# Patient Record
Sex: FEMALE | Race: BLACK OR AFRICAN AMERICAN | NOT HISPANIC OR LATINO | URBAN - METROPOLITAN AREA
[De-identification: names, ages, dates, MRNs, and addresses within clinical notes are randomized per-mention and may not be internally consistent; named-entity substitution may affect disease eponyms.]

---

## 2017-02-27 ENCOUNTER — EMERGENCY (EMERGENCY)
Facility: HOSPITAL | Age: 72
LOS: 0 days | Discharge: HOME | End: 2017-02-27
Attending: EMERGENCY MEDICINE
Payer: COMMERCIAL

## 2017-02-27 VITALS
HEIGHT: 71 IN | DIASTOLIC BLOOD PRESSURE: 80 MMHG | TEMPERATURE: 98 F | OXYGEN SATURATION: 98 % | HEART RATE: 103 BPM | SYSTOLIC BLOOD PRESSURE: 141 MMHG | WEIGHT: 207.01 LBS

## 2017-02-27 VITALS
RESPIRATION RATE: 16 BRPM | SYSTOLIC BLOOD PRESSURE: 148 MMHG | DIASTOLIC BLOOD PRESSURE: 84 MMHG | OXYGEN SATURATION: 98 % | TEMPERATURE: 98 F | HEART RATE: 104 BPM

## 2017-02-27 DIAGNOSIS — M19.90 UNSPECIFIED OSTEOARTHRITIS, UNSPECIFIED SITE: ICD-10-CM

## 2017-02-27 DIAGNOSIS — I10 ESSENTIAL (PRIMARY) HYPERTENSION: ICD-10-CM

## 2017-02-27 DIAGNOSIS — L03.116 CELLULITIS OF LEFT LOWER LIMB: ICD-10-CM

## 2017-02-27 DIAGNOSIS — Z79.4 LONG TERM (CURRENT) USE OF INSULIN: ICD-10-CM

## 2017-02-27 DIAGNOSIS — E78.5 HYPERLIPIDEMIA, UNSPECIFIED: ICD-10-CM

## 2017-02-27 DIAGNOSIS — M54.9 DORSALGIA, UNSPECIFIED: ICD-10-CM

## 2017-02-27 DIAGNOSIS — E11.9 TYPE 2 DIABETES MELLITUS WITHOUT COMPLICATIONS: ICD-10-CM

## 2017-02-27 LAB
ALBUMIN SERPL ELPH-MCNC: 3.7 G/DL — SIGNIFICANT CHANGE UP (ref 3.3–5)
ALP SERPL-CCNC: 213 U/L — HIGH (ref 40–120)
ALT FLD-CCNC: 22 U/L — SIGNIFICANT CHANGE UP (ref 12–78)
ANION GAP SERPL CALC-SCNC: 7 MMOL/L — SIGNIFICANT CHANGE UP (ref 5–17)
AST SERPL-CCNC: 14 U/L — LOW (ref 15–37)
BASOPHILS # BLD AUTO: 0.1 K/UL — SIGNIFICANT CHANGE UP (ref 0–0.2)
BASOPHILS NFR BLD AUTO: 1.4 % — SIGNIFICANT CHANGE UP (ref 0–2)
BILIRUB SERPL-MCNC: 0.6 MG/DL — SIGNIFICANT CHANGE UP (ref 0.2–1.2)
BUN SERPL-MCNC: 7 MG/DL — SIGNIFICANT CHANGE UP (ref 7–23)
CALCIUM SERPL-MCNC: 9.5 MG/DL — SIGNIFICANT CHANGE UP (ref 8.5–10.1)
CHLORIDE SERPL-SCNC: 107 MMOL/L — SIGNIFICANT CHANGE UP (ref 96–108)
CO2 SERPL-SCNC: 27 MMOL/L — SIGNIFICANT CHANGE UP (ref 22–31)
CREAT SERPL-MCNC: 0.68 MG/DL — SIGNIFICANT CHANGE UP (ref 0.5–1.3)
CRP SERPL-MCNC: 1.65 MG/DL — HIGH (ref 0–0.4)
EOSINOPHIL # BLD AUTO: 0 K/UL — SIGNIFICANT CHANGE UP (ref 0–0.5)
EOSINOPHIL NFR BLD AUTO: 0.9 % — SIGNIFICANT CHANGE UP (ref 0–6)
ERYTHROCYTE [SEDIMENTATION RATE] IN BLOOD: 2 MM/HR — SIGNIFICANT CHANGE UP (ref 0–20)
GLUCOSE SERPL-MCNC: 100 MG/DL — HIGH (ref 70–99)
HCT VFR BLD CALC: 44.4 % — SIGNIFICANT CHANGE UP (ref 34.5–45)
HGB BLD-MCNC: 14.3 G/DL — SIGNIFICANT CHANGE UP (ref 11.5–15.5)
LYMPHOCYTES # BLD AUTO: 2.1 K/UL — SIGNIFICANT CHANGE UP (ref 1–3.3)
LYMPHOCYTES # BLD AUTO: 39.1 % — SIGNIFICANT CHANGE UP (ref 13–44)
MCHC RBC-ENTMCNC: 25.1 PG — LOW (ref 27–34)
MCHC RBC-ENTMCNC: 32.2 GM/DL — SIGNIFICANT CHANGE UP (ref 32–36)
MCV RBC AUTO: 77.9 FL — LOW (ref 80–100)
MONOCYTES # BLD AUTO: 0.5 K/UL — SIGNIFICANT CHANGE UP (ref 0–0.9)
MONOCYTES NFR BLD AUTO: 9.8 % — SIGNIFICANT CHANGE UP (ref 2–14)
NEUTROPHILS # BLD AUTO: 2.7 K/UL — SIGNIFICANT CHANGE UP (ref 1.8–7.4)
NEUTROPHILS NFR BLD AUTO: 48.9 % — SIGNIFICANT CHANGE UP (ref 43–77)
PLATELET # BLD AUTO: 195 K/UL — SIGNIFICANT CHANGE UP (ref 150–400)
POTASSIUM SERPL-MCNC: 3.4 MMOL/L — LOW (ref 3.5–5.3)
POTASSIUM SERPL-SCNC: 3.4 MMOL/L — LOW (ref 3.5–5.3)
PROT SERPL-MCNC: 7.8 GM/DL — SIGNIFICANT CHANGE UP (ref 6–8.3)
RBC # BLD: 5.69 M/UL — HIGH (ref 3.8–5.2)
RBC # FLD: 13.6 % — SIGNIFICANT CHANGE UP (ref 11–15)
SODIUM SERPL-SCNC: 141 MMOL/L — SIGNIFICANT CHANGE UP (ref 135–145)
WBC # BLD: 5.5 K/UL — SIGNIFICANT CHANGE UP (ref 3.8–10.5)
WBC # FLD AUTO: 5.5 K/UL — SIGNIFICANT CHANGE UP (ref 3.8–10.5)

## 2017-02-27 PROCEDURE — 73552 X-RAY EXAM OF FEMUR 2/>: CPT | Mod: 26,LT

## 2017-02-27 PROCEDURE — 99284 EMERGENCY DEPT VISIT MOD MDM: CPT

## 2017-02-27 RX ORDER — IBUPROFEN 200 MG
600 TABLET ORAL ONCE
Qty: 0 | Refills: 0 | Status: COMPLETED | OUTPATIENT
Start: 2017-02-27 | End: 2017-02-27

## 2017-02-27 RX ORDER — VANCOMYCIN HCL 1 G
1000 VIAL (EA) INTRAVENOUS ONCE
Qty: 0 | Refills: 0 | Status: COMPLETED | OUTPATIENT
Start: 2017-02-27 | End: 2017-02-27

## 2017-02-27 RX ORDER — AMPICILLIN SODIUM AND SULBACTAM SODIUM 250; 125 MG/ML; MG/ML
3 INJECTION, POWDER, FOR SUSPENSION INTRAMUSCULAR; INTRAVENOUS ONCE
Qty: 0 | Refills: 0 | Status: COMPLETED | OUTPATIENT
Start: 2017-02-27 | End: 2017-02-27

## 2017-02-27 RX ADMIN — Medication 250 MILLIGRAM(S): at 13:11

## 2017-02-27 RX ADMIN — AMPICILLIN SODIUM AND SULBACTAM SODIUM 200 GRAM(S): 250; 125 INJECTION, POWDER, FOR SUSPENSION INTRAMUSCULAR; INTRAVENOUS at 13:11

## 2017-02-27 RX ADMIN — Medication 600 MILLIGRAM(S): at 15:54

## 2017-02-27 RX ADMIN — Medication 600 MILLIGRAM(S): at 15:55

## 2017-02-27 NOTE — ED ADULT NURSE NOTE - CHIEF COMPLAINT QUOTE
pt complaining of abscess to left inner thigh since Friday. pt has history of htn and diabetes. pt from [facility] adult day care.

## 2017-02-27 NOTE — ED ADULT NURSE NOTE - ED STAT RN HANDOFF DETAILS
patient feels better  hr remains above 103 md [place] informed no further instructions   patient is from day care of [facility] to go back through there service

## 2017-02-27 NOTE — ED PROVIDER NOTE - PHYSICAL EXAMINATION
+ left medial thigh abscess + left medial thigh mid thigh to post knee and proximal medial leg ~ 12cm x 6 cm erythema/cellulitis

## 2017-02-27 NOTE — ED PROVIDER NOTE - MEDICAL DECISION MAKING DETAILS
Pt well appearing, labs wnl. DC with bactrim. aware if inc erythema, fever to return to er. Discussed results and outcome of testing with the patient.  Patient given copy of available results. Patient advised to please follow up with their PMD within the next 24 hours and return to the Emergency Department for worsening symptoms or any other concerns.

## 2017-02-27 NOTE — ED ADULT TRIAGE NOTE - CHIEF COMPLAINT QUOTE
pt complaining of abscess to left inner thigh since Friday. pt has history of htn and diabetes. pt from [facility] adult day care.

## 2017-02-27 NOTE — ED PROVIDER NOTE - OBJECTIVE STATEMENT
70yo female with pmh HTN, DM presents with 3 days of left medial thigh abscess. No fever.  Pt works at [employer].     No fever/chills. No photophobia/eye pain/changes in vision, No ear pain/sore throat/dysphagia, No chest pain/palpitations. No SOB/cough/stridor. No abdominal pain, N/V/D, no black/bloody bm. No dysuria/frequency/discharge, No headache. No Dizziness.  + rash.  No numbness/tingling/weakness. 72yo female with pmh HTN, DM presents with 3 days of left medial thigh erythema. No fever.  Pt works at [employer].     No fever/chills. No photophobia/eye pain/changes in vision, No ear pain/sore throat/dysphagia, No chest pain/palpitations. No SOB/cough/stridor. No abdominal pain, N/V/D, no black/bloody bm. No dysuria/frequency/discharge, No headache. No Dizziness.  + rash.  No numbness/tingling/weakness. 72yo female with pmh HTN, DM presents with 3 days of left medial thigh erythema. No fever.  Pt from [facility] adult day care.    No fever/chills. No photophobia/eye pain/changes in vision, No ear pain/sore throat/dysphagia, No chest pain/palpitations. No SOB/cough/stridor. No abdominal pain, N/V/D, no black/bloody bm. No dysuria/frequency/discharge, No headache. No Dizziness.  + rash.  No numbness/tingling/weakness.

## 2017-03-01 ENCOUNTER — INPATIENT (INPATIENT)
Facility: HOSPITAL | Age: 72
LOS: 4 days | Discharge: HOME | End: 2017-03-06
Attending: INTERNAL MEDICINE | Admitting: INTERNAL MEDICINE
Payer: COMMERCIAL

## 2017-03-01 VITALS
OXYGEN SATURATION: 100 % | WEIGHT: 207.01 LBS | TEMPERATURE: 98 F | SYSTOLIC BLOOD PRESSURE: 175 MMHG | HEIGHT: 71 IN | DIASTOLIC BLOOD PRESSURE: 73 MMHG | RESPIRATION RATE: 18 BRPM | HEART RATE: 84 BPM

## 2017-03-01 DIAGNOSIS — M19.90 UNSPECIFIED OSTEOARTHRITIS, UNSPECIFIED SITE: ICD-10-CM

## 2017-03-01 DIAGNOSIS — R52 PAIN, UNSPECIFIED: ICD-10-CM

## 2017-03-01 DIAGNOSIS — E78.5 HYPERLIPIDEMIA, UNSPECIFIED: ICD-10-CM

## 2017-03-01 DIAGNOSIS — E11.9 TYPE 2 DIABETES MELLITUS WITHOUT COMPLICATIONS: ICD-10-CM

## 2017-03-01 DIAGNOSIS — I10 ESSENTIAL (PRIMARY) HYPERTENSION: ICD-10-CM

## 2017-03-01 DIAGNOSIS — L03.116 CELLULITIS OF LEFT LOWER LIMB: ICD-10-CM

## 2017-03-01 LAB
ALBUMIN SERPL ELPH-MCNC: 3.8 G/DL — SIGNIFICANT CHANGE UP (ref 3.3–5)
ALP SERPL-CCNC: 207 U/L — HIGH (ref 40–120)
ALT FLD-CCNC: 24 U/L — SIGNIFICANT CHANGE UP (ref 12–78)
ANION GAP SERPL CALC-SCNC: 9 MMOL/L — SIGNIFICANT CHANGE UP (ref 5–17)
APPEARANCE UR: CLEAR — SIGNIFICANT CHANGE UP
APTT BLD: 33 SEC — SIGNIFICANT CHANGE UP (ref 27.5–37.4)
AST SERPL-CCNC: 16 U/L — SIGNIFICANT CHANGE UP (ref 15–37)
BACTERIA # UR AUTO: ABNORMAL
BASOPHILS # BLD AUTO: 0 K/UL — SIGNIFICANT CHANGE UP (ref 0–0.2)
BASOPHILS NFR BLD AUTO: 1.1 % — SIGNIFICANT CHANGE UP (ref 0–2)
BILIRUB SERPL-MCNC: 0.3 MG/DL — SIGNIFICANT CHANGE UP (ref 0.2–1.2)
BILIRUB UR-MCNC: NEGATIVE — SIGNIFICANT CHANGE UP
BUN SERPL-MCNC: 9 MG/DL — SIGNIFICANT CHANGE UP (ref 7–23)
CALCIUM SERPL-MCNC: 9.6 MG/DL — SIGNIFICANT CHANGE UP (ref 8.5–10.1)
CHLORIDE SERPL-SCNC: 106 MMOL/L — SIGNIFICANT CHANGE UP (ref 96–108)
CO2 SERPL-SCNC: 26 MMOL/L — SIGNIFICANT CHANGE UP (ref 22–31)
COLOR SPEC: YELLOW — SIGNIFICANT CHANGE UP
CREAT SERPL-MCNC: 0.8 MG/DL — SIGNIFICANT CHANGE UP (ref 0.5–1.3)
DIFF PNL FLD: ABNORMAL
EOSINOPHIL # BLD AUTO: 0.1 K/UL — SIGNIFICANT CHANGE UP (ref 0–0.5)
EOSINOPHIL NFR BLD AUTO: 1.3 % — SIGNIFICANT CHANGE UP (ref 0–6)
EPI CELLS # UR: SIGNIFICANT CHANGE UP
GLUCOSE SERPL-MCNC: 95 MG/DL — SIGNIFICANT CHANGE UP (ref 70–99)
GLUCOSE UR QL: NEGATIVE MG/DL — SIGNIFICANT CHANGE UP
HCT VFR BLD CALC: 44.3 % — SIGNIFICANT CHANGE UP (ref 34.5–45)
HGB BLD-MCNC: 14.3 G/DL — SIGNIFICANT CHANGE UP (ref 11.5–15.5)
INR BLD: 1.04 RATIO — SIGNIFICANT CHANGE UP (ref 0.88–1.16)
KETONES UR-MCNC: NEGATIVE — SIGNIFICANT CHANGE UP
LEUKOCYTE ESTERASE UR-ACNC: NEGATIVE — SIGNIFICANT CHANGE UP
LYMPHOCYTES # BLD AUTO: 2.2 K/UL — SIGNIFICANT CHANGE UP (ref 1–3.3)
LYMPHOCYTES # BLD AUTO: 50.9 % — HIGH (ref 13–44)
MCHC RBC-ENTMCNC: 25 PG — LOW (ref 27–34)
MCHC RBC-ENTMCNC: 32.4 GM/DL — SIGNIFICANT CHANGE UP (ref 32–36)
MCV RBC AUTO: 77.2 FL — LOW (ref 80–100)
MONOCYTES # BLD AUTO: 0.4 K/UL — SIGNIFICANT CHANGE UP (ref 0–0.9)
MONOCYTES NFR BLD AUTO: 9.9 % — SIGNIFICANT CHANGE UP (ref 2–14)
NEUTROPHILS # BLD AUTO: 1.6 K/UL — LOW (ref 1.8–7.4)
NEUTROPHILS NFR BLD AUTO: 36.8 % — LOW (ref 43–77)
NITRITE UR-MCNC: NEGATIVE — SIGNIFICANT CHANGE UP
PH UR: 6 — SIGNIFICANT CHANGE UP (ref 4.8–8)
PLATELET # BLD AUTO: 216 K/UL — SIGNIFICANT CHANGE UP (ref 150–400)
POTASSIUM SERPL-MCNC: 3.6 MMOL/L — SIGNIFICANT CHANGE UP (ref 3.5–5.3)
POTASSIUM SERPL-SCNC: 3.6 MMOL/L — SIGNIFICANT CHANGE UP (ref 3.5–5.3)
PROT SERPL-MCNC: 7.9 GM/DL — SIGNIFICANT CHANGE UP (ref 6–8.3)
PROT UR-MCNC: NEGATIVE MG/DL — SIGNIFICANT CHANGE UP
PROTHROM AB SERPL-ACNC: 11.6 SEC — SIGNIFICANT CHANGE UP (ref 10–13.1)
RBC # BLD: 5.74 M/UL — HIGH (ref 3.8–5.2)
RBC # FLD: 13.6 % — SIGNIFICANT CHANGE UP (ref 11–15)
RBC CASTS # UR COMP ASSIST: SIGNIFICANT CHANGE UP /HPF (ref 0–4)
SODIUM SERPL-SCNC: 141 MMOL/L — SIGNIFICANT CHANGE UP (ref 135–145)
SP GR SPEC: 1.03 — HIGH (ref 1.01–1.02)
UROBILINOGEN FLD QL: NEGATIVE MG/DL — SIGNIFICANT CHANGE UP
WBC # BLD: 4.2 K/UL — SIGNIFICANT CHANGE UP (ref 3.8–10.5)
WBC # FLD AUTO: 4.2 K/UL — SIGNIFICANT CHANGE UP (ref 3.8–10.5)
WBC UR QL: NEGATIVE — SIGNIFICANT CHANGE UP

## 2017-03-01 PROCEDURE — 99223 1ST HOSP IP/OBS HIGH 75: CPT

## 2017-03-01 PROCEDURE — 73552 X-RAY EXAM OF FEMUR 2/>: CPT | Mod: 26,LT

## 2017-03-01 PROCEDURE — 99285 EMERGENCY DEPT VISIT HI MDM: CPT

## 2017-03-01 RX ORDER — PIPERACILLIN AND TAZOBACTAM 4; .5 G/20ML; G/20ML
3.38 INJECTION, POWDER, LYOPHILIZED, FOR SOLUTION INTRAVENOUS EVERY 8 HOURS
Qty: 0 | Refills: 0 | Status: DISCONTINUED | OUTPATIENT
Start: 2017-03-01 | End: 2017-03-06

## 2017-03-01 RX ORDER — AMLODIPINE BESYLATE 2.5 MG/1
5 TABLET ORAL DAILY
Qty: 0 | Refills: 0 | Status: DISCONTINUED | OUTPATIENT
Start: 2017-03-01 | End: 2017-03-06

## 2017-03-01 RX ORDER — ATORVASTATIN CALCIUM 80 MG/1
20 TABLET, FILM COATED ORAL AT BEDTIME
Qty: 0 | Refills: 0 | Status: DISCONTINUED | OUTPATIENT
Start: 2017-03-01 | End: 2017-03-06

## 2017-03-01 RX ORDER — DEXTROSE 50 % IN WATER 50 %
25 SYRINGE (ML) INTRAVENOUS ONCE
Qty: 0 | Refills: 0 | Status: DISCONTINUED | OUTPATIENT
Start: 2017-03-01 | End: 2017-03-06

## 2017-03-01 RX ORDER — INSULIN LISPRO 100/ML
VIAL (ML) SUBCUTANEOUS
Qty: 0 | Refills: 0 | Status: DISCONTINUED | OUTPATIENT
Start: 2017-03-01 | End: 2017-03-06

## 2017-03-01 RX ORDER — DEXTROSE 50 % IN WATER 50 %
12.5 SYRINGE (ML) INTRAVENOUS ONCE
Qty: 0 | Refills: 0 | Status: DISCONTINUED | OUTPATIENT
Start: 2017-03-01 | End: 2017-03-06

## 2017-03-01 RX ORDER — HEPARIN SODIUM 5000 [USP'U]/ML
5000 INJECTION INTRAVENOUS; SUBCUTANEOUS EVERY 12 HOURS
Qty: 0 | Refills: 0 | Status: DISCONTINUED | OUTPATIENT
Start: 2017-03-01 | End: 2017-03-06

## 2017-03-01 RX ORDER — PIPERACILLIN AND TAZOBACTAM 4; .5 G/20ML; G/20ML
3.38 INJECTION, POWDER, LYOPHILIZED, FOR SOLUTION INTRAVENOUS ONCE
Qty: 0 | Refills: 0 | Status: COMPLETED | OUTPATIENT
Start: 2017-03-01 | End: 2017-03-01

## 2017-03-01 RX ORDER — INSULIN LISPRO 100/ML
VIAL (ML) SUBCUTANEOUS AT BEDTIME
Qty: 0 | Refills: 0 | Status: DISCONTINUED | OUTPATIENT
Start: 2017-03-01 | End: 2017-03-06

## 2017-03-01 RX ORDER — GLUCAGON INJECTION, SOLUTION 0.5 MG/.1ML
1 INJECTION, SOLUTION SUBCUTANEOUS ONCE
Qty: 0 | Refills: 0 | Status: DISCONTINUED | OUTPATIENT
Start: 2017-03-01 | End: 2017-03-06

## 2017-03-01 RX ORDER — DEXTROSE 50 % IN WATER 50 %
1 SYRINGE (ML) INTRAVENOUS ONCE
Qty: 0 | Refills: 0 | Status: DISCONTINUED | OUTPATIENT
Start: 2017-03-01 | End: 2017-03-06

## 2017-03-01 RX ORDER — VANCOMYCIN HCL 1 G
1000 VIAL (EA) INTRAVENOUS ONCE
Qty: 0 | Refills: 0 | Status: COMPLETED | OUTPATIENT
Start: 2017-03-01 | End: 2017-03-01

## 2017-03-01 RX ORDER — DOCUSATE SODIUM 100 MG
100 CAPSULE ORAL
Qty: 0 | Refills: 0 | Status: DISCONTINUED | OUTPATIENT
Start: 2017-03-01 | End: 2017-03-06

## 2017-03-01 RX ADMIN — ATORVASTATIN CALCIUM 20 MILLIGRAM(S): 80 TABLET, FILM COATED ORAL at 21:34

## 2017-03-01 RX ADMIN — Medication 250 MILLIGRAM(S): at 18:31

## 2017-03-01 RX ADMIN — PIPERACILLIN AND TAZOBACTAM 200 GRAM(S): 4; .5 INJECTION, POWDER, LYOPHILIZED, FOR SOLUTION INTRAVENOUS at 18:05

## 2017-03-01 RX ADMIN — PIPERACILLIN AND TAZOBACTAM 25 GRAM(S): 4; .5 INJECTION, POWDER, LYOPHILIZED, FOR SOLUTION INTRAVENOUS at 21:34

## 2017-03-01 NOTE — H&P ADULT. - ASSESSMENT
71 year old female, admitted for failed oral antibiotics treatment for left inner thigh cellulitis. Patient is being treated with IV antibiotic and referred for ID Consult.

## 2017-03-01 NOTE — H&P ADULT. - HISTORY OF PRESENT ILLNESS
71 year old female PMH of HTN, DM Type 2 presents to ED with worsening erythema and pain of left inner thigh, extending downward slightly pass knee. Patient is currently on PO Bactrim which started on Monday (2/27/17) when she visited our ED. Patient reported that condition first started last Thursday (2/23/17). Erythema is accompanied by pain 4/10 and occasional itching. No lower extremity swelling present. Denies fever, chills, calf pain. 71 year old female PMH of HTN, DM Type 2 presents to ED with worsening erythema and pain of left inner thigh, extending downward slightly pass knee. Patient is currently on PO Bactrim which started on Monday (2/27/17) when she visited our ED. Patient reported that condition first started last Thursday (2/23/17). Erythema is accompanied by pain 4/10 and occasional itching. No lower extremity swelling present. Moving all extremities. Pedal Pulse +2.  Denies fever, chills, or calf pain. 71 year old female PMH of HTN, DM Type 2 presents to ED with worsening erythema and pain of left inner thigh, extending downward slightly pass knee. Site warm to touch.  Patient is currently on PO Bactrim which started on Monday (2/27/17) when she visited our ED. Patient reported that condition first started last Thursday (2/23/17). Erythema is accompanied by pain 4/10 and occasional itching. No lower extremity swelling present. Moving all extremities. Pedal Pulse +2.  Denies fever, chills, or calf pain. 71 year old female PMH of HTN, DM Type 2,C.Diff (5 yrs ago) presents to ED with worsening erythema and pain of left inner thigh, extending downward slightly pass the knee.  Patient is currently on PO Bactrim which started on Monday (2/27/17) when she visited our ED. Patient reported that  erythema first started last Thursday (2/23/17) and has worsened since. Erythema is accompanied by pain 4/10 and occasional itching. No lower extremity swelling present. Moving all extremities. Pedal Pulse +2.  Denies fever, chills, or calf pain.,CP,SOB,GI/ symps

## 2017-03-01 NOTE — PATIENT PROFILE ADULT. - VISION (WITH CORRECTIVE LENSES IF THE PATIENT USUALLY WEARS THEM):
wear glasses/Normal vision: sees adequately in most situations; can see medication labels, newsprint

## 2017-03-01 NOTE — ED PROVIDER NOTE - OBJECTIVE STATEMENT
71F w htn, dm comes in w worsening L medial thigh erythema despite outpt abx. pt is from [facility] adult day care. no fever/chills. no LE swelling. was seen here 3-4 days ago and given outpt abx (bactrim)  ROS: No fever/chills, No photophobia/eye pain/changes in vision, No ear pain/sore throat/dysphagia, No chest pain/palpitations, no SOB/cough/wheeze/stridor, No abdominal pain/N/V/D, no dysuria/frequency/discharge, No neck/back pain, no rash, no changes in neurological status/function.

## 2017-03-01 NOTE — H&P ADULT. - NEUROLOGICAL DETAILS
alert and oriented x 3/sensation intact/responds to verbal commands responds to pain/sensation intact/alert and oriented x 3/no spontaneous movement/responds to verbal commands/normal strength

## 2017-03-01 NOTE — H&P ADULT. - PROBLEM SELECTOR PLAN 1
Admit to Medical Surgical Unit  ID Consult due to failed oral ABT treatment  Zosyn Q8H, CBC/BMP in AM Admit to Medical Surgical Unit  -ID Consult due to failed oral ABX treatment  -started Zosyn IV Q8H   -f/u cultures  -pain control  -received 1dose clindamycin, d/cd 2/2 to h/o C.diff

## 2017-03-01 NOTE — H&P ADULT. - RS GEN PE MLT RESP DETAILS PC
clear to auscultation bilaterally/no chest wall tenderness/airway patent/normal respirations non-labored/no chest wall tenderness/normal/airway patent/clear to auscultation bilaterally/breath sounds equal/good air movement

## 2017-03-01 NOTE — ED PROVIDER NOTE - PHYSICAL EXAMINATION
GEN: Alert, NAD  HEAD: atraumatic, EOMI, PERRL, normal lids/conjunctiva  ENT: normal hearing, patent oropharynx without erythema/exudate, uvula midline  NECK: +supple, no tenderness/meningismus, +Trachea midline  PULM: Bilateral BS, normal resp effort, no wheeze/stridor/retractions  CV: RRR, no M/R/G, +dist ext pulses  ABD: soft, NT/ND  BACK: no CVAT, no midline tenderness  MSK: no edema/erythema/cyanosis  SKIN: no rash, L medial thigh erythema passed demarcated line drawn from a few days ago. no crepitus felt, no groin involvement  NEURO: AAOx3, no sensory/motor deficits, CN 2-12 intact

## 2017-03-01 NOTE — ED ADULT NURSE NOTE - CHIEF COMPLAINT QUOTE
pt came in from [facility]  for evaluation of left thigh swelling and pain, was tx for cellulitis on feb 27th however thigh has gotten increasingly worse, denies trauma to the leg, was taking po abx with no resolution

## 2017-03-01 NOTE — ED ADULT TRIAGE NOTE - CHIEF COMPLAINT QUOTE
pt came in from [facility]  for evaluation of left thigh swelling and pain, denies trauma to the leg pt came in from [facility]  for evaluation of left thigh swelling and pain, was tx for cellulitis on feb 27th however thigh has gotten increasingly worse, denies trauma to the leg, was taking po abx with no resolution

## 2017-03-01 NOTE — H&P ADULT. - PMH
Arthritis    Back Pain    Diabetes    HLD (hyperlipidemia) Arthritis    Back Pain    Diabetes    HLD (hyperlipidemia)    Hypertension

## 2017-03-01 NOTE — H&P ADULT. - PROBLEM SELECTOR PLAN 4
Physical Therapy Eval and Treat.  Monitor for pain and treat as indicated Monitor BP, continue Norvasc

## 2017-03-02 DIAGNOSIS — E78.5 HYPERLIPIDEMIA, UNSPECIFIED: ICD-10-CM

## 2017-03-02 LAB
ANION GAP SERPL CALC-SCNC: 8 MMOL/L — SIGNIFICANT CHANGE UP (ref 5–17)
BUN SERPL-MCNC: 7 MG/DL — SIGNIFICANT CHANGE UP (ref 7–23)
CALCIUM SERPL-MCNC: 9.3 MG/DL — SIGNIFICANT CHANGE UP (ref 8.5–10.1)
CHLORIDE SERPL-SCNC: 109 MMOL/L — HIGH (ref 96–108)
CO2 SERPL-SCNC: 26 MMOL/L — SIGNIFICANT CHANGE UP (ref 22–31)
CREAT SERPL-MCNC: 0.82 MG/DL — SIGNIFICANT CHANGE UP (ref 0.5–1.3)
CULTURE RESULTS: NO GROWTH — SIGNIFICANT CHANGE UP
GLUCOSE SERPL-MCNC: 110 MG/DL — HIGH (ref 70–99)
HBA1C BLD-MCNC: 7.1 % — HIGH (ref 4–5.6)
HCT VFR BLD CALC: 43.3 % — SIGNIFICANT CHANGE UP (ref 34.5–45)
HGB BLD-MCNC: 14.1 G/DL — SIGNIFICANT CHANGE UP (ref 11.5–15.5)
MCHC RBC-ENTMCNC: 25 PG — LOW (ref 27–34)
MCHC RBC-ENTMCNC: 32.6 GM/DL — SIGNIFICANT CHANGE UP (ref 32–36)
MCV RBC AUTO: 76.8 FL — LOW (ref 80–100)
PLATELET # BLD AUTO: 204 K/UL — SIGNIFICANT CHANGE UP (ref 150–400)
POTASSIUM SERPL-MCNC: 3.9 MMOL/L — SIGNIFICANT CHANGE UP (ref 3.5–5.3)
POTASSIUM SERPL-SCNC: 3.9 MMOL/L — SIGNIFICANT CHANGE UP (ref 3.5–5.3)
RBC # BLD: 5.64 M/UL — HIGH (ref 3.8–5.2)
RBC # FLD: 13 % — SIGNIFICANT CHANGE UP (ref 11–15)
SODIUM SERPL-SCNC: 143 MMOL/L — SIGNIFICANT CHANGE UP (ref 135–145)
SPECIMEN SOURCE: SIGNIFICANT CHANGE UP
WBC # BLD: 3.5 K/UL — LOW (ref 3.8–10.5)
WBC # FLD AUTO: 3.5 K/UL — LOW (ref 3.8–10.5)

## 2017-03-02 PROCEDURE — 99233 SBSQ HOSP IP/OBS HIGH 50: CPT

## 2017-03-02 PROCEDURE — 99223 1ST HOSP IP/OBS HIGH 75: CPT

## 2017-03-02 RX ORDER — ACETAMINOPHEN 500 MG
650 TABLET ORAL EVERY 6 HOURS
Qty: 0 | Refills: 0 | Status: DISCONTINUED | OUTPATIENT
Start: 2017-03-02 | End: 2017-03-06

## 2017-03-02 RX ADMIN — PIPERACILLIN AND TAZOBACTAM 25 GRAM(S): 4; .5 INJECTION, POWDER, LYOPHILIZED, FOR SOLUTION INTRAVENOUS at 05:23

## 2017-03-02 RX ADMIN — ATORVASTATIN CALCIUM 20 MILLIGRAM(S): 80 TABLET, FILM COATED ORAL at 22:44

## 2017-03-02 RX ADMIN — Medication 100 MILLIGRAM(S): at 17:17

## 2017-03-02 RX ADMIN — PIPERACILLIN AND TAZOBACTAM 25 GRAM(S): 4; .5 INJECTION, POWDER, LYOPHILIZED, FOR SOLUTION INTRAVENOUS at 22:47

## 2017-03-02 RX ADMIN — AMLODIPINE BESYLATE 5 MILLIGRAM(S): 2.5 TABLET ORAL at 05:23

## 2017-03-02 RX ADMIN — HEPARIN SODIUM 5000 UNIT(S): 5000 INJECTION INTRAVENOUS; SUBCUTANEOUS at 05:23

## 2017-03-02 RX ADMIN — PIPERACILLIN AND TAZOBACTAM 25 GRAM(S): 4; .5 INJECTION, POWDER, LYOPHILIZED, FOR SOLUTION INTRAVENOUS at 14:34

## 2017-03-02 RX ADMIN — Medication 100 MILLIGRAM(S): at 05:23

## 2017-03-02 RX ADMIN — HEPARIN SODIUM 5000 UNIT(S): 5000 INJECTION INTRAVENOUS; SUBCUTANEOUS at 17:18

## 2017-03-02 NOTE — PHYSICAL THERAPY INITIAL EVALUATION ADULT - MODIFIED CLINICAL TEST OF SENSORY INTEGRATION IN BALANCE TEST
Barthel Index: Feeding Score _10__, Bathing Score __5_, Grooming Score _5__, Dressing Score _10__, Bowels Score _10__, Bladder Score 0___, Toilet Score _10__, Transfers Score 10__, Mobility Score _10__, Stairs Score _0__,     Total Score _70__

## 2017-03-02 NOTE — PROGRESS NOTE ADULT - SUBJECTIVE AND OBJECTIVE BOX
HPI:  71 year old female PMH of HTN, DM Type 2,C.Diff (5 yrs ago) presents to ED with worsening erythema and pain of left inner thigh, extending downward slightly pass the knee.  Patient is currently on PO Bactrim which started on Monday (17) when she visited our ED. Patient reported that  erythema first started last Thursday (17) and has worsened since. Erythema is accompanied by pain 4/10 and occasional itching. No lower extremity swelling present. Moving all extremities. Pedal Pulse +2.  Denies fever, chills, or calf pain.,CP,SOB,GI/ symps (01 Mar 2017 20:04)    Ovdernight events: arrived on floor     PAST MEDICAL & SURGICAL HISTORY:  Hypertension  HLD (hyperlipidemia)  Diabetes  Arthritis  Back Pain  No significant past surgical history      REVIEW OF SYSTEMS:    CONSTITUTIONAL: No fever, weight loss, or fatigue  EYES: No eye pain, visual disturbances, or discharge  ENMT:  No difficulty hearing, tinnitus, vertigo; No sinus or throat pain  NECK: No pain or stiffness  BREASTS: No pain, masses, or nipple discharge  RESPIRATORY: No cough, wheezing, chills or hemoptysis; No shortness of breath  CARDIOVASCULAR: No chest pain, palpitations, dizziness, or leg swelling  GASTROINTESTINAL: No abdominal or epigastric pain. No nausea, vomiting, or hematemesis; No diarrhea or constipation. No melena or hematochezia.  GENITOURINARY: No dysuria, frequency, hematuria, or incontinence  NEUROLOGICAL: No headaches, memory loss, loss of strength, numbness, or tremors  SKIN: No itching, burning, rashes, or lesions   LYMPH NODES: No enlarged glands  ENDOCRINE: No heat or cold intolerance; No hair loss  MUSCULOSKELETAL: Tenderness left inner thigh   PSYCHIATRIC: No depression, anxiety, mood swings, or difficulty sleeping  HEME/LYMPH: No easy bruising, or bleeding gums  ALLERGY AND IMMUNOLOGIC: No hives or eczema      MEDICATIONS  (STANDING):  piperacillin/tazobactam IVPB. 3.375Gram(s) IV Intermittent every 8 hours  insulin lispro (HumaLOG) corrective regimen sliding scale  SubCutaneous three times a day before meals  insulin lispro (HumaLOG) corrective regimen sliding scale  SubCutaneous at bedtime  dextrose 50% Injectable 12.5Gram(s) IV Push once  dextrose 50% Injectable 25Gram(s) IV Push once  dextrose 50% Injectable 25Gram(s) IV Push once  heparin  Injectable 5000Unit(s) SubCutaneous every 12 hours  atorvastatin 20milliGRAM(s) Oral at bedtime  amLODIPine   Tablet 5milliGRAM(s) Oral daily  docusate sodium 100milliGRAM(s) Oral two times a day    MEDICATIONS  (PRN):  dextrose Gel 1Dose(s) Oral once PRN Blood Glucose LESS THAN 70 milliGRAM(s)/deciliter  glucagon  Injectable 1milliGRAM(s) IntraMuscular once PRN Glucose LESS THAN 70 milligrams/deciliter  acetaminophen   Tablet 650milliGRAM(s) Oral every 6 hours PRN For Temp greater than 38 C (100.4 F)  acetaminophen   Tablet. 650milliGRAM(s) Oral every 6 hours PRN Moderate Pain (4 - 6)      Allergies    No Known Allergies    Intolerances        SOCIAL HISTORY:    FAMILY HISTORY:  No pertinent family history in first degree relatives      Vital Signs Last 24 Hrs  T(C): 36.7, Max: 36.9 ( @ 20:01)  T(F): 98, Max: 98.4 ( @ 20:01)  HR: 78 (78 - 88)  BP: 118/80 (116/72 - 175/73)  BP(mean): --  RR: 16 (16 - 18)  SpO2: 98% (97% - 100%)    PHYSICAL EXAM:    GENERAL: NAD, well-groomed, well-developed  HEAD:  Atraumatic, Normocephalic  EYES: EOMI, PERRLA, conjunctiva and sclera clear  ENMT: No tonsillar erythema, exudates, or enlargement; Moist mucous membranes, Good dentition, No lesions  NECK: Supple, No JVD, Normal thyroid  NERVOUS SYSTEM:  Alert & Oriented X3, Good concentration; Motor Strength 5/5 B/L upper and lower extremities; DTRs 2+ intact and symmetric  CHEST/LUNG: Clear to percussion bilaterally; No rales, rhonchi, wheezing, or rubs  HEART: Regular rate and rhythm; No murmurs, rubs, or gallops  ABDOMEN: Soft, Nontender, Nondistended; Bowel sounds present  EXTREMITIES:  left inner thigh warm to touch palpable induration ?   LYMPH: No lymphadenopathy notedRECTAL: No masses, prostate normal size and smooth, Guiac negative   lpable masses noted   GYN: uterus normal size, adenexa no palpable masses, no CMT, no uterine discharge   SKIN: No rashes or lesions      LABS:                        14.1   3.5   )-----------( 204      ( 02 Mar 2017 07:46 )             43.3     02 Mar 2017 07:46    143    |  109    |  7      ----------------------------<  110    3.9     |  26     |  0.82     Ca    9.3        02 Mar 2017 07:46    TPro  7.9    /  Alb  3.8    /  TBili  0.3    /  DBili  x      /  AST  16     /  ALT  24     /  AlkPhos  207    01 Mar 2017 16:45    PT/INR - ( 01 Mar 2017 16:45 )   PT: 11.6 sec;   INR: 1.04 ratio         PTT - ( 01 Mar 2017 16:45 )  PTT:33.0 sec  Urinalysis Basic - ( 01 Mar 2017 18:05 )    Color: Yellow / Appearance: Clear / S.030 / pH: x  Gluc: x / Ketone: Negative  / Bili: Negative / Urobili: Negative mg/dL   Blood: x / Protein: Negative mg/dL / Nitrite: Negative   Leuk Esterase: Negative / RBC: 0-2 /HPF / WBC Negative   Sq Epi: x / Non Sq Epi: Occasional / Bacteria: Occasional        RADIOLOGY & ADDITIONAL STUDIES:

## 2017-03-02 NOTE — CONSULT NOTE ADULT - SUBJECTIVE AND OBJECTIVE BOX
Infectious Diseases - Attending at Dr. Onofre    HPI:  71 year old female PMH of HTN, DM Type 2,C.Diff (5 yrs ago) presents to ED with worsening erythema and pain of left inner thigh, extending downward slightly pass the knee.  Patient is currently on PO Bactrim which started on Monday (17) when she visited our ED. Patient reported that  erythema first started last Thursday (17) and has worsened since. Erythema is accompanied by pain 4/10 and occasional itching. No lower extremity swelling present. Moving all extremities. Pedal Pulse +2.  Denies fever, chills, or calf pain.,CP,SOB,GI/ symps (01 Mar 2017 20:04)      PAST MEDICAL & SURGICAL HISTORY:  Hypertension  HLD (hyperlipidemia)  Diabetes  Arthritis  Back Pain  No significant past surgical history      Allergies    No Known Allergies    Intolerances        FAMILY HISTORY:  No pertinent family history in first degree relatives      SOCIAL HISTORY:no smoking    REVIEW OF SYSTEMS:    Constitutional: No fever, weight loss or fatigue  Eyes: No eye pain, visual disturbances, or discharge  ENT:  No difficulty hearing, tinnitus, vertigo; No sinus or throat pain  Neck: No pain or stiffness  Respiratory: No cough, wheezing, chills or hemoptysis  Cardiovascular: No chest pain, palpitations, shortness of breath, dizziness or leg swelling  Gastrointestinal: No abdominal or epigastric pain. No nausea, vomiting or hematemesis; No diarrhea or constipation. No melena or hematochezia.  Genitourinary: No dysuria, frequency, hematuria or incontinence  Rectal: No pain, hemorrhoids or incontinence  Neurological: No headaches, memory loss, loss of strength, numbness or tremors  Skin: LLE cellulitis+,pain +      MEDICATIONS  (STANDING):  piperacillin/tazobactam IVPB. 3.375Gram(s) IV Intermittent every 8 hours  insulin lispro (HumaLOG) corrective regimen sliding scale  SubCutaneous three times a day before meals  insulin lispro (HumaLOG) corrective regimen sliding scale  SubCutaneous at bedtime  dextrose 50% Injectable 12.5Gram(s) IV Push once  dextrose 50% Injectable 25Gram(s) IV Push once  dextrose 50% Injectable 25Gram(s) IV Push once  heparin  Injectable 5000Unit(s) SubCutaneous every 12 hours  atorvastatin 20milliGRAM(s) Oral at bedtime  amLODIPine   Tablet 5milliGRAM(s) Oral daily  docusate sodium 100milliGRAM(s) Oral two times a day    MEDICATIONS  (PRN):  dextrose Gel 1Dose(s) Oral once PRN Blood Glucose LESS THAN 70 milliGRAM(s)/deciliter  glucagon  Injectable 1milliGRAM(s) IntraMuscular once PRN Glucose LESS THAN 70 milligrams/deciliter  acetaminophen   Tablet 650milliGRAM(s) Oral every 6 hours PRN For Temp greater than 38 C (100.4 F)  acetaminophen   Tablet. 650milliGRAM(s) Oral every 6 hours PRN Moderate Pain (4 - 6)      Vital Signs Last 24 Hrs  T(C): 36.6, Max: 36.8 ( @ 23:26)  T(F): 97.8, Max: 98.2 ( @ 23:26)  HR: 87 (78 - 87)  BP: 120/67 (116/72 - 120/67)  BP(mean): --  RR: 16 (16 - 18)  SpO2: 97% (97% - 98%)    PHYSICAL EXAM:    Constitutional: NAD, well-groomed, well-developed  HEENT: PERRLA, EOMI, Normal Hearing, MMM  Neck: No LAD, No JVD  Back: Normal spine flexure, No CVA tenderness  Respiratory: CTAB/L  Cardiovascular: S1 and S2, RRR, no M/G/R  Gastrointestinal: BS+, soft, NT/ND  Extremities: No peripheral edema  Vascular: 2+ peripheral pulses  Neurological: A/O x 3, no focal deficits  Skin:Left thigh and upper leg medial near knee has erythema,induration edema and tenderness+,better than yesterday      LABS:                        14.1   3.5   )-----------( 204      ( 02 Mar 2017 07:46 )             43.3     02 Mar 2017 07:46    143    |  109    |  7      ----------------------------<  110    3.9     |  26     |  0.82     Ca    9.3        02 Mar 2017 07:46    TPro  7.9    /  Alb  3.8    /  TBili  0.3    /  DBili  x      /  AST  16     /  ALT  24     /  AlkPhos  207    01 Mar 2017 16:45    PT/INR - ( 01 Mar 2017 16:45 )   PT: 11.6 sec;   INR: 1.04 ratio         PTT - ( 01 Mar 2017 16:45 )  PTT:33.0 sec  Urinalysis Basic - ( 01 Mar 2017 18:05 )    Color: Yellow / Appearance: Clear / S.030 / pH: x  Gluc: x / Ketone: Negative  / Bili: Negative / Urobili: Negative mg/dL   Blood: x / Protein: Negative mg/dL / Nitrite: Negative   Leuk Esterase: Negative / RBC: 0-2 /HPF / WBC Negative   Sq Epi: x / Non Sq Epi: Occasional / Bacteria: Occasional        MICROBIOLOGY:  RECENT CULTURES:   .Urine Clean Catch (Midstream) XXXX XXXX   No growth          RADIOLOGY & ADDITIONAL STUDIES:

## 2017-03-02 NOTE — CONSULT NOTE ADULT - PROBLEM SELECTOR RECOMMENDATION 9
cont vanco and zosyn  f/u on BC  plan switching to oral soon if it continues to improve  Vanco level

## 2017-03-02 NOTE — PROGRESS NOTE ADULT - ASSESSMENT
71 year old female, admitted for failed oral antibiotics treatment for left inner thigh cellulitis. Patient is being treated with IV antibiotic and referred for ID Consult.    Problem/Plan - 1:  ·  Problem: Cellulitis of left lower extremity.  Plan: Admit to Medical Surgical Unit  -ID Consult due to failed oral ABX treatment  -started Zosyn IV Q8H   -f/u cultures  -pain control  -received 1dose clindamycin, d/cd 2/2 to h/o C.diffAdmit to Medical Surgical Unit  ID Consult due to failed oral ABT treatment  Zosyn Q8H, CBC/BMP in AMreceived vaanco x 1 .   xray reviewed will also order Doppler to evaluate for DVT     Problem/Plan - 2:  ·  Problem: Type 2 diabetes mellitus without complication, without long-term current use of insulinPain.  Plan: -monitor FS  - c/w S/SMonitor Pain, treat with Tylenol PRN.     Problem/Plan - 3:  ·  Problem: HLD (hyperlipidemia)Diabetes.  Plan: Continue Home Meds-LipitorFS AC/HS, HbA1C  Insulin sliding scale  Consistent Carb Diet.     Problem/Plan - 4:  ·  Problem: HypertensionArthritis.  Plan: Monitor BP, continue Norvasc    Discharge planning appreciate PT plan for rehab

## 2017-03-03 DIAGNOSIS — D72.819 DECREASED WHITE BLOOD CELL COUNT, UNSPECIFIED: ICD-10-CM

## 2017-03-03 DIAGNOSIS — K59.00 CONSTIPATION, UNSPECIFIED: ICD-10-CM

## 2017-03-03 LAB
ALBUMIN SERPL ELPH-MCNC: 3.5 G/DL — SIGNIFICANT CHANGE UP (ref 3.3–5)
ALP SERPL-CCNC: 197 U/L — HIGH (ref 40–120)
ALT FLD-CCNC: 18 U/L — SIGNIFICANT CHANGE UP (ref 12–78)
ANION GAP SERPL CALC-SCNC: 5 MMOL/L — SIGNIFICANT CHANGE UP (ref 5–17)
AST SERPL-CCNC: 13 U/L — LOW (ref 15–37)
BILIRUB SERPL-MCNC: 0.6 MG/DL — SIGNIFICANT CHANGE UP (ref 0.2–1.2)
BUN SERPL-MCNC: 11 MG/DL — SIGNIFICANT CHANGE UP (ref 7–23)
CALCIUM SERPL-MCNC: 9.5 MG/DL — SIGNIFICANT CHANGE UP (ref 8.5–10.1)
CHLORIDE SERPL-SCNC: 107 MMOL/L — SIGNIFICANT CHANGE UP (ref 96–108)
CO2 SERPL-SCNC: 30 MMOL/L — SIGNIFICANT CHANGE UP (ref 22–31)
CREAT SERPL-MCNC: 0.77 MG/DL — SIGNIFICANT CHANGE UP (ref 0.5–1.3)
GLUCOSE SERPL-MCNC: 105 MG/DL — HIGH (ref 70–99)
HCT VFR BLD CALC: 44.6 % — SIGNIFICANT CHANGE UP (ref 34.5–45)
HGB BLD-MCNC: 14.4 G/DL — SIGNIFICANT CHANGE UP (ref 11.5–15.5)
MCHC RBC-ENTMCNC: 25 PG — LOW (ref 27–34)
MCHC RBC-ENTMCNC: 32.2 GM/DL — SIGNIFICANT CHANGE UP (ref 32–36)
MCV RBC AUTO: 77.7 FL — LOW (ref 80–100)
PLATELET # BLD AUTO: 219 K/UL — SIGNIFICANT CHANGE UP (ref 150–400)
POTASSIUM SERPL-MCNC: 3.4 MMOL/L — LOW (ref 3.5–5.3)
POTASSIUM SERPL-SCNC: 3.4 MMOL/L — LOW (ref 3.5–5.3)
PROT SERPL-MCNC: 7.5 GM/DL — SIGNIFICANT CHANGE UP (ref 6–8.3)
RBC # BLD: 5.74 M/UL — HIGH (ref 3.8–5.2)
RBC # FLD: 13.8 % — SIGNIFICANT CHANGE UP (ref 11–15)
SODIUM SERPL-SCNC: 142 MMOL/L — SIGNIFICANT CHANGE UP (ref 135–145)
WBC # BLD: 3 K/UL — LOW (ref 3.8–10.5)
WBC # FLD AUTO: 3 K/UL — LOW (ref 3.8–10.5)

## 2017-03-03 PROCEDURE — 99233 SBSQ HOSP IP/OBS HIGH 50: CPT

## 2017-03-03 PROCEDURE — 93971 EXTREMITY STUDY: CPT | Mod: 26

## 2017-03-03 RX ORDER — LACTULOSE 10 G/15ML
20 SOLUTION ORAL
Qty: 0 | Refills: 0 | Status: DISCONTINUED | OUTPATIENT
Start: 2017-03-03 | End: 2017-03-06

## 2017-03-03 RX ORDER — VANCOMYCIN HCL 1 G
1000 VIAL (EA) INTRAVENOUS EVERY 12 HOURS
Qty: 0 | Refills: 0 | Status: DISCONTINUED | OUTPATIENT
Start: 2017-03-03 | End: 2017-03-06

## 2017-03-03 RX ORDER — POTASSIUM CHLORIDE 20 MEQ
40 PACKET (EA) ORAL EVERY 4 HOURS
Qty: 0 | Refills: 0 | Status: COMPLETED | OUTPATIENT
Start: 2017-03-03 | End: 2017-03-03

## 2017-03-03 RX ADMIN — Medication 100 MILLIGRAM(S): at 05:22

## 2017-03-03 RX ADMIN — Medication 250 MILLIGRAM(S): at 19:24

## 2017-03-03 RX ADMIN — HEPARIN SODIUM 5000 UNIT(S): 5000 INJECTION INTRAVENOUS; SUBCUTANEOUS at 19:24

## 2017-03-03 RX ADMIN — PIPERACILLIN AND TAZOBACTAM 25 GRAM(S): 4; .5 INJECTION, POWDER, LYOPHILIZED, FOR SOLUTION INTRAVENOUS at 05:22

## 2017-03-03 RX ADMIN — Medication 40 MILLIEQUIVALENT(S): at 15:02

## 2017-03-03 RX ADMIN — Medication 40 MILLIEQUIVALENT(S): at 19:24

## 2017-03-03 RX ADMIN — AMLODIPINE BESYLATE 5 MILLIGRAM(S): 2.5 TABLET ORAL at 05:22

## 2017-03-03 RX ADMIN — Medication 100 MILLIGRAM(S): at 19:24

## 2017-03-03 RX ADMIN — HEPARIN SODIUM 5000 UNIT(S): 5000 INJECTION INTRAVENOUS; SUBCUTANEOUS at 05:22

## 2017-03-03 RX ADMIN — Medication 40 MILLIEQUIVALENT(S): at 12:56

## 2017-03-03 RX ADMIN — PIPERACILLIN AND TAZOBACTAM 25 GRAM(S): 4; .5 INJECTION, POWDER, LYOPHILIZED, FOR SOLUTION INTRAVENOUS at 13:22

## 2017-03-03 RX ADMIN — PIPERACILLIN AND TAZOBACTAM 25 GRAM(S): 4; .5 INJECTION, POWDER, LYOPHILIZED, FOR SOLUTION INTRAVENOUS at 22:53

## 2017-03-03 RX ADMIN — ATORVASTATIN CALCIUM 20 MILLIGRAM(S): 80 TABLET, FILM COATED ORAL at 22:53

## 2017-03-03 NOTE — PROGRESS NOTE ADULT - SUBJECTIVE AND OBJECTIVE BOX
Patient is a 71y old  Female who presents with a chief complaint of pain/swelling left inner [id number] (01 Mar 2017 20:53)      INTERVAL HPI / OVERNIGHT EVENTS: left leg swelling improving, no fever ,chills    MEDICATIONS  (STANDING):  piperacillin/tazobactam IVPB. 3.375Gram(s) IV Intermittent every 8 hours  insulin lispro (HumaLOG) corrective regimen sliding scale  SubCutaneous three times a day before meals  insulin lispro (HumaLOG) corrective regimen sliding scale  SubCutaneous at bedtime  dextrose 50% Injectable 12.5Gram(s) IV Push once  dextrose 50% Injectable 25Gram(s) IV Push once  dextrose 50% Injectable 25Gram(s) IV Push once  heparin  Injectable 5000Unit(s) SubCutaneous every 12 hours  atorvastatin 20milliGRAM(s) Oral at bedtime  amLODIPine   Tablet 5milliGRAM(s) Oral daily  docusate sodium 100milliGRAM(s) Oral two times a day  vancomycin  IVPB 1000milliGRAM(s) IV Intermittent every 12 hours    MEDICATIONS  (PRN):  dextrose Gel 1Dose(s) Oral once PRN Blood Glucose LESS THAN 70 milliGRAM(s)/deciliter  glucagon  Injectable 1milliGRAM(s) IntraMuscular once PRN Glucose LESS THAN 70 milligrams/deciliter  acetaminophen   Tablet 650milliGRAM(s) Oral every 6 hours PRN For Temp greater than 38 C (100.4 F)  acetaminophen   Tablet. 650milliGRAM(s) Oral every 6 hours PRN Moderate Pain (4 - 6)  lactulose Syrup 20Gram(s) Oral two times a day PRN constipation      Vital Signs Last 24 Hrs  T(C): 37, Max: 37.1 (03-03 @ 12:32)  T(F): 98.6, Max: 98.8 (03-03 @ 12:32)  HR: 80 (76 - 83)  BP: 110/76 (110/76 - 130/72)  BP(mean): --  RR: 16 (16 - 18)  SpO2: 96% (96% - 98%)    PHYSICAL EXAM:    Constitutional: NAD, well-groomed, well-developed  Respiratory: CTAB/L  Cardiovascular: S1 and S2, RRR, no M/G/R  Gastrointestinal: BS+, soft, NT/ND  Extremities: No peripheral edema  Vascular: 2+ peripheral pulses  Skin: left LE swelling,redness resolving(reduced to thigh only-yesterday involved medial leg as well)      LABS:                        14.4   3.0   )-----------( 219      ( 03 Mar 2017 08:37 )             44.6     03 Mar 2017 08:37    142    |  107    |  11     ----------------------------<  105    3.4     |  30     |  0.77     Ca    9.5        03 Mar 2017 08:37    TPro  7.5    /  Alb  3.5    /  TBili  0.6    /  DBili  x      /  AST  13     /  ALT  18     /  AlkPhos  197    03 Mar 2017 08:37            MICROBIOLOGY:  RECENT CULTURES:  03-02 .Urine Clean Catch (Midstream) XXXX XXXX   No growth    03-01 .Blood Blood-Venous XXXX XXXX   No growth to date.          RADIOLOGY & ADDITIONAL STUDIES:

## 2017-03-03 NOTE — PROGRESS NOTE ADULT - SUBJECTIVE AND OBJECTIVE BOX
HPI:  71 year old female PMH of HTN, DM Type 2,C.Diff (5 yrs ago) presents to ED with worsening erythema and pain of left inner thigh, extending downward slightly pass the knee.  Patient is currently on PO Bactrim which started on Monday (17) when she visited our ED. Patient reported that  erythema first started last Thursday (17) and has worsened since. Erythema is accompanied by pain 4/10 and occasional itching. No lower extremity swelling present. Moving all extremities. Pedal Pulse +2.  Denies fever, chills, or calf pain.,CP,SOB,GI/ symps (01 Mar 2017 20:04)       No acute events overnight complaining of constipation     PAST MEDICAL & SURGICAL HISTORY:  Hypertension  HLD (hyperlipidemia)  Diabetes  Arthritis  Back Pain  No significant past surgical history      REVIEW OF SYSTEMS:    CONSTITUTIONAL: No fever, weight loss, or fatigue  EYES: No eye pain, visual disturbances, or discharge  ENMT:  No difficulty hearing, tinnitus, vertigo; No sinus or throat pain  NECK: No pain or stiffness  BREASTS: No pain, masses, or nipple discharge  RESPIRATORY: No cough, wheezing, chills or hemoptysis; No shortness of breath  CARDIOVASCULAR: No chest pain, palpitations, dizziness, or leg swelling  GASTROINTESTINAL: constipation  GENITOURINARY: No dysuria, frequency, hematuria, or incontinence  NEUROLOGICAL: No headaches, memory loss, loss of strength, numbness, or tremors  SKIN: No itching, burning, rashes, or lesions   LYMPH NODES: No enlarged glands  ENDOCRINE: No heat or cold intolerance; No hair loss  MUSCULOSKELETAL: No joint pain or swelling; No muscle, back, or extremity pain  PSYCHIATRIC: No depression, anxiety, mood swings, or difficulty sleeping  HEME/LYMPH: No easy bruising, or bleeding gums  ALLERGY AND IMMUNOLOGIC: No hives or eczema      MEDICATIONS  (STANDING):  piperacillin/tazobactam IVPB. 3.375Gram(s) IV Intermittent every 8 hours  insulin lispro (HumaLOG) corrective regimen sliding scale  SubCutaneous three times a day before meals  insulin lispro (HumaLOG) corrective regimen sliding scale  SubCutaneous at bedtime  dextrose 50% Injectable 12.5Gram(s) IV Push once  dextrose 50% Injectable 25Gram(s) IV Push once  dextrose 50% Injectable 25Gram(s) IV Push once  heparin  Injectable 5000Unit(s) SubCutaneous every 12 hours  atorvastatin 20milliGRAM(s) Oral at bedtime  amLODIPine   Tablet 5milliGRAM(s) Oral daily  docusate sodium 100milliGRAM(s) Oral two times a day  potassium chloride    Tablet ER 40milliEquivalent(s) Oral every 4 hours  vancomycin  IVPB 1000milliGRAM(s) IV Intermittent every 12 hours    MEDICATIONS  (PRN):  dextrose Gel 1Dose(s) Oral once PRN Blood Glucose LESS THAN 70 milliGRAM(s)/deciliter  glucagon  Injectable 1milliGRAM(s) IntraMuscular once PRN Glucose LESS THAN 70 milligrams/deciliter  acetaminophen   Tablet 650milliGRAM(s) Oral every 6 hours PRN For Temp greater than 38 C (100.4 F)  acetaminophen   Tablet. 650milliGRAM(s) Oral every 6 hours PRN Moderate Pain (4 - 6)  lactulose Syrup 20Gram(s) Oral two times a day PRN constipation      Allergies    No Known Allergies    Intolerances        SOCIAL HISTORY:    FAMILY HISTORY:  No pertinent family history in first degree relatives      Vital Signs Last 24 Hrs  T(C): 37.1, Max: 37.1 (03-03 @ 12:32)  T(F): 98.8, Max: 98.8 (03-03 @ 12:32)  HR: 83 (76 - 87)  BP: 111/73 (111/73 - 130/72)  BP(mean): --  RR: 16 (16 - 18)  SpO2: 98% (96% - 98%)    PHYSICAL EXAM:    GENERAL: NAD, well-groomed, well-developed  HEAD:  Atraumatic, Normocephalic  EYES: EOMI, PERRLA, conjunctiva and sclera clear  ENMT: No tonsillar erythema, exudates, or enlargement; Moist mucous membranes, Good dentition, No lesions  NECK: Supple, No JVD, Normal thyroid  NERVOUS SYSTEM:  Alert & Oriented X3, Good concentration; Motor Strength 5/5 B/L upper and lower extremities; DTRs 2+ intact and symmetric  CHEST/LUNG: Clear to percussion bilaterally; No rales, rhonchi, wheezing, or rubs  HEART: Regular rate and rhythm; No murmurs, rubs, or gallops  ABDOMEN: Soft, Nontender, Nondistended; Bowel sounds present  EXTREMITIES:  2+ Peripheral Pulses, No clubbing, cyanosis, or edema  SKIN: No rashes or lesions      LABS:                        14.4   3.0   )-----------( 219      ( 03 Mar 2017 08:37 )             44.6     03 Mar 2017 08:37    142    |  107    |  11     ----------------------------<  105    3.4     |  30     |  0.77     Ca    9.5        03 Mar 2017 08:37    TPro  7.5    /  Alb  3.5    /  TBili  0.6    /  DBili  x      /  AST  13     /  ALT  18     /  AlkPhos  197    03 Mar 2017 08:37    PT/INR - ( 01 Mar 2017 16:45 )   PT: 11.6 sec;   INR: 1.04 ratio         PTT - ( 01 Mar 2017 16:45 )  PTT:33.0 sec  Urinalysis Basic - ( 01 Mar 2017 18:05 )    Color: Yellow / Appearance: Clear / S.030 / pH: x  Gluc: x / Ketone: Negative  / Bili: Negative / Urobili: Negative mg/dL   Blood: x / Protein: Negative mg/dL / Nitrite: Negative   Leuk Esterase: Negative / RBC: 0-2 /HPF / WBC Negative   Sq Epi: x / Non Sq Epi: Occasional / Bacteria: Occasional        RADIOLOGY & ADDITIONAL STUDIES:

## 2017-03-03 NOTE — PROGRESS NOTE ADULT - ASSESSMENT
71 year old female, admitted for failed oral antibiotics treatment for left inner thigh cellulitis. Patient is being treated with IV antibiotic and referred for ID Consult.  Dispo: home with out patient PT at adult day [facility]

## 2017-03-04 DIAGNOSIS — I10 ESSENTIAL (PRIMARY) HYPERTENSION: ICD-10-CM

## 2017-03-04 DIAGNOSIS — E11.65 TYPE 2 DIABETES MELLITUS WITH HYPERGLYCEMIA: ICD-10-CM

## 2017-03-04 DIAGNOSIS — Z78.9 OTHER SPECIFIED HEALTH STATUS: ICD-10-CM

## 2017-03-04 LAB
ALBUMIN SERPL ELPH-MCNC: 3.6 G/DL — SIGNIFICANT CHANGE UP (ref 3.3–5)
ALP SERPL-CCNC: 192 U/L — HIGH (ref 40–120)
ALT FLD-CCNC: 19 U/L — SIGNIFICANT CHANGE UP (ref 12–78)
ANION GAP SERPL CALC-SCNC: 5 MMOL/L — SIGNIFICANT CHANGE UP (ref 5–17)
AST SERPL-CCNC: 16 U/L — SIGNIFICANT CHANGE UP (ref 15–37)
BILIRUB SERPL-MCNC: 0.7 MG/DL — SIGNIFICANT CHANGE UP (ref 0.2–1.2)
BUN SERPL-MCNC: 11 MG/DL — SIGNIFICANT CHANGE UP (ref 7–23)
CALCIUM SERPL-MCNC: 9.7 MG/DL — SIGNIFICANT CHANGE UP (ref 8.5–10.1)
CHLORIDE SERPL-SCNC: 107 MMOL/L — SIGNIFICANT CHANGE UP (ref 96–108)
CO2 SERPL-SCNC: 29 MMOL/L — SIGNIFICANT CHANGE UP (ref 22–31)
CREAT SERPL-MCNC: 0.7 MG/DL — SIGNIFICANT CHANGE UP (ref 0.5–1.3)
GLUCOSE SERPL-MCNC: 115 MG/DL — HIGH (ref 70–99)
HCT VFR BLD CALC: 45.2 % — HIGH (ref 34.5–45)
HGB BLD-MCNC: 14.9 G/DL — SIGNIFICANT CHANGE UP (ref 11.5–15.5)
MCHC RBC-ENTMCNC: 25.6 PG — LOW (ref 27–34)
MCHC RBC-ENTMCNC: 33 GM/DL — SIGNIFICANT CHANGE UP (ref 32–36)
MCV RBC AUTO: 77.4 FL — LOW (ref 80–100)
PLATELET # BLD AUTO: 220 K/UL — SIGNIFICANT CHANGE UP (ref 150–400)
POTASSIUM SERPL-MCNC: 3.9 MMOL/L — SIGNIFICANT CHANGE UP (ref 3.5–5.3)
POTASSIUM SERPL-SCNC: 3.9 MMOL/L — SIGNIFICANT CHANGE UP (ref 3.5–5.3)
PROT SERPL-MCNC: 7.9 GM/DL — SIGNIFICANT CHANGE UP (ref 6–8.3)
RBC # BLD: 5.84 M/UL — HIGH (ref 3.8–5.2)
RBC # FLD: 13.1 % — SIGNIFICANT CHANGE UP (ref 11–15)
SODIUM SERPL-SCNC: 141 MMOL/L — SIGNIFICANT CHANGE UP (ref 135–145)
VANCOMYCIN TROUGH SERPL-MCNC: 5.4 UG/ML — LOW (ref 10–20)
WBC # BLD: 2.9 K/UL — LOW (ref 3.8–10.5)
WBC # FLD AUTO: 2.9 K/UL — LOW (ref 3.8–10.5)

## 2017-03-04 PROCEDURE — 99232 SBSQ HOSP IP/OBS MODERATE 35: CPT

## 2017-03-04 PROCEDURE — 99233 SBSQ HOSP IP/OBS HIGH 50: CPT

## 2017-03-04 RX ADMIN — AMLODIPINE BESYLATE 5 MILLIGRAM(S): 2.5 TABLET ORAL at 05:30

## 2017-03-04 RX ADMIN — ATORVASTATIN CALCIUM 20 MILLIGRAM(S): 80 TABLET, FILM COATED ORAL at 22:10

## 2017-03-04 RX ADMIN — Medication 100 MILLIGRAM(S): at 05:30

## 2017-03-04 RX ADMIN — PIPERACILLIN AND TAZOBACTAM 25 GRAM(S): 4; .5 INJECTION, POWDER, LYOPHILIZED, FOR SOLUTION INTRAVENOUS at 22:09

## 2017-03-04 RX ADMIN — HEPARIN SODIUM 5000 UNIT(S): 5000 INJECTION INTRAVENOUS; SUBCUTANEOUS at 05:30

## 2017-03-04 RX ADMIN — HEPARIN SODIUM 5000 UNIT(S): 5000 INJECTION INTRAVENOUS; SUBCUTANEOUS at 17:01

## 2017-03-04 RX ADMIN — PIPERACILLIN AND TAZOBACTAM 25 GRAM(S): 4; .5 INJECTION, POWDER, LYOPHILIZED, FOR SOLUTION INTRAVENOUS at 05:29

## 2017-03-04 RX ADMIN — Medication 250 MILLIGRAM(S): at 05:30

## 2017-03-04 RX ADMIN — Medication 250 MILLIGRAM(S): at 22:09

## 2017-03-04 RX ADMIN — Medication 100 MILLIGRAM(S): at 17:01

## 2017-03-04 RX ADMIN — PIPERACILLIN AND TAZOBACTAM 25 GRAM(S): 4; .5 INJECTION, POWDER, LYOPHILIZED, FOR SOLUTION INTRAVENOUS at 13:19

## 2017-03-04 NOTE — DIETITIAN INITIAL EVALUATION ADULT. - PERTINENT LABORATORY DATA
03-04 Na 141 mmol/L Glu 115 mg/dL<H> K+ 3.9 mmol/L Cr  0.70 mg/dL BUN 11 mg/dL Phos n/a   Alb 3.6 g/dL PAB n/a   Hgb 14.9 g/dL Hct 45.2 %<H>. CRP=1.65(3/2), WBC=2.9(3/4)

## 2017-03-04 NOTE — DIETITIAN INITIAL EVALUATION ADULT. - SOURCE
other (specify)/Pt [place] creole speaking speaks some English & prefers using Niece for translation & medical chart review/family/significant other/patient

## 2017-03-04 NOTE — PROGRESS NOTE ADULT - SUBJECTIVE AND OBJECTIVE BOX
Patient is a 71y old  Female who presents with a chief complaint of pain/swelling left inner [id number] (01 Mar 2017 20:53)      INTERVAL HPI / OVERNIGHT EVENTS:improving pain and swelling of right leg    MEDICATIONS  (STANDING):  piperacillin/tazobactam IVPB. 3.375Gram(s) IV Intermittent every 8 hours  insulin lispro (HumaLOG) corrective regimen sliding scale  SubCutaneous three times a day before meals  insulin lispro (HumaLOG) corrective regimen sliding scale  SubCutaneous at bedtime  dextrose 50% Injectable 12.5Gram(s) IV Push once  dextrose 50% Injectable 25Gram(s) IV Push once  dextrose 50% Injectable 25Gram(s) IV Push once  heparin  Injectable 5000Unit(s) SubCutaneous every 12 hours  atorvastatin 20milliGRAM(s) Oral at bedtime  amLODIPine   Tablet 5milliGRAM(s) Oral daily  docusate sodium 100milliGRAM(s) Oral two times a day  vancomycin  IVPB 1000milliGRAM(s) IV Intermittent every 12 hours    MEDICATIONS  (PRN):  dextrose Gel 1Dose(s) Oral once PRN Blood Glucose LESS THAN 70 milliGRAM(s)/deciliter  glucagon  Injectable 1milliGRAM(s) IntraMuscular once PRN Glucose LESS THAN 70 milligrams/deciliter  acetaminophen   Tablet 650milliGRAM(s) Oral every 6 hours PRN For Temp greater than 38 C (100.4 F)  acetaminophen   Tablet. 650milliGRAM(s) Oral every 6 hours PRN Moderate Pain (4 - 6)  lactulose Syrup 20Gram(s) Oral two times a day PRN constipation      Vital Signs Last 24 Hrs  T(C): 36.4, Max: 36.6 (03-03 @ 23:34)  T(F): 97.6, Max: 97.8 (03-03 @ 23:34)  HR: 70 (70 - 87)  BP: 131/68 (131/68 - 138/83)  BP(mean): --  RR: 16 (16 - 18)  SpO2: 98% (98% - 100%)    PHYSICAL EXAM:    Constitutional: NAD, well-groomed, well-developed  Respiratory: CTAB/L  Cardiovascular: S1 and S2, RRR, no M/G/R  Gastrointestinal: BS+, soft, NT/ND  Extremities: No peripheral edema  Vascular: 2+ peripheral pulses  Skin: improving, medial thigh edema      LABS:                        14.9   2.9   )-----------( 220      ( 04 Mar 2017 07:30 )             45.2     04 Mar 2017 07:30    141    |  107    |  11     ----------------------------<  115    3.9     |  29     |  0.70     Ca    9.7        04 Mar 2017 07:30    TPro  7.9    /  Alb  3.6    /  TBili  0.7    /  DBili  x      /  AST  16     /  ALT  19     /  AlkPhos  192    04 Mar 2017 07:30            MICROBIOLOGY:  RECENT CULTURES:  03-02 .Urine Clean Catch (Midstream) XXXX XXXX   No growth    03-01 .Blood Blood-Venous XXXX XXXX   No growth to date.          RADIOLOGY & ADDITIONAL STUDIES:

## 2017-03-04 NOTE — DIETITIAN INITIAL EVALUATION ADULT. - PROBLEM SELECTOR PLAN 1
Admit to Medical Surgical Unit  -ID Consult due to failed oral ABX treatment  -started Zosyn IV Q8H   -f/u cultures  -pain control  -received 1dose clindamycin, d/cd 2/2 to h/o C.diff

## 2017-03-04 NOTE — DIETITIAN INITIAL EVALUATION ADULT. - OTHER INFO
Pt seen for RN consult 3/3 for HgbA1C>7.0.  Pt lives alone with HHA.  Pt's niece does food shopping & HHA prepares food.  Pt attends Adult day care 3 x day where fingersticks are taken. Pt manages Metformin 500mg 2 x day; A1C=7.1%(3/2). Pt reports last BM x 2(3/4). Pt tolerating po diet well &  consuming 100% meals.

## 2017-03-04 NOTE — PROGRESS NOTE ADULT - PROBLEM SELECTOR PLAN 4
monitor ,can be sec to zosyn as well  check cbc in am
Monitor BP, continue Norvasc
monitor ,can be sec to zosyn as well  check cbc in am

## 2017-03-04 NOTE — PROGRESS NOTE ADULT - PROBLEM SELECTOR PLAN 1
Admit to Medical Surgical Unit  -ID Consult due to failed oral ABX treatment  -started Zosyn IV Q8H  -on vancomycin  -f/u cultures  -pain control  -received 1dose clindamycin, d/cd 2/2 to h/o C.diff  -US negative for DVT
improving  cont vanco and zosyn  switch to oral soon  vanco level pending  blood c/s neg
improving  cont vanco and zosyn  switch to oral soon  vanco level pending  blood c/s neg

## 2017-03-05 LAB
HCT VFR BLD CALC: 45.5 % — HIGH (ref 34.5–45)
HGB BLD-MCNC: 14.8 G/DL — SIGNIFICANT CHANGE UP (ref 11.5–15.5)
MCHC RBC-ENTMCNC: 25.1 PG — LOW (ref 27–34)
MCHC RBC-ENTMCNC: 32.5 GM/DL — SIGNIFICANT CHANGE UP (ref 32–36)
MCV RBC AUTO: 77.3 FL — LOW (ref 80–100)
PLATELET # BLD AUTO: 215 K/UL — SIGNIFICANT CHANGE UP (ref 150–400)
RBC # BLD: 5.89 M/UL — HIGH (ref 3.8–5.2)
RBC # FLD: 13.6 % — SIGNIFICANT CHANGE UP (ref 11–15)
WBC # BLD: 3.2 K/UL — LOW (ref 3.8–10.5)
WBC # FLD AUTO: 3.2 K/UL — LOW (ref 3.8–10.5)

## 2017-03-05 PROCEDURE — 99233 SBSQ HOSP IP/OBS HIGH 50: CPT

## 2017-03-05 RX ADMIN — HEPARIN SODIUM 5000 UNIT(S): 5000 INJECTION INTRAVENOUS; SUBCUTANEOUS at 17:53

## 2017-03-05 RX ADMIN — Medication 250 MILLIGRAM(S): at 17:53

## 2017-03-05 RX ADMIN — PIPERACILLIN AND TAZOBACTAM 25 GRAM(S): 4; .5 INJECTION, POWDER, LYOPHILIZED, FOR SOLUTION INTRAVENOUS at 21:37

## 2017-03-05 RX ADMIN — PIPERACILLIN AND TAZOBACTAM 25 GRAM(S): 4; .5 INJECTION, POWDER, LYOPHILIZED, FOR SOLUTION INTRAVENOUS at 14:00

## 2017-03-05 RX ADMIN — Medication 100 MILLIGRAM(S): at 05:57

## 2017-03-05 RX ADMIN — Medication 250 MILLIGRAM(S): at 05:57

## 2017-03-05 RX ADMIN — PIPERACILLIN AND TAZOBACTAM 25 GRAM(S): 4; .5 INJECTION, POWDER, LYOPHILIZED, FOR SOLUTION INTRAVENOUS at 05:57

## 2017-03-05 RX ADMIN — ATORVASTATIN CALCIUM 20 MILLIGRAM(S): 80 TABLET, FILM COATED ORAL at 21:37

## 2017-03-05 RX ADMIN — AMLODIPINE BESYLATE 5 MILLIGRAM(S): 2.5 TABLET ORAL at 05:57

## 2017-03-05 RX ADMIN — Medication 100 MILLIGRAM(S): at 17:54

## 2017-03-05 RX ADMIN — HEPARIN SODIUM 5000 UNIT(S): 5000 INJECTION INTRAVENOUS; SUBCUTANEOUS at 05:57

## 2017-03-05 NOTE — PROGRESS NOTE ADULT - PROBLEM SELECTOR PROBLEM 4
Leukopenia, unspecified type
Leukopenia, unspecified type
Hypertension
Hyperlipidemia, unspecified hyperlipidemia type

## 2017-03-05 NOTE — PROGRESS NOTE ADULT - SUBJECTIVE AND OBJECTIVE BOX
CC/F/U for: LLE cellulitis    INTERVAL HPI/OVERNIGHT EVENTS:  Pt seen and examined at bedside. Feels well; no complaints; leg pain impvd    Allergies/Intolerance: No Known Allergies      MEDICATIONS  (STANDING):  piperacillin/tazobactam IVPB. 3.375Gram(s) IV Intermittent every 8 hours  insulin lispro (HumaLOG) corrective regimen sliding scale  SubCutaneous three times a day before meals  insulin lispro (HumaLOG) corrective regimen sliding scale  SubCutaneous at bedtime  dextrose 50% Injectable 12.5Gram(s) IV Push once  dextrose 50% Injectable 25Gram(s) IV Push once  dextrose 50% Injectable 25Gram(s) IV Push once  heparin  Injectable 5000Unit(s) SubCutaneous every 12 hours  atorvastatin 20milliGRAM(s) Oral at bedtime  amLODIPine   Tablet 5milliGRAM(s) Oral daily  docusate sodium 100milliGRAM(s) Oral two times a day  vancomycin  IVPB 1000milliGRAM(s) IV Intermittent every 12 hours    MEDICATIONS  (PRN):  dextrose Gel 1Dose(s) Oral once PRN Blood Glucose LESS THAN 70 milliGRAM(s)/deciliter  glucagon  Injectable 1milliGRAM(s) IntraMuscular once PRN Glucose LESS THAN 70 milligrams/deciliter  acetaminophen   Tablet 650milliGRAM(s) Oral every 6 hours PRN For Temp greater than 38 C (100.4 F)  acetaminophen   Tablet. 650milliGRAM(s) Oral every 6 hours PRN Moderate Pain (4 - 6)  lactulose Syrup 20Gram(s) Oral two times a day PRN constipation        ROS: as above; all other systems reviewed and wnl      PHYSICAL EXAMINATION:  Vital Signs Last 24 Hrs  T(C): 36.4, Max: 36.4 (03-04 @ 16:40)  T(F): 97.6, Max: 97.6 (03-04 @ 16:40)  HR: 84 (70 - 84)  BP: 113/75 (113/75 - 131/68)  BP(mean): --  RR: 16 (16 - 16)  SpO2: 97% (97% - 98%)  CAPILLARY BLOOD GLUCOSE  122 (05 Mar 2017 08:02)  107 (04 Mar 2017 17:00)      GENERAL: elderly female; NAD, well-groomed, well-developed  HEAD:  atraumatic, normocephalic  EYES: sclera anicteric  ENMT: mucous membranes moist  NECK: supple, No JVD  CHEST/LUNG: clear to auscultation bilaterally; no rales, rhonchi, or wheezing b/l  HEART: normal S1, S2  ABDOMEN: BS+, soft, ND, NT   EXTREMITIES:  pulses palpable; no clubbing, cyanosis b/l LEs; LLE swollen from ankle up to thigh, less firmness and induration today  NEURO: awake, alert, interactive; moves all extremities      LABS:                        14.8   3.2   )-----------( 215      ( 05 Mar 2017 08:09 )             45.5     04 Mar 2017 07:30    141    |  107    |  11     ----------------------------<  115    3.9     |  29     |  0.70     Ca    9.7        04 Mar 2017 07:30    TPro  7.9    /  Alb  3.6    /  TBili  0.7    /  DBili  x      /  AST  16     /  ALT  19     /  AlkPhos  192    04 Mar 2017 07:30      ASSESSMENT AND PLAN:  71F, type 2 DM, HTN, hx Cdiff, being tx'd for LLE cellulitis s/p failed o/p abxs (Bactrim), neg blood cxs x2, UA neg, no e/o VTE w/neg b/l lower extrem dopplers    ID/MSK:  -IV abxs cvrg w/zosyn, vanco; following vanco levels  -WBC shows leukopenia, monitoring in setting of acute infection  -f/u cxs  -ID/Mangraj following; plan for transition to oral abxs soon    ENDO: DM - continue glycemic tx w/insulin SS; FSBG in acceptable range    CV: HTN - stable hemodynamics; continue Norvasc; continue cardioprotective lipitor    OTHER: dvt prophy CC/F/U for: LLE cellulitis    INTERVAL HPI/OVERNIGHT EVENTS:  Pt seen and examined at bedside. Feels well; no complaints; leg pain impvd    Allergies/Intolerance: No Known Allergies      MEDICATIONS  (STANDING):  piperacillin/tazobactam IVPB. 3.375Gram(s) IV Intermittent every 8 hours  insulin lispro (HumaLOG) corrective regimen sliding scale  SubCutaneous three times a day before meals  insulin lispro (HumaLOG) corrective regimen sliding scale  SubCutaneous at bedtime  dextrose 50% Injectable 12.5Gram(s) IV Push once  dextrose 50% Injectable 25Gram(s) IV Push once  dextrose 50% Injectable 25Gram(s) IV Push once  heparin  Injectable 5000Unit(s) SubCutaneous every 12 hours  atorvastatin 20milliGRAM(s) Oral at bedtime  amLODIPine   Tablet 5milliGRAM(s) Oral daily  docusate sodium 100milliGRAM(s) Oral two times a day  vancomycin  IVPB 1000milliGRAM(s) IV Intermittent every 12 hours    MEDICATIONS  (PRN):  dextrose Gel 1Dose(s) Oral once PRN Blood Glucose LESS THAN 70 milliGRAM(s)/deciliter  glucagon  Injectable 1milliGRAM(s) IntraMuscular once PRN Glucose LESS THAN 70 milligrams/deciliter  acetaminophen   Tablet 650milliGRAM(s) Oral every 6 hours PRN For Temp greater than 38 C (100.4 F)  acetaminophen   Tablet. 650milliGRAM(s) Oral every 6 hours PRN Moderate Pain (4 - 6)  lactulose Syrup 20Gram(s) Oral two times a day PRN constipation        ROS: as above; all other systems reviewed and wnl      PHYSICAL EXAMINATION:  Vital Signs Last 24 Hrs  T(C): 36.4, Max: 36.4 (03-04 @ 16:40)  T(F): 97.6, Max: 97.6 (03-04 @ 16:40)  HR: 84 (70 - 84)  BP: 113/75 (113/75 - 131/68)  BP(mean): --  RR: 16 (16 - 16)  SpO2: 97% (97% - 98%)  CAPILLARY BLOOD GLUCOSE  122 (05 Mar 2017 08:02)  107 (04 Mar 2017 17:00)      GENERAL: elderly female; NAD, well-groomed, well-developed  HEAD:  atraumatic, normocephalic  EYES: sclera anicteric  ENMT: mucous membranes moist  NECK: supple, No JVD  CHEST/LUNG: clear to auscultation bilaterally; no rales, rhonchi, or wheezing b/l  HEART: normal S1, S2  ABDOMEN: BS+, soft, ND, NT   EXTREMITIES:  pulses palpable; no clubbing, cyanosis b/l LEs; LLE swollen from ankle up to thigh, less firmness and induration today  NEURO: awake, alert, interactive; moves all extremities      LABS:                        14.8   3.2   )-----------( 215      ( 05 Mar 2017 08:09 )             45.5     04 Mar 2017 07:30    141    |  107    |  11     ----------------------------<  115    3.9     |  29     |  0.70     Ca    9.7        04 Mar 2017 07:30    TPro  7.9    /  Alb  3.6    /  TBili  0.7    /  DBili  x      /  AST  16     /  ALT  19     /  AlkPhos  192    04 Mar 2017 07:30      ASSESSMENT AND PLAN:  71F, type 2 DM, HTN, hx Cdiff, being tx'd for LLE cellulitis s/p failed o/p abxs (Bactrim), neg blood cxs x2, UA neg, no e/o VTE w/neg b/l lower extrem dopplers    ID/MSK:  -IV abxs cvrg w/zosyn, vanco; following vanco levels  -leukopenia - impvd; likely 2/2 acute infection; monitoring w/daily labs  -f/u cxs  -ID/Mangraj following; plan for transition to oral abxs soon    ENDO: DM - continue glycemic tx w/insulin SS; FSBG in acceptable range    CV: HTN - stable hemodynamics; continue Norvasc; continue cardioprotective lipitor    OTHER: dvt prophy

## 2017-03-05 NOTE — PROGRESS NOTE ADULT - PROBLEM SELECTOR PROBLEM 1
Cellulitis of left lower extremity

## 2017-03-05 NOTE — PROGRESS NOTE ADULT - PROBLEM SELECTOR PROBLEM 3
Hyperlipidemia, unspecified hyperlipidemia type
HLD (hyperlipidemia)
Hyperlipidemia, unspecified hyperlipidemia type
Type 2 diabetes mellitus with hyperglycemia, unspecified long term insulin use status
Type 2 diabetes mellitus with hyperglycemia, unspecified long term insulin use status
Hypertension

## 2017-03-05 NOTE — PROGRESS NOTE ADULT - PROBLEM SELECTOR PROBLEM 2
Type 2 diabetes mellitus without complication, without long-term current use of insulin
Failure of outpatient treatment
Failure of outpatient treatment
Type 2 diabetes mellitus without complication, without long-term current use of insulin

## 2017-03-06 VITALS — HEART RATE: 98 BPM | OXYGEN SATURATION: 98 %

## 2017-03-06 LAB
HCT VFR BLD CALC: 43.6 % — SIGNIFICANT CHANGE UP (ref 34.5–45)
HGB BLD-MCNC: 14.4 G/DL — SIGNIFICANT CHANGE UP (ref 11.5–15.5)
MCHC RBC-ENTMCNC: 25.2 PG — LOW (ref 27–34)
MCHC RBC-ENTMCNC: 32.9 GM/DL — SIGNIFICANT CHANGE UP (ref 32–36)
MCV RBC AUTO: 76.6 FL — LOW (ref 80–100)
PLATELET # BLD AUTO: 207 K/UL — SIGNIFICANT CHANGE UP (ref 150–400)
RBC # BLD: 5.69 M/UL — HIGH (ref 3.8–5.2)
RBC # FLD: 13.1 % — SIGNIFICANT CHANGE UP (ref 11–15)
VANCOMYCIN TROUGH SERPL-MCNC: 7.9 UG/ML — LOW (ref 10–20)
WBC # BLD: 3.1 K/UL — LOW (ref 3.8–10.5)
WBC # FLD AUTO: 3.1 K/UL — LOW (ref 3.8–10.5)

## 2017-03-06 PROCEDURE — 99239 HOSP IP/OBS DSCHRG MGMT >30: CPT

## 2017-03-06 RX ORDER — CEPHALEXIN 500 MG
1 CAPSULE ORAL
Qty: 16 | Refills: 0 | OUTPATIENT
Start: 2017-03-06 | End: 2017-03-10

## 2017-03-06 RX ADMIN — AMLODIPINE BESYLATE 5 MILLIGRAM(S): 2.5 TABLET ORAL at 05:16

## 2017-03-06 RX ADMIN — Medication 250 MILLIGRAM(S): at 09:14

## 2017-03-06 RX ADMIN — Medication 100 MILLIGRAM(S): at 05:16

## 2017-03-06 RX ADMIN — HEPARIN SODIUM 5000 UNIT(S): 5000 INJECTION INTRAVENOUS; SUBCUTANEOUS at 05:16

## 2017-03-06 RX ADMIN — Medication 4: at 11:24

## 2017-03-06 RX ADMIN — PIPERACILLIN AND TAZOBACTAM 25 GRAM(S): 4; .5 INJECTION, POWDER, LYOPHILIZED, FOR SOLUTION INTRAVENOUS at 05:15

## 2017-03-06 NOTE — DISCHARGE NOTE ADULT - PATIENT PORTAL LINK FT
“You can access the [employer] Patient Portal, offered by [facility], by registering with the following website: [email or web address]”

## 2017-03-06 NOTE — DISCHARGE NOTE ADULT - MEDICATION SUMMARY - MEDICATIONS TO TAKE
I will START or STAY ON the medications listed below when I get home from the hospital:    Anaprox sodium 275 mg oral tablet  -- 1 tab(s) by mouth every 8 hours, As Needed  -- Check with your doctor before becoming pregnant.  It is very important that you take or use this exactly as directed.  Do not skip doses or discontinue unless directed by your doctor.  May cause drowsiness or dizziness.  Obtain medical advice before taking any non-prescription drugs as some may affect the action of this medication.  Take with food or milk.    -- Indication: For Pain    metFORMIN 500 mg oral tablet  -- 1 tab(s) by mouth 2 times a day  -- Indication: For Diabetes    atorvastatin  --  by mouth once a day (at bedtime)  -- Indication: For Cholesterol    Norvasc  -- 5 mg  by mouth once a day  -- Indication: For bp    Keflex 500 mg oral capsule  -- 1 cap(s) by mouth every 6 hours x 4 days  -- Finish all this medication unless otherwise directed by prescriber.    -- Indication: For Antibiotic    polyethylene glycol 3350 - oral powder for reconstitution  --  by mouth , As Needed  -- Indication: For Constipation    docusate sodium 100 mg oral capsule  -- 1 cap(s) by mouth 2 times a day  -- Indication: For Constipation

## 2017-03-06 NOTE — DISCHARGE NOTE ADULT - CARE PLAN
Principal Discharge DX:	Cellulitis of left lower extremity  Goal:	continue antibiotics; followup with PMD within 3-5 days of discharge  Instructions for follow-up, activity and diet:	continue antibiotics; followup with PMD within 3-5 days of discharge  Secondary Diagnosis:	Failure of outpatient treatment  Secondary Diagnosis:	Hyperlipidemia, unspecified hyperlipidemia type  Goal:	continue antibiotics; followup with PMD within 3-5 days of discharge  Instructions for follow-up, activity and diet:	continue antibiotics; followup with PMD within 3-5 days of discharge  Secondary Diagnosis:	Essential hypertension  Goal:	continue antibiotics; followup with PMD within 3-5 days of discharge  Instructions for follow-up, activity and diet:	continue antibiotics; followup with PMD within 3-5 days of discharge  Secondary Diagnosis:	Type 2 diabetes mellitus without complication, without long-term current use of insulin  Goal:	continue antibiotics; followup with PMD within 3-5 days of discharge  Instructions for follow-up, activity and diet:	continue antibiotics; followup with PMD within 3-5 days of discharge

## 2017-03-06 NOTE — DISCHARGE NOTE ADULT - HOSPITAL COURSE
71F, type 2 DM, HTN, hx Cdiff, being tx'd for LLE cellulitis s/p failed o/p abxs (Bactrim), neg blood cxs x2, UA neg, no e/o VTE w/neg b/l lower extrem dopplers; clinically impvd w/abxs cvrg w/Zosyn, vanco.  ID consulted, and Dr Aldana followed pt.  As pt impvd, abxs adjusted to Keflex po, which pt will continue x 4 addln days. Pt  eval by PT and recommendations were for continued outpatient physical therapy as previously; pt at baseline functional status.  At the time of discharge        ID/MSK:  -IV abxs cvrg w/zosyn, vanco; following vanco levels  -leukopenia - impvd; likely 2/2 acute infection; monitoring w/daily labs  -f/u cxs  -ID/Jovan following; plan for transition to oral abxs soon    ENDO: DM - continue glycemic tx w/insulin SS; FSBG in acceptable range    CV: HTN - stable hemodynamics; continue Norvasc; continue cardioprotective lipitor 71F, type 2 DM, HTN, hx Cdiff, being tx'd for LLE cellulitis s/p failed o/p abxs (Bactrim), neg blood cxs x2, UA neg, no e/o VTE w/neg b/l lower extrem dopplers; clinically impvd w/abxs cvrg w/Zosyn, vanco.  ID consulted, and Dr Aldana followed pt.  As pt impvd, abxs adjusted to Keflex po, which pt will continue x 4 addln days. Pt also noted to have leukopenia, which was transient, and likely 2/2 acute infection.  Pt  eval by PT and recommendations were for continued outpatient physical therapy as previously; pt at baseline functional status.  At the time of discharge,     PHYSICAL EXAMINATION:  Vital Signs Last 24 Hrs  T(F): 98.4, Max: 98.4 (03-06 @ 11:10)  HR: 98 (81 - 98)  BP: 112/72 (110/71 - 130/70)  RR: 16 (16 - 17)  SpO2: 98% (97% - 99%)  CAPILLARY BLOOD GLUCOSE  113 (06 Mar 2017 08:05)  153 (05 Mar 2017 21:36)    GENERAL: elderly female; NAD, well-groomed, well-developed  HEAD:  atraumatic, normocephalic  EYES: sclera anicteric  ENMT: mucous membranes moist  NECK: supple, No JVD  CHEST/LUNG: clear to auscultation bilaterally; no rales, rhonchi, or wheezing b/l  HEART: normal S1, S2  ABDOMEN: BS+, soft, ND, NT   EXTREMITIES:  pulses palpable; no clubbing, cyanosis b/l LEs; LLE swelling diminished, no induration noted  NEURO: awake, alert, interactive; moves all extremities 71F, type 2 DM, HTN, hx Cdiff, being tx'd for LLE cellulitis s/p failed o/p abxs (Bactrim), neg blood cxs x2, UA neg, no e/o VTE w/neg b/l lower extrem dopplers; clinically impvd w/abxs cvrg w/Zosyn, vanco.  ID consulted, and Dr Aldana followed pt.  As pt impvd, abxs adjusted to Keflex po, which pt will continue x 4 addln days. Pt also noted to have leukopenia, which was transient, and likely 2/2 acute infection.  Pt  eval by PT and recommendations were for continued outpatient physical therapy as previously; pt at baseline functional status.  At the time of discharge,     PHYSICAL EXAMINATION:  Vital Signs Last 24 Hrs  T(F): 98.4, Max: 98.4 (03-06 @ 11:10)  HR: 98 (81 - 98)  BP: 112/72 (110/71 - 130/70)  RR: 16 (16 - 17)  SpO2: 98% (97% - 99%)  CAPILLARY BLOOD GLUCOSE  113 (06 Mar 2017 08:05)  153 (05 Mar 2017 21:36)    GENERAL: elderly female; NAD, well-groomed, well-developed  HEAD:  atraumatic, normocephalic  EYES: sclera anicteric  ENMT: mucous membranes moist  NECK: supple, No JVD  CHEST/LUNG: clear to auscultation bilaterally; no rales, rhonchi, or wheezing b/l  HEART: normal S1, S2  ABDOMEN: BS+, soft, ND, NT   EXTREMITIES:  pulses palpable; no clubbing, cyanosis b/l LEs; LLE swelling diminished, no induration noted  NEURO: awake, alert, interactive; moves all extremities    Bud w/niShellie alonzo ph [phone]; updated re pt diagnoses/mgmt plans/clinical status/discharge plans; all questions answered.

## 2017-03-06 NOTE — DISCHARGE NOTE ADULT - SECONDARY DIAGNOSIS.
Failure of outpatient treatment Hyperlipidemia, unspecified hyperlipidemia type Essential hypertension Type 2 diabetes mellitus without complication, without long-term current use of insulin

## 2017-03-06 NOTE — DISCHARGE NOTE ADULT - MEDICATION SUMMARY - MEDICATIONS TO STOP TAKING
I will STOP taking the medications listed below when I get home from the hospital:    sulfamethoxazole-trimethoprim 800 mg-160 mg oral tablet  -- 2 tab(s) by mouth 2 times a day  -- Avoid prolonged or excessive exposure to direct and/or artificial sunlight while taking this medication.  Finish all this medication unless otherwise directed by prescriber.  Medication should be taken with plenty of water.

## 2017-03-07 LAB
CULTURE RESULTS: SIGNIFICANT CHANGE UP
CULTURE RESULTS: SIGNIFICANT CHANGE UP
SPECIMEN SOURCE: SIGNIFICANT CHANGE UP
SPECIMEN SOURCE: SIGNIFICANT CHANGE UP

## 2017-03-08 DIAGNOSIS — K59.00 CONSTIPATION, UNSPECIFIED: ICD-10-CM

## 2017-03-08 DIAGNOSIS — M19.90 UNSPECIFIED OSTEOARTHRITIS, UNSPECIFIED SITE: ICD-10-CM

## 2017-03-08 DIAGNOSIS — Z79.84 LONG TERM (CURRENT) USE OF ORAL HYPOGLYCEMIC DRUGS: ICD-10-CM

## 2017-03-08 DIAGNOSIS — E11.65 TYPE 2 DIABETES MELLITUS WITH HYPERGLYCEMIA: ICD-10-CM

## 2017-03-08 DIAGNOSIS — Z86.19 PERSONAL HISTORY OF OTHER INFECTIOUS AND PARASITIC DISEASES: ICD-10-CM

## 2017-03-08 DIAGNOSIS — E78.5 HYPERLIPIDEMIA, UNSPECIFIED: ICD-10-CM

## 2017-03-08 DIAGNOSIS — I10 ESSENTIAL (PRIMARY) HYPERTENSION: ICD-10-CM

## 2017-03-08 DIAGNOSIS — D72.819 DECREASED WHITE BLOOD CELL COUNT, UNSPECIFIED: ICD-10-CM

## 2017-03-08 DIAGNOSIS — L03.116 CELLULITIS OF LEFT LOWER LIMB: ICD-10-CM

## 2018-01-13 ENCOUNTER — EMERGENCY (EMERGENCY)
Facility: HOSPITAL | Age: 73
LOS: 0 days | Discharge: HOME | End: 2018-01-13
Attending: STUDENT IN AN ORGANIZED HEALTH CARE EDUCATION/TRAINING PROGRAM
Payer: MEDICARE

## 2018-01-13 VITALS
WEIGHT: 203.93 LBS | OXYGEN SATURATION: 99 % | SYSTOLIC BLOOD PRESSURE: 156 MMHG | DIASTOLIC BLOOD PRESSURE: 88 MMHG | HEART RATE: 106 BPM | TEMPERATURE: 99 F | RESPIRATION RATE: 20 BRPM

## 2018-01-13 VITALS
HEART RATE: 86 BPM | OXYGEN SATURATION: 99 % | TEMPERATURE: 98 F | RESPIRATION RATE: 20 BRPM | DIASTOLIC BLOOD PRESSURE: 76 MMHG | SYSTOLIC BLOOD PRESSURE: 146 MMHG

## 2018-01-13 DIAGNOSIS — R30.0 DYSURIA: ICD-10-CM

## 2018-01-13 DIAGNOSIS — M54.9 DORSALGIA, UNSPECIFIED: ICD-10-CM

## 2018-01-13 DIAGNOSIS — R10.819 ABDOMINAL TENDERNESS, UNSPECIFIED SITE: ICD-10-CM

## 2018-01-13 DIAGNOSIS — N39.0 URINARY TRACT INFECTION, SITE NOT SPECIFIED: ICD-10-CM

## 2018-01-13 DIAGNOSIS — R10.9 UNSPECIFIED ABDOMINAL PAIN: ICD-10-CM

## 2018-01-13 DIAGNOSIS — I10 ESSENTIAL (PRIMARY) HYPERTENSION: ICD-10-CM

## 2018-01-13 DIAGNOSIS — E78.5 HYPERLIPIDEMIA, UNSPECIFIED: ICD-10-CM

## 2018-01-13 DIAGNOSIS — M54.16 RADICULOPATHY, LUMBAR REGION: ICD-10-CM

## 2018-01-13 DIAGNOSIS — M19.90 UNSPECIFIED OSTEOARTHRITIS, UNSPECIFIED SITE: ICD-10-CM

## 2018-01-13 DIAGNOSIS — E11.9 TYPE 2 DIABETES MELLITUS WITHOUT COMPLICATIONS: ICD-10-CM

## 2018-01-13 LAB
ALBUMIN SERPL ELPH-MCNC: 3.8 G/DL — SIGNIFICANT CHANGE UP (ref 3.3–5)
ALP SERPL-CCNC: 216 U/L — HIGH (ref 40–120)
ALT FLD-CCNC: 28 U/L — SIGNIFICANT CHANGE UP (ref 12–78)
ANION GAP SERPL CALC-SCNC: 8 MMOL/L — SIGNIFICANT CHANGE UP (ref 5–17)
APPEARANCE UR: CLEAR — SIGNIFICANT CHANGE UP
AST SERPL-CCNC: 21 U/L — SIGNIFICANT CHANGE UP (ref 15–37)
BASOPHILS # BLD AUTO: 0.1 K/UL — SIGNIFICANT CHANGE UP (ref 0–0.2)
BASOPHILS NFR BLD AUTO: 1.4 % — SIGNIFICANT CHANGE UP (ref 0–2)
BILIRUB SERPL-MCNC: 0.5 MG/DL — SIGNIFICANT CHANGE UP (ref 0.2–1.2)
BILIRUB UR-MCNC: NEGATIVE — SIGNIFICANT CHANGE UP
BUN SERPL-MCNC: 8 MG/DL — SIGNIFICANT CHANGE UP (ref 7–23)
CALCIUM SERPL-MCNC: 9.6 MG/DL — SIGNIFICANT CHANGE UP (ref 8.5–10.1)
CHLORIDE SERPL-SCNC: 109 MMOL/L — HIGH (ref 96–108)
CO2 SERPL-SCNC: 26 MMOL/L — SIGNIFICANT CHANGE UP (ref 22–31)
COLOR SPEC: YELLOW — SIGNIFICANT CHANGE UP
CREAT SERPL-MCNC: 0.74 MG/DL — SIGNIFICANT CHANGE UP (ref 0.5–1.3)
DIFF PNL FLD: NEGATIVE — SIGNIFICANT CHANGE UP
EOSINOPHIL # BLD AUTO: 0 K/UL — SIGNIFICANT CHANGE UP (ref 0–0.5)
EOSINOPHIL NFR BLD AUTO: 0.9 % — SIGNIFICANT CHANGE UP (ref 0–6)
GLUCOSE SERPL-MCNC: 66 MG/DL — LOW (ref 70–99)
GLUCOSE UR QL: NEGATIVE MG/DL — SIGNIFICANT CHANGE UP
HCT VFR BLD CALC: 42.5 % — SIGNIFICANT CHANGE UP (ref 34.5–45)
HGB BLD-MCNC: 13.4 G/DL — SIGNIFICANT CHANGE UP (ref 11.5–15.5)
KETONES UR-MCNC: NEGATIVE — SIGNIFICANT CHANGE UP
LEUKOCYTE ESTERASE UR-ACNC: NEGATIVE — SIGNIFICANT CHANGE UP
LYMPHOCYTES # BLD AUTO: 2.1 K/UL — SIGNIFICANT CHANGE UP (ref 1–3.3)
LYMPHOCYTES # BLD AUTO: 50.4 % — HIGH (ref 13–44)
MCHC RBC-ENTMCNC: 24.7 PG — LOW (ref 27–34)
MCHC RBC-ENTMCNC: 31.6 GM/DL — LOW (ref 32–36)
MCV RBC AUTO: 78.2 FL — LOW (ref 80–100)
MONOCYTES # BLD AUTO: 0.3 K/UL — SIGNIFICANT CHANGE UP (ref 0–0.9)
MONOCYTES NFR BLD AUTO: 8.3 % — SIGNIFICANT CHANGE UP (ref 2–14)
NEUTROPHILS # BLD AUTO: 1.6 K/UL — LOW (ref 1.8–7.4)
NEUTROPHILS NFR BLD AUTO: 39 % — LOW (ref 43–77)
NITRITE UR-MCNC: NEGATIVE — SIGNIFICANT CHANGE UP
PH UR: 7 — SIGNIFICANT CHANGE UP (ref 5–8)
PLATELET # BLD AUTO: 198 K/UL — SIGNIFICANT CHANGE UP (ref 150–400)
POTASSIUM SERPL-MCNC: 3.8 MMOL/L — SIGNIFICANT CHANGE UP (ref 3.5–5.3)
POTASSIUM SERPL-SCNC: 3.8 MMOL/L — SIGNIFICANT CHANGE UP (ref 3.5–5.3)
PROT SERPL-MCNC: 8 GM/DL — SIGNIFICANT CHANGE UP (ref 6–8.3)
PROT UR-MCNC: NEGATIVE MG/DL — SIGNIFICANT CHANGE UP
RBC # BLD: 5.43 M/UL — HIGH (ref 3.8–5.2)
RBC # FLD: 13 % — SIGNIFICANT CHANGE UP (ref 11–15)
SODIUM SERPL-SCNC: 143 MMOL/L — SIGNIFICANT CHANGE UP (ref 135–145)
SP GR SPEC: 1.01 — SIGNIFICANT CHANGE UP (ref 1.01–1.02)
UROBILINOGEN FLD QL: NEGATIVE MG/DL — SIGNIFICANT CHANGE UP
WBC # BLD: 4.2 K/UL — SIGNIFICANT CHANGE UP (ref 3.8–10.5)
WBC # FLD AUTO: 4.2 K/UL — SIGNIFICANT CHANGE UP (ref 3.8–10.5)

## 2018-01-13 PROCEDURE — 74177 CT ABD & PELVIS W/CONTRAST: CPT | Mod: 26

## 2018-01-13 PROCEDURE — 93010 ELECTROCARDIOGRAM REPORT: CPT

## 2018-01-13 PROCEDURE — 72100 X-RAY EXAM L-S SPINE 2/3 VWS: CPT | Mod: 26

## 2018-01-13 PROCEDURE — 99284 EMERGENCY DEPT VISIT MOD MDM: CPT

## 2018-01-13 RX ORDER — MOXIFLOXACIN HYDROCHLORIDE TABLETS, 400 MG 400 MG/1
1 TABLET, FILM COATED ORAL
Qty: 14 | Refills: 0 | OUTPATIENT
Start: 2018-01-13 | End: 2018-01-19

## 2018-01-13 RX ORDER — METHOCARBAMOL 500 MG/1
500 TABLET, FILM COATED ORAL ONCE
Qty: 0 | Refills: 0 | Status: COMPLETED | OUTPATIENT
Start: 2018-01-13 | End: 2018-01-13

## 2018-01-13 RX ORDER — TRAMADOL HYDROCHLORIDE 50 MG/1
50 TABLET ORAL ONCE
Qty: 0 | Refills: 0 | Status: DISCONTINUED | OUTPATIENT
Start: 2018-01-13 | End: 2018-01-13

## 2018-01-13 RX ORDER — KETOROLAC TROMETHAMINE 30 MG/ML
15 SYRINGE (ML) INJECTION ONCE
Qty: 0 | Refills: 0 | Status: DISCONTINUED | OUTPATIENT
Start: 2018-01-13 | End: 2018-01-13

## 2018-01-13 RX ORDER — CIPROFLOXACIN LACTATE 400MG/40ML
500 VIAL (ML) INTRAVENOUS ONCE
Qty: 0 | Refills: 0 | Status: COMPLETED | OUTPATIENT
Start: 2018-01-13 | End: 2018-01-13

## 2018-01-13 RX ADMIN — Medication 500 MILLIGRAM(S): at 16:56

## 2018-01-13 RX ADMIN — TRAMADOL HYDROCHLORIDE 50 MILLIGRAM(S): 50 TABLET ORAL at 14:59

## 2018-01-13 RX ADMIN — Medication 15 MILLIGRAM(S): at 12:47

## 2018-01-13 RX ADMIN — METHOCARBAMOL 500 MILLIGRAM(S): 500 TABLET, FILM COATED ORAL at 12:47

## 2018-01-13 RX ADMIN — Medication 15 MILLIGRAM(S): at 14:59

## 2018-01-13 RX ADMIN — TRAMADOL HYDROCHLORIDE 50 MILLIGRAM(S): 50 TABLET ORAL at 12:47

## 2018-01-13 NOTE — ED ADULT NURSE NOTE - ADDITIONAL PRINTED INSTRUCTIONS GIVEN
discharged home/assisted living, instructed to follow up with Dr Lerman, verbalized understanding of instructions

## 2018-01-13 NOTE — ED PROVIDER NOTE - OBJECTIVE STATEMENT
72 year old female presents today c/o suprapubic tenderness, dysuria and frequency since Thursday (-) nausea or vomiting (-) fevers or chills +acute on chronic right lower back pain radiating into her right leg with paresthesias (-) urinary or bowel incontinence (-) hematuria

## 2018-01-13 NOTE — ED ADULT NURSE NOTE - OBJECTIVE STATEMENT
assumed care of pt in bed. pt alert and oriented x3. brought in by EMS from [facility]. Pt c/o difficulty urinating and burning for 2 days, lower right back pain 6/10  that radiated to the abdomen and right knee when sitting up. pt dneies abd pain on palpation

## 2018-01-13 NOTE — ED ADULT TRIAGE NOTE - CHIEF COMPLAINT QUOTE
pt complaining of lower abdominal and back pain as well as dysuria times 2 months. pt has history of htn and dm.

## 2018-01-14 LAB
CULTURE RESULTS: SIGNIFICANT CHANGE UP
SPECIMEN SOURCE: SIGNIFICANT CHANGE UP

## 2018-02-03 ENCOUNTER — EMERGENCY (EMERGENCY)
Facility: HOSPITAL | Age: 73
LOS: 0 days | Discharge: HOME | End: 2018-02-03
Attending: EMERGENCY MEDICINE
Payer: MEDICARE

## 2018-02-03 VITALS
SYSTOLIC BLOOD PRESSURE: 146 MMHG | WEIGHT: 203.93 LBS | DIASTOLIC BLOOD PRESSURE: 98 MMHG | HEART RATE: 93 BPM | OXYGEN SATURATION: 95 % | TEMPERATURE: 99 F | RESPIRATION RATE: 19 BRPM | HEIGHT: 66 IN

## 2018-02-03 VITALS
HEART RATE: 82 BPM | DIASTOLIC BLOOD PRESSURE: 83 MMHG | RESPIRATION RATE: 18 BRPM | SYSTOLIC BLOOD PRESSURE: 143 MMHG | OXYGEN SATURATION: 96 % | TEMPERATURE: 98 F

## 2018-02-03 DIAGNOSIS — N39.0 URINARY TRACT INFECTION, SITE NOT SPECIFIED: ICD-10-CM

## 2018-02-03 DIAGNOSIS — I10 ESSENTIAL (PRIMARY) HYPERTENSION: ICD-10-CM

## 2018-02-03 DIAGNOSIS — R10.9 UNSPECIFIED ABDOMINAL PAIN: ICD-10-CM

## 2018-02-03 DIAGNOSIS — R30.0 DYSURIA: ICD-10-CM

## 2018-02-03 LAB
ALBUMIN SERPL ELPH-MCNC: 3.6 G/DL — SIGNIFICANT CHANGE UP (ref 3.3–5)
ALP SERPL-CCNC: 192 U/L — HIGH (ref 40–120)
ALT FLD-CCNC: 26 U/L — SIGNIFICANT CHANGE UP (ref 12–78)
ANION GAP SERPL CALC-SCNC: 6 MMOL/L — SIGNIFICANT CHANGE UP (ref 5–17)
APPEARANCE UR: CLEAR — SIGNIFICANT CHANGE UP
AST SERPL-CCNC: 13 U/L — LOW (ref 15–37)
BACTERIA # UR AUTO: ABNORMAL
BASOPHILS # BLD AUTO: 0.04 K/UL — SIGNIFICANT CHANGE UP (ref 0–0.2)
BASOPHILS NFR BLD AUTO: 1.1 % — SIGNIFICANT CHANGE UP (ref 0–2)
BILIRUB SERPL-MCNC: 0.3 MG/DL — SIGNIFICANT CHANGE UP (ref 0.2–1.2)
BILIRUB UR-MCNC: NEGATIVE — SIGNIFICANT CHANGE UP
BUN SERPL-MCNC: 9 MG/DL — SIGNIFICANT CHANGE UP (ref 7–23)
CALCIUM SERPL-MCNC: 9.4 MG/DL — SIGNIFICANT CHANGE UP (ref 8.5–10.1)
CHLORIDE SERPL-SCNC: 108 MMOL/L — SIGNIFICANT CHANGE UP (ref 96–108)
CO2 SERPL-SCNC: 28 MMOL/L — SIGNIFICANT CHANGE UP (ref 22–31)
COD CRY URNS QL: ABNORMAL
COLOR SPEC: YELLOW — SIGNIFICANT CHANGE UP
CREAT SERPL-MCNC: 0.69 MG/DL — SIGNIFICANT CHANGE UP (ref 0.5–1.3)
DIFF PNL FLD: NEGATIVE — SIGNIFICANT CHANGE UP
EOSINOPHIL # BLD AUTO: 0.04 K/UL — SIGNIFICANT CHANGE UP (ref 0–0.5)
EOSINOPHIL NFR BLD AUTO: 1.1 % — SIGNIFICANT CHANGE UP (ref 0–6)
EPI CELLS # UR: ABNORMAL
GLUCOSE SERPL-MCNC: 87 MG/DL — SIGNIFICANT CHANGE UP (ref 70–99)
GLUCOSE UR QL: NEGATIVE MG/DL — SIGNIFICANT CHANGE UP
HCT VFR BLD CALC: 39.8 % — SIGNIFICANT CHANGE UP (ref 34.5–45)
HGB BLD-MCNC: 12.9 G/DL — SIGNIFICANT CHANGE UP (ref 11.5–15.5)
IMM GRANULOCYTES NFR BLD AUTO: 0 % — SIGNIFICANT CHANGE UP (ref 0–1.5)
KETONES UR-MCNC: NEGATIVE — SIGNIFICANT CHANGE UP
LACTATE SERPL-SCNC: 2 MMOL/L — SIGNIFICANT CHANGE UP (ref 0.7–2)
LEUKOCYTE ESTERASE UR-ACNC: ABNORMAL
LYMPHOCYTES # BLD AUTO: 2.04 K/UL — SIGNIFICANT CHANGE UP (ref 1–3.3)
LYMPHOCYTES # BLD AUTO: 54.4 % — HIGH (ref 13–44)
MANUAL SMEAR VERIFICATION: SIGNIFICANT CHANGE UP
MCHC RBC-ENTMCNC: 24.1 PG — LOW (ref 27–34)
MCHC RBC-ENTMCNC: 32.4 GM/DL — SIGNIFICANT CHANGE UP (ref 32–36)
MCV RBC AUTO: 74.3 FL — LOW (ref 80–100)
MONOCYTES # BLD AUTO: 0.32 K/UL — SIGNIFICANT CHANGE UP (ref 0–0.9)
MONOCYTES NFR BLD AUTO: 8.5 % — SIGNIFICANT CHANGE UP (ref 2–14)
NEUTROPHILS # BLD AUTO: 1.31 K/UL — LOW (ref 1.8–7.4)
NEUTROPHILS NFR BLD AUTO: 34.9 % — LOW (ref 43–77)
NITRITE UR-MCNC: NEGATIVE — SIGNIFICANT CHANGE UP
NRBC # BLD: 0 /100 WBCS — SIGNIFICANT CHANGE UP (ref 0–0)
PH UR: 6 — SIGNIFICANT CHANGE UP (ref 5–8)
PLAT MORPH BLD: NORMAL — SIGNIFICANT CHANGE UP
PLATELET # BLD AUTO: 193 K/UL — SIGNIFICANT CHANGE UP (ref 150–400)
POTASSIUM SERPL-MCNC: 3.5 MMOL/L — SIGNIFICANT CHANGE UP (ref 3.5–5.3)
POTASSIUM SERPL-SCNC: 3.5 MMOL/L — SIGNIFICANT CHANGE UP (ref 3.5–5.3)
PROT SERPL-MCNC: 7.4 GM/DL — SIGNIFICANT CHANGE UP (ref 6–8.3)
PROT UR-MCNC: NEGATIVE MG/DL — SIGNIFICANT CHANGE UP
RBC # BLD: 5.36 M/UL — HIGH (ref 3.8–5.2)
RBC # FLD: 15.4 % — HIGH (ref 10.3–14.5)
RBC BLD AUTO: SIGNIFICANT CHANGE UP
SODIUM SERPL-SCNC: 142 MMOL/L — SIGNIFICANT CHANGE UP (ref 135–145)
SP GR SPEC: 1.02 — SIGNIFICANT CHANGE UP (ref 1.01–1.02)
UROBILINOGEN FLD QL: NEGATIVE MG/DL — SIGNIFICANT CHANGE UP
WBC # BLD: 3.75 K/UL — LOW (ref 3.8–10.5)
WBC # FLD AUTO: 3.75 K/UL — LOW (ref 3.8–10.5)
WBC UR QL: SIGNIFICANT CHANGE UP

## 2018-02-03 PROCEDURE — 99284 EMERGENCY DEPT VISIT MOD MDM: CPT

## 2018-02-03 RX ORDER — SODIUM CHLORIDE 9 MG/ML
1000 INJECTION INTRAMUSCULAR; INTRAVENOUS; SUBCUTANEOUS ONCE
Qty: 0 | Refills: 0 | Status: COMPLETED | OUTPATIENT
Start: 2018-02-03 | End: 2018-02-03

## 2018-02-03 RX ORDER — CEFTRIAXONE 500 MG/1
1 INJECTION, POWDER, FOR SOLUTION INTRAMUSCULAR; INTRAVENOUS ONCE
Qty: 0 | Refills: 0 | Status: COMPLETED | OUTPATIENT
Start: 2018-02-03 | End: 2018-02-03

## 2018-02-03 RX ORDER — KETOROLAC TROMETHAMINE 30 MG/ML
15 SYRINGE (ML) INJECTION ONCE
Qty: 0 | Refills: 0 | Status: DISCONTINUED | OUTPATIENT
Start: 2018-02-03 | End: 2018-02-03

## 2018-02-03 RX ORDER — CEFPODOXIME PROXETIL 100 MG
1 TABLET ORAL
Qty: 14 | Refills: 0 | OUTPATIENT
Start: 2018-02-03 | End: 2018-02-09

## 2018-02-03 RX ORDER — PHENAZOPYRIDINE HCL 100 MG
100 TABLET ORAL ONCE
Qty: 0 | Refills: 0 | Status: COMPLETED | OUTPATIENT
Start: 2018-02-03 | End: 2018-02-03

## 2018-02-03 RX ADMIN — CEFTRIAXONE 100 GRAM(S): 500 INJECTION, POWDER, FOR SOLUTION INTRAMUSCULAR; INTRAVENOUS at 13:10

## 2018-02-03 RX ADMIN — Medication 15 MILLIGRAM(S): at 13:00

## 2018-02-03 RX ADMIN — SODIUM CHLORIDE 1000 MILLILITER(S): 9 INJECTION INTRAMUSCULAR; INTRAVENOUS; SUBCUTANEOUS at 12:26

## 2018-02-03 RX ADMIN — Medication 15 MILLIGRAM(S): at 12:26

## 2018-02-03 RX ADMIN — Medication 100 MILLIGRAM(S): at 12:26

## 2018-02-03 NOTE — ED ADULT NURSE NOTE - OBJECTIVE STATEMENT
Lower pain, vaginal pain while urinating. increase in urinary frequency and urgency.  Pain in the lower abd, no pain in the genitals, no discharge, no odor.  Abd distended.  Lower right flank pain, radiates down the right leg.  Pain has been gradually getting worse.

## 2018-02-03 NOTE — ED PROVIDER NOTE - OBJECTIVE STATEMENT
Pertinent PMH/PSH/FHx/SHx and Review of Systems contained within:  Patient presents to the ED for dysuria.  PMH of HTN, HLD, UTI.  Patient states feels like her UTI.  VSS.  no other complaints.  Sx for 2 days.  otherwise baseline.  Non toxic.  Well appearing. No aggravating or relieving factors. No other pertinent PMH.  No other pertinent PSH.  No other pertinent FHx.  Patient denies EtOH/tobacco/illicit substance use. No fever/chills, No photophobia/eye pain/changes in vision, No ear pain/sore throat/dysphagia, No chest pain/palpitations, no SOB/cough/wheeze/stridor, No abdominal pain, No N/V/D, no discharge, No neck/back pain, no rash, no changes in neurological status/function.

## 2018-02-03 NOTE — ED PROVIDER NOTE - MEDICAL DECISION MAKING DETAILS
Patient presnet with dysuria.  Lab values do not require emergent intervention. Patient feeling "much better."  UA with possible UTI.  given last UCx results, will treate ceftriaxone in ED and cefpoxidime as outpatient.  Patient smiling.  wanst to d/c.  VSS.  Patient given prescription medications for their condition and advised to take them as prescribed and check with their Primary Care Provider if any questions arise. Discussed results and outcome of testing with the patient.  Patient advised to please follow up with their primary care doctor within the next 24 hours and return to the Emergency Department for worsening symptoms or any other concerns.  Patient advised that their doctor may call  to follow up on the specific results of the tests performed today in the emergency department. Non toxic.  Well appearing. Uneventful ED observation period.

## 2018-02-03 NOTE — ED ADULT TRIAGE NOTE - CHIEF COMPLAINT QUOTE
patient c/o of R flank pain radiating in lower abdomen and R leg started 3 days ago with frequent urination and dysuria , no blood in urine denied N/V

## 2018-02-04 LAB
CULTURE RESULTS: SIGNIFICANT CHANGE UP
SPECIMEN SOURCE: SIGNIFICANT CHANGE UP

## 2018-06-26 ENCOUNTER — EMERGENCY (EMERGENCY)
Facility: HOSPITAL | Age: 73
LOS: 0 days | Discharge: HOME | End: 2018-06-26
Attending: EMERGENCY MEDICINE
Payer: MEDICARE

## 2018-06-26 VITALS
SYSTOLIC BLOOD PRESSURE: 144 MMHG | OXYGEN SATURATION: 97 % | RESPIRATION RATE: 17 BRPM | HEART RATE: 96 BPM | TEMPERATURE: 98 F | DIASTOLIC BLOOD PRESSURE: 90 MMHG

## 2018-06-26 VITALS
RESPIRATION RATE: 18 BRPM | HEART RATE: 88 BPM | WEIGHT: 201.94 LBS | DIASTOLIC BLOOD PRESSURE: 91 MMHG | SYSTOLIC BLOOD PRESSURE: 152 MMHG | OXYGEN SATURATION: 97 % | HEIGHT: 65 IN | TEMPERATURE: 98 F

## 2018-06-26 DIAGNOSIS — M54.9 DORSALGIA, UNSPECIFIED: ICD-10-CM

## 2018-06-26 DIAGNOSIS — E78.5 HYPERLIPIDEMIA, UNSPECIFIED: ICD-10-CM

## 2018-06-26 DIAGNOSIS — I10 ESSENTIAL (PRIMARY) HYPERTENSION: ICD-10-CM

## 2018-06-26 DIAGNOSIS — N39.0 URINARY TRACT INFECTION, SITE NOT SPECIFIED: ICD-10-CM

## 2018-06-26 DIAGNOSIS — R10.30 LOWER ABDOMINAL PAIN, UNSPECIFIED: ICD-10-CM

## 2018-06-26 DIAGNOSIS — E11.9 TYPE 2 DIABETES MELLITUS WITHOUT COMPLICATIONS: ICD-10-CM

## 2018-06-26 DIAGNOSIS — M19.90 UNSPECIFIED OSTEOARTHRITIS, UNSPECIFIED SITE: ICD-10-CM

## 2018-06-26 LAB
ALBUMIN SERPL ELPH-MCNC: 3.5 G/DL — SIGNIFICANT CHANGE UP (ref 3.3–5)
ALP SERPL-CCNC: 198 U/L — HIGH (ref 40–120)
ALT FLD-CCNC: 36 U/L — SIGNIFICANT CHANGE UP (ref 12–78)
ANION GAP SERPL CALC-SCNC: 6 MMOL/L — SIGNIFICANT CHANGE UP (ref 5–17)
APPEARANCE UR: CLEAR — SIGNIFICANT CHANGE UP
AST SERPL-CCNC: 20 U/L — SIGNIFICANT CHANGE UP (ref 15–37)
BACTERIA # UR AUTO: ABNORMAL
BASOPHILS # BLD AUTO: 0.04 K/UL — SIGNIFICANT CHANGE UP (ref 0–0.2)
BASOPHILS NFR BLD AUTO: 1 % — SIGNIFICANT CHANGE UP (ref 0–2)
BILIRUB SERPL-MCNC: 0.4 MG/DL — SIGNIFICANT CHANGE UP (ref 0.2–1.2)
BILIRUB UR-MCNC: NEGATIVE — SIGNIFICANT CHANGE UP
BUN SERPL-MCNC: 9 MG/DL — SIGNIFICANT CHANGE UP (ref 7–23)
CALCIUM SERPL-MCNC: 9.7 MG/DL — SIGNIFICANT CHANGE UP (ref 8.5–10.1)
CHLORIDE SERPL-SCNC: 109 MMOL/L — HIGH (ref 96–108)
CO2 SERPL-SCNC: 27 MMOL/L — SIGNIFICANT CHANGE UP (ref 22–31)
COLOR SPEC: YELLOW — SIGNIFICANT CHANGE UP
CREAT SERPL-MCNC: 0.81 MG/DL — SIGNIFICANT CHANGE UP (ref 0.5–1.3)
DIFF PNL FLD: ABNORMAL
EOSINOPHIL # BLD AUTO: 0.03 K/UL — SIGNIFICANT CHANGE UP (ref 0–0.5)
EOSINOPHIL NFR BLD AUTO: 0.8 % — SIGNIFICANT CHANGE UP (ref 0–6)
EPI CELLS # UR: SIGNIFICANT CHANGE UP
GLUCOSE SERPL-MCNC: 130 MG/DL — HIGH (ref 70–99)
GLUCOSE UR QL: NEGATIVE MG/DL — SIGNIFICANT CHANGE UP
HCT VFR BLD CALC: 41.3 % — SIGNIFICANT CHANGE UP (ref 34.5–45)
HGB BLD-MCNC: 13.1 G/DL — SIGNIFICANT CHANGE UP (ref 11.5–15.5)
IMM GRANULOCYTES NFR BLD AUTO: 0 % — SIGNIFICANT CHANGE UP (ref 0–1.5)
KETONES UR-MCNC: NEGATIVE — SIGNIFICANT CHANGE UP
LACTATE SERPL-SCNC: 2 MMOL/L — SIGNIFICANT CHANGE UP (ref 0.7–2)
LEUKOCYTE ESTERASE UR-ACNC: ABNORMAL
LIDOCAIN IGE QN: 259 U/L — SIGNIFICANT CHANGE UP (ref 73–393)
LYMPHOCYTES # BLD AUTO: 1.64 K/UL — SIGNIFICANT CHANGE UP (ref 1–3.3)
LYMPHOCYTES # BLD AUTO: 42.1 % — SIGNIFICANT CHANGE UP (ref 13–44)
MCHC RBC-ENTMCNC: 23.9 PG — LOW (ref 27–34)
MCHC RBC-ENTMCNC: 31.7 GM/DL — LOW (ref 32–36)
MCV RBC AUTO: 75.5 FL — LOW (ref 80–100)
MONOCYTES # BLD AUTO: 0.26 K/UL — SIGNIFICANT CHANGE UP (ref 0–0.9)
MONOCYTES NFR BLD AUTO: 6.7 % — SIGNIFICANT CHANGE UP (ref 2–14)
NEUTROPHILS # BLD AUTO: 1.93 K/UL — SIGNIFICANT CHANGE UP (ref 1.8–7.4)
NEUTROPHILS NFR BLD AUTO: 49.4 % — SIGNIFICANT CHANGE UP (ref 43–77)
NITRITE UR-MCNC: NEGATIVE — SIGNIFICANT CHANGE UP
NRBC # BLD: 0 /100 WBCS — SIGNIFICANT CHANGE UP (ref 0–0)
PH UR: 7 — SIGNIFICANT CHANGE UP (ref 5–8)
PLATELET # BLD AUTO: 210 K/UL — SIGNIFICANT CHANGE UP (ref 150–400)
POTASSIUM SERPL-MCNC: 3.7 MMOL/L — SIGNIFICANT CHANGE UP (ref 3.5–5.3)
POTASSIUM SERPL-SCNC: 3.7 MMOL/L — SIGNIFICANT CHANGE UP (ref 3.5–5.3)
PROT SERPL-MCNC: 7.8 GM/DL — SIGNIFICANT CHANGE UP (ref 6–8.3)
PROT UR-MCNC: 15 MG/DL
RBC # BLD: 5.47 M/UL — HIGH (ref 3.8–5.2)
RBC # FLD: 15.3 % — HIGH (ref 10.3–14.5)
RBC CASTS # UR COMP ASSIST: SIGNIFICANT CHANGE UP /HPF (ref 0–4)
SODIUM SERPL-SCNC: 142 MMOL/L — SIGNIFICANT CHANGE UP (ref 135–145)
SP GR SPEC: 1.01 — SIGNIFICANT CHANGE UP (ref 1.01–1.02)
UROBILINOGEN FLD QL: NEGATIVE MG/DL — SIGNIFICANT CHANGE UP
WBC # BLD: 3.9 K/UL — SIGNIFICANT CHANGE UP (ref 3.8–10.5)
WBC # FLD AUTO: 3.9 K/UL — SIGNIFICANT CHANGE UP (ref 3.8–10.5)
WBC UR QL: SIGNIFICANT CHANGE UP

## 2018-06-26 PROCEDURE — 99284 EMERGENCY DEPT VISIT MOD MDM: CPT

## 2018-06-26 PROCEDURE — 74177 CT ABD & PELVIS W/CONTRAST: CPT | Mod: 26

## 2018-06-26 RX ORDER — PHENAZOPYRIDINE HCL 100 MG
2 TABLET ORAL
Qty: 4 | Refills: 0 | OUTPATIENT
Start: 2018-06-26 | End: 2018-06-27

## 2018-06-26 RX ORDER — CEPHALEXIN 500 MG
1 CAPSULE ORAL
Qty: 21 | Refills: 0 | OUTPATIENT
Start: 2018-06-26 | End: 2018-07-02

## 2018-06-26 RX ORDER — IOHEXOL 300 MG/ML
30 INJECTION, SOLUTION INTRAVENOUS ONCE
Qty: 0 | Refills: 0 | Status: COMPLETED | OUTPATIENT
Start: 2018-06-26 | End: 2018-06-26

## 2018-06-26 RX ORDER — CEFTRIAXONE 500 MG/1
1 INJECTION, POWDER, FOR SOLUTION INTRAMUSCULAR; INTRAVENOUS ONCE
Qty: 0 | Refills: 0 | Status: COMPLETED | OUTPATIENT
Start: 2018-06-26 | End: 2018-06-26

## 2018-06-26 RX ORDER — PHENAZOPYRIDINE HCL 100 MG
200 TABLET ORAL ONCE
Qty: 0 | Refills: 0 | Status: COMPLETED | OUTPATIENT
Start: 2018-06-26 | End: 2018-06-26

## 2018-06-26 RX ADMIN — Medication 200 MILLIGRAM(S): at 17:50

## 2018-06-26 RX ADMIN — IOHEXOL 30 MILLILITER(S): 300 INJECTION, SOLUTION INTRAVENOUS at 12:48

## 2018-06-26 RX ADMIN — CEFTRIAXONE 100 GRAM(S): 500 INJECTION, POWDER, FOR SOLUTION INTRAMUSCULAR; INTRAVENOUS at 12:46

## 2018-06-26 NOTE — ED PROVIDER NOTE - MEDICAL DECISION MAKING DETAILS
Dx: UTI/ ro diverticulitis, no upper abd pain or chest pain to suggest acs  Plan: cbc, cmp, ua, ct abd, reassess

## 2018-06-26 NOTE — ED ADULT NURSE NOTE - OBJECTIVE STATEMENT
AO X3 , FROM ASSISTED LIVING FACILITY C/O PAIN TO LOWER ABDOMINAL AREA . BURNING SENSATION WHILE URINATING , PAIN 8 ON SCALE OF 0-10 , ONSET 1 WEEK AGO. ALSO C/O CONSTIPATION , LAST BM THIS AM , SMALL AND HARD SHE STATED

## 2018-06-26 NOTE — ED ADULT NURSE REASSESSMENT NOTE - NS ED NURSE REASSESS COMMENT FT1
assumed care of pt in bed 26. pt discharge paperwork signed, awaiting taxi to assisted living facility.

## 2018-06-26 NOTE — ED PROVIDER NOTE - OBJECTIVE STATEMENT
Pt is a 71 yo lady with a pmhx of HTN, HL, NIDM who presents to the ED with abdominal pain and dysuria. This started today. Has had uti in the past, feels similar symptoms wise. Pain is in lower abdomen above bladder. No chest pain, no sob, no upper abdominal pain. No n/v/d, no sob, no fevers. No flank pain.

## 2018-06-27 LAB
CULTURE RESULTS: SIGNIFICANT CHANGE UP
SPECIMEN SOURCE: SIGNIFICANT CHANGE UP

## 2018-08-30 ENCOUNTER — EMERGENCY (EMERGENCY)
Facility: HOSPITAL | Age: 73
LOS: 0 days | Discharge: HOME | End: 2018-08-31
Attending: EMERGENCY MEDICINE
Payer: MEDICARE

## 2018-08-30 VITALS
HEART RATE: 96 BPM | TEMPERATURE: 98 F | RESPIRATION RATE: 16 BRPM | SYSTOLIC BLOOD PRESSURE: 154 MMHG | OXYGEN SATURATION: 97 % | DIASTOLIC BLOOD PRESSURE: 96 MMHG

## 2018-08-30 DIAGNOSIS — R53.1 WEAKNESS: ICD-10-CM

## 2018-08-30 DIAGNOSIS — K59.00 CONSTIPATION, UNSPECIFIED: ICD-10-CM

## 2018-08-30 DIAGNOSIS — M51.36 OTHER INTERVERTEBRAL DISC DEGENERATION, LUMBAR REGION: ICD-10-CM

## 2018-08-30 DIAGNOSIS — M51.16 INTERVERTEBRAL DISC DISORDERS WITH RADICULOPATHY, LUMBAR REGION: ICD-10-CM
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
## 2018-08-30 DIAGNOSIS — I10 ESSENTIAL (PRIMARY) HYPERTENSION: ICD-10-CM

## 2018-08-30 DIAGNOSIS — E11.9 TYPE 2 DIABETES MELLITUS WITHOUT COMPLICATIONS: ICD-10-CM

## 2018-08-30 DIAGNOSIS — R26.9 UNSPECIFIED ABNORMALITIES OF GAIT AND MOBILITY: ICD-10-CM

## 2018-08-30 DIAGNOSIS — Z79.4 LONG TERM (CURRENT) USE OF INSULIN: ICD-10-CM

## 2018-08-30 DIAGNOSIS — T14.8XXA OTHER INJURY OF UNSPECIFIED BODY REGION, INITIAL ENCOUNTER: ICD-10-CM

## 2018-08-30 LAB
ALBUMIN SERPL ELPH-MCNC: 3.7 G/DL — SIGNIFICANT CHANGE UP (ref 3.3–5)
ALP SERPL-CCNC: 157 U/L — HIGH (ref 40–120)
ALT FLD-CCNC: 26 U/L — SIGNIFICANT CHANGE UP (ref 12–78)
ANION GAP SERPL CALC-SCNC: 8 MMOL/L — SIGNIFICANT CHANGE UP (ref 5–17)
APPEARANCE UR: CLEAR — SIGNIFICANT CHANGE UP
AST SERPL-CCNC: 15 U/L — SIGNIFICANT CHANGE UP (ref 15–37)
BASOPHILS # BLD AUTO: 0.04 K/UL — SIGNIFICANT CHANGE UP (ref 0–0.2)
BASOPHILS NFR BLD AUTO: 0.7 % — SIGNIFICANT CHANGE UP (ref 0–2)
BILIRUB SERPL-MCNC: 0.3 MG/DL — SIGNIFICANT CHANGE UP (ref 0.2–1.2)
BILIRUB UR-MCNC: NEGATIVE — SIGNIFICANT CHANGE UP
BUN SERPL-MCNC: 10 MG/DL — SIGNIFICANT CHANGE UP (ref 7–23)
CALCIUM SERPL-MCNC: 9.5 MG/DL — SIGNIFICANT CHANGE UP (ref 8.5–10.1)
CHLORIDE SERPL-SCNC: 108 MMOL/L — SIGNIFICANT CHANGE UP (ref 96–108)
CO2 SERPL-SCNC: 28 MMOL/L — SIGNIFICANT CHANGE UP (ref 22–31)
COLOR SPEC: YELLOW — SIGNIFICANT CHANGE UP
CREAT SERPL-MCNC: 0.7 MG/DL — SIGNIFICANT CHANGE UP (ref 0.5–1.3)
DIFF PNL FLD: ABNORMAL
EOSINOPHIL # BLD AUTO: 0.07 K/UL — SIGNIFICANT CHANGE UP (ref 0–0.5)
EOSINOPHIL NFR BLD AUTO: 1.3 % — SIGNIFICANT CHANGE UP (ref 0–6)
GLUCOSE SERPL-MCNC: 137 MG/DL — HIGH (ref 70–99)
GLUCOSE UR QL: NEGATIVE MG/DL — SIGNIFICANT CHANGE UP
HCT VFR BLD CALC: 39.9 % — SIGNIFICANT CHANGE UP (ref 34.5–45)
HGB BLD-MCNC: 12.6 G/DL — SIGNIFICANT CHANGE UP (ref 11.5–15.5)
IMM GRANULOCYTES NFR BLD AUTO: 0.4 % — SIGNIFICANT CHANGE UP (ref 0–1.5)
KETONES UR-MCNC: NEGATIVE — SIGNIFICANT CHANGE UP
LEUKOCYTE ESTERASE UR-ACNC: NEGATIVE — SIGNIFICANT CHANGE UP
LYMPHOCYTES # BLD AUTO: 1.47 K/UL — SIGNIFICANT CHANGE UP (ref 1–3.3)
LYMPHOCYTES # BLD AUTO: 27.2 % — SIGNIFICANT CHANGE UP (ref 13–44)
MCHC RBC-ENTMCNC: 23.9 PG — LOW (ref 27–34)
MCHC RBC-ENTMCNC: 31.6 GM/DL — LOW (ref 32–36)
MCV RBC AUTO: 75.7 FL — LOW (ref 80–100)
MONOCYTES # BLD AUTO: 0.38 K/UL — SIGNIFICANT CHANGE UP (ref 0–0.9)
MONOCYTES NFR BLD AUTO: 7 % — SIGNIFICANT CHANGE UP (ref 2–14)
NEUTROPHILS # BLD AUTO: 3.43 K/UL — SIGNIFICANT CHANGE UP (ref 1.8–7.4)
NEUTROPHILS NFR BLD AUTO: 63.4 % — SIGNIFICANT CHANGE UP (ref 43–77)
NITRITE UR-MCNC: NEGATIVE — SIGNIFICANT CHANGE UP
NRBC # BLD: 0 /100 WBCS — SIGNIFICANT CHANGE UP (ref 0–0)
PH UR: 6 — SIGNIFICANT CHANGE UP (ref 5–8)
PLATELET # BLD AUTO: 215 K/UL — SIGNIFICANT CHANGE UP (ref 150–400)
POTASSIUM SERPL-MCNC: 3.5 MMOL/L — SIGNIFICANT CHANGE UP (ref 3.5–5.3)
POTASSIUM SERPL-SCNC: 3.5 MMOL/L — SIGNIFICANT CHANGE UP (ref 3.5–5.3)
PROT SERPL-MCNC: 7.3 GM/DL — SIGNIFICANT CHANGE UP (ref 6–8.3)
PROT UR-MCNC: 30 MG/DL
RBC # BLD: 5.27 M/UL — HIGH (ref 3.8–5.2)
RBC # FLD: 15.9 % — HIGH (ref 10.3–14.5)
SODIUM SERPL-SCNC: 144 MMOL/L — SIGNIFICANT CHANGE UP (ref 135–145)
SP GR SPEC: 1.01 — SIGNIFICANT CHANGE UP (ref 1.01–1.02)
UROBILINOGEN FLD QL: NEGATIVE MG/DL — SIGNIFICANT CHANGE UP
WBC # BLD: 5.41 K/UL — SIGNIFICANT CHANGE UP (ref 3.8–10.5)
WBC # FLD AUTO: 5.41 K/UL — SIGNIFICANT CHANGE UP (ref 3.8–10.5)

## 2018-08-30 PROCEDURE — 99284 EMERGENCY DEPT VISIT MOD MDM: CPT

## 2018-08-30 RX ORDER — KETOROLAC TROMETHAMINE 30 MG/ML
15 SYRINGE (ML) INJECTION ONCE
Qty: 0 | Refills: 0 | Status: DISCONTINUED | OUTPATIENT
Start: 2018-08-30 | End: 2018-08-30

## 2018-08-30 RX ADMIN — Medication 15 MILLIGRAM(S): at 23:19

## 2018-08-30 NOTE — ED PROVIDER NOTE - MEDICAL DECISION MAKING DETAILS
Patient with lower back pain s/p fall, radiating to left leg.  VSS.  Labs, UA wnl, CT negative for fracture, shows bulging disk. Patient does not have signs of cord compression.  Feels better this morning.  Discussed need for o/p MRI spine, will provide spine referral.  Patient also requesting medication for constipation: no n/v or abd pain.  Will send miralax to pharmacy.  Discussed results and outcome of today's visit with the patient.  Patient advised to please follow up with another healthcare provider within the next 24 hours and return to the Emergency Department for worsening symptoms or any other concerns.  Patient advised that their doctor may call  to follow up on the specific results of the tests performed today in the emergency department.   Patient appears well on discharge.

## 2018-08-30 NOTE — ED ADULT NURSE NOTE - OBJECTIVE STATEMENT
pt states she fell today. pt A&Ox3, arrives from assisted living. c/o L leg and back pain. denies "passing out" or head injury. pt ambulates independently at baseline. no obvious deformity noted to extremity. pt evaluated by MD. Will continue to monitor.

## 2018-08-30 NOTE — ED PROVIDER NOTE - CARE PLAN
Principal Discharge DX:	Sciatica associated with disorder of lumbar spine  Secondary Diagnosis:	Degenerative disc disease, lumbar Principal Discharge DX:	Sciatica associated with disorder of lumbar spine  Secondary Diagnosis:	Degenerative disc disease, lumbar  Secondary Diagnosis:	Constipation

## 2018-08-30 NOTE — ED PROVIDER NOTE - OBJECTIVE STATEMENT
Pertinent PMH/PSH/FHx/SHx and Review of Systems contained within:  ***Creole/English speaking,  used.   Patient presents to the ED for fall.  Comes from assisted living, says that she has BL leg weakness and gait problems, uses walker to ambulate.  While in the bathroom today patient lost her balance and fell, landing on her buttocks.  Denies head injury or LOC, was on the ground for a few minutes before assisted.  Patient complaining of pain to the lower back radiating to the left leg.  Does not take blood thinners.  Patient does not have saddle numbness or urinary incontinence.     Relevant PMHx/SHx/SOCHx/FAMH:  HTN, HLD, DM, gait problems  Patient denies EtOH/tobacco/illicit substance use.    ROS: No fever/chills, No headache/photophobia/eye pain/changes in vision, No ear pain/sore throat/dysphagia, No chest pain/palpitations, no SOB/cough/wheeze/stridor, No abdominal pain, No N/V/D/melena, no dysuria/frequency/discharge, No neck pain, no rash, no changes in neurological status/function.

## 2018-08-30 NOTE — ED ADULT TRIAGE NOTE - CHIEF COMPLAINT QUOTE
Pt presents from [facility] s/p fall. Unk if mechanical fall, c/o left leg and back pain. no shortening or rotation noted.

## 2018-08-30 NOTE — ED ADULT NURSE NOTE - CHIEF COMPLAINT QUOTE
Pt presents from [facility] s/p fall. Unk if mechanical fall, c/o left leg and back pain. no shortening or rotation noted.

## 2018-08-30 NOTE — ED PROVIDER NOTE - PHYSICAL EXAMINATION
Gen: Alert, NAD, well appearing  Head: NC, AT, PERRL, EOMI, normal lids/conjunctiva  ENT: normal hearing, patent oropharynx without erythema/exudate, uvula midline  Neck: +supple, no tenderness/meningismus/JVD, +Trachea midline  Pulm: Bilateral BS, normal resp effort, no wheeze/stridor/retractions  CV: RRR, no M/R/G, +dist pulses  Abd: soft, NT/ND, +BS, no palpable masses  Mskel: no edema/erythema/cyanosis, +midline lumbar pain, no bony tenderness, deformity or swelling to the left leg, ankle, or foot, compartments soft, able to range fully at hip, knee, ankle  Skin: no rash, warm/dry  Neuro: AAOx3, no apparent sensory/motor deficits, coordination intact

## 2018-08-31 VITALS
DIASTOLIC BLOOD PRESSURE: 85 MMHG | RESPIRATION RATE: 18 BRPM | TEMPERATURE: 98 F | HEART RATE: 89 BPM | OXYGEN SATURATION: 98 % | SYSTOLIC BLOOD PRESSURE: 160 MMHG

## 2018-08-31 PROBLEM — I10 ESSENTIAL (PRIMARY) HYPERTENSION: Chronic | Status: ACTIVE | Noted: 2017-03-01

## 2018-08-31 LAB
EPI CELLS # UR: SIGNIFICANT CHANGE UP
RBC CASTS # UR COMP ASSIST: SIGNIFICANT CHANGE UP /HPF (ref 0–4)

## 2018-08-31 PROCEDURE — 72131 CT LUMBAR SPINE W/O DYE: CPT | Mod: 26

## 2018-08-31 RX ORDER — IBUPROFEN 200 MG
1 TABLET ORAL
Qty: 20 | Refills: 0 | OUTPATIENT
Start: 2018-08-31 | End: 2018-09-04

## 2018-08-31 RX ORDER — POLYETHYLENE GLYCOL 3350 17 G/17G
17 POWDER, FOR SOLUTION ORAL
Qty: 120 | Refills: 0 | OUTPATIENT
Start: 2018-08-31 | End: 2018-09-06

## 2018-09-01 LAB
CULTURE RESULTS: SIGNIFICANT CHANGE UP
SPECIMEN SOURCE: SIGNIFICANT CHANGE UP

## 2018-09-19 ENCOUNTER — INPATIENT (INPATIENT)
Facility: HOSPITAL | Age: 73
LOS: 5 days | Discharge: HOME | End: 2018-09-25
Attending: INTERNAL MEDICINE | Admitting: INTERNAL MEDICINE
Payer: MEDICARE

## 2018-09-19 VITALS
HEART RATE: 91 BPM | HEIGHT: 64 IN | WEIGHT: 149.91 LBS | DIASTOLIC BLOOD PRESSURE: 103 MMHG | OXYGEN SATURATION: 100 % | TEMPERATURE: 98 F | RESPIRATION RATE: 18 BRPM | SYSTOLIC BLOOD PRESSURE: 164 MMHG

## 2018-09-19 LAB
BASOPHILS # BLD AUTO: 0.03 K/UL — SIGNIFICANT CHANGE UP (ref 0–0.2)
BASOPHILS NFR BLD AUTO: 0.6 % — SIGNIFICANT CHANGE UP (ref 0–2)
EOSINOPHIL # BLD AUTO: 0.06 K/UL — SIGNIFICANT CHANGE UP (ref 0–0.5)
EOSINOPHIL NFR BLD AUTO: 1.3 % — SIGNIFICANT CHANGE UP (ref 0–6)
HCT VFR BLD CALC: 41.7 % — SIGNIFICANT CHANGE UP (ref 34.5–45)
HGB BLD-MCNC: 13.6 G/DL — SIGNIFICANT CHANGE UP (ref 11.5–15.5)
IMM GRANULOCYTES NFR BLD AUTO: 0.4 % — SIGNIFICANT CHANGE UP (ref 0–1.5)
LYMPHOCYTES # BLD AUTO: 1.7 K/UL — SIGNIFICANT CHANGE UP (ref 1–3.3)
LYMPHOCYTES # BLD AUTO: 36.3 % — SIGNIFICANT CHANGE UP (ref 13–44)
MCHC RBC-ENTMCNC: 24.7 PG — LOW (ref 27–34)
MCHC RBC-ENTMCNC: 32.6 GM/DL — SIGNIFICANT CHANGE UP (ref 32–36)
MCV RBC AUTO: 75.8 FL — LOW (ref 80–100)
MONOCYTES # BLD AUTO: 0.34 K/UL — SIGNIFICANT CHANGE UP (ref 0–0.9)
MONOCYTES NFR BLD AUTO: 7.3 % — SIGNIFICANT CHANGE UP (ref 2–14)
NEUTROPHILS # BLD AUTO: 2.53 K/UL — SIGNIFICANT CHANGE UP (ref 1.8–7.4)
NEUTROPHILS NFR BLD AUTO: 54.1 % — SIGNIFICANT CHANGE UP (ref 43–77)
NRBC # BLD: 0 /100 WBCS — SIGNIFICANT CHANGE UP (ref 0–0)
PLATELET # BLD AUTO: 245 K/UL — SIGNIFICANT CHANGE UP (ref 150–400)
RBC # BLD: 5.5 M/UL — HIGH (ref 3.8–5.2)
RBC # FLD: 15.8 % — HIGH (ref 10.3–14.5)
WBC # BLD: 4.68 K/UL — SIGNIFICANT CHANGE UP (ref 3.8–10.5)
WBC # FLD AUTO: 4.68 K/UL — SIGNIFICANT CHANGE UP (ref 3.8–10.5)

## 2018-09-19 PROCEDURE — 99285 EMERGENCY DEPT VISIT HI MDM: CPT

## 2018-09-19 RX ORDER — ACETAMINOPHEN 500 MG
650 TABLET ORAL ONCE
Qty: 0 | Refills: 0 | Status: COMPLETED | OUTPATIENT
Start: 2018-09-19 | End: 2018-09-19

## 2018-09-19 RX ADMIN — Medication 650 MILLIGRAM(S): at 23:28

## 2018-09-19 NOTE — ED PROVIDER NOTE - MEDICAL DECISION MAKING DETAILS
Patient presents for syncope.  VSS.  Labs, CT, EKG findings reviewed.  Cause for syncope remains unclear.  Neuro intact, initial cardiac work up unremarkable.  Patient is to be admitted to the hospital and the case was discussed with the admitting physician.  Any changes in plan, additional imaging/labs, and further work up will be at the discretion of the admitting physician.

## 2018-09-19 NOTE — ED PROVIDER NOTE - OBJECTIVE STATEMENT
Pertinent PMH/PSH/FHx/SHx and Review of Systems contained within:  Patient presents to the ED for syncope.  Says that she had showered and was coming from the bathroom, en route to her bedroom became dizzy, tried to brace herself, but fell and passed out.  Says that the home aide found her and called 911, she was out for about 5 minutes.  Denies any chest pain or shortness of breath.  Complains of some neck pain.  Denies use of blood thinners.  Says that her BP has been out of control for the last few days, saw an outside PMD for it yesterday and was given new BP meds.     Relevant PMHx/SHx/SOCHx/FAMH:  HTN, HLD, DM, constipation, GERD  Patient denies EtOH/tobacco/illicit substance use.    ROS: No fever/chills, No headache/photophobia/eye pain/changes in vision, No ear pain/sore throat/dysphagia, No chest pain/palpitations, no SOB/cough/wheeze/stridor, No abdominal pain, No N/V/D/melena, no dysuria/frequency/discharge, No back pain, no rash, no changes in neurological status/function. Pertinent PMH/PSH/FHx/SHx and Review of Systems contained within:  Patient presents to the ED for syncope.  Says that she had showered and was coming from the bathroom, en route to her bedroom became dizzy, tried to brace herself, but fell and passed out.  Says that the home aide found her and called 911, she was out for about 5 minutes.  Denies any chest pain or shortness of breath.  Complains of headache and neck pain.  Denies use of blood thinners.  Says that her BP has been out of control for the last few days, saw an outside PMD for it yesterday and was given new BP meds.     Relevant PMHx/SHx/SOCHx/FAMH:  HTN, HLD, DM, constipation, GERD  Patient denies EtOH/tobacco/illicit substance use.    ROS: No fever/chills, No photophobia/eye pain/changes in vision, No ear pain/sore throat/dysphagia, No chest pain/palpitations, no SOB/cough/wheeze/stridor, No abdominal pain, No N/V/D/melena, no dysuria/frequency/discharge, No back pain, no rash, no changes in neurological status/function.

## 2018-09-19 NOTE — ED PROVIDER NOTE - PHYSICAL EXAMINATION
Gen: Alert, NAD  Head: NC, AT, PERRL, EOMI, normal lids/conjunctiva  ENT: normal hearing, patent oropharynx without erythema/exudate, uvula midline  Neck: +midline tenderness, in c collar, meningismus/JVD, +Trachea midline  Pulm: Bilateral BS, normal resp effort, no wheeze/stridor/retractions  CV: RRR, no M/R/G, +dist pulses  Abd: soft, NT/ND, +BS, no palpable masses  Mskel: no edema/erythema/cyanosis  Skin: no rash, warm/dry  Neuro: AAOx3, no apparent sensory/motor deficits, coordination intact

## 2018-09-19 NOTE — ED ADULT TRIAGE NOTE - CHIEF COMPLAINT QUOTE
gat up from the chair , the legs buckle fall backwards, no blood thinner, no loc frontal headache neck pain , C collar on, no blood thinner

## 2018-09-20 DIAGNOSIS — R55 SYNCOPE AND COLLAPSE: ICD-10-CM

## 2018-09-20 DIAGNOSIS — I10 ESSENTIAL (PRIMARY) HYPERTENSION: ICD-10-CM

## 2018-09-20 DIAGNOSIS — E11.9 TYPE 2 DIABETES MELLITUS WITHOUT COMPLICATIONS: ICD-10-CM

## 2018-09-20 DIAGNOSIS — E04.1 NONTOXIC SINGLE THYROID NODULE: ICD-10-CM

## 2018-09-20 LAB
ALBUMIN SERPL ELPH-MCNC: 3.4 G/DL — SIGNIFICANT CHANGE UP (ref 3.3–5)
ALP SERPL-CCNC: 161 U/L — HIGH (ref 40–120)
ALT FLD-CCNC: 28 U/L — SIGNIFICANT CHANGE UP (ref 12–78)
ANION GAP SERPL CALC-SCNC: 12 MMOL/L — SIGNIFICANT CHANGE UP (ref 5–17)
ANION GAP SERPL CALC-SCNC: 8 MMOL/L — SIGNIFICANT CHANGE UP (ref 5–17)
APPEARANCE UR: CLEAR — SIGNIFICANT CHANGE UP
APTT BLD: 28.7 SEC — SIGNIFICANT CHANGE UP (ref 27.5–37.4)
AST SERPL-CCNC: 9 U/L — LOW (ref 15–37)
BACTERIA # UR AUTO: ABNORMAL
BASOPHILS # BLD AUTO: 0.02 K/UL — SIGNIFICANT CHANGE UP (ref 0–0.2)
BASOPHILS NFR BLD AUTO: 0.5 % — SIGNIFICANT CHANGE UP (ref 0–2)
BILIRUB SERPL-MCNC: 0.3 MG/DL — SIGNIFICANT CHANGE UP (ref 0.2–1.2)
BILIRUB UR-MCNC: NEGATIVE — SIGNIFICANT CHANGE UP
BUN SERPL-MCNC: 6 MG/DL — LOW (ref 7–23)
BUN SERPL-MCNC: 9 MG/DL — SIGNIFICANT CHANGE UP (ref 7–23)
CALCIUM SERPL-MCNC: 8.9 MG/DL — SIGNIFICANT CHANGE UP (ref 8.5–10.1)
CALCIUM SERPL-MCNC: 9 MG/DL — SIGNIFICANT CHANGE UP (ref 8.5–10.1)
CHLORIDE SERPL-SCNC: 108 MMOL/L — SIGNIFICANT CHANGE UP (ref 96–108)
CHLORIDE SERPL-SCNC: 110 MMOL/L — HIGH (ref 96–108)
CK MB BLD-MCNC: 1.3 % — SIGNIFICANT CHANGE UP (ref 0–3.5)
CK MB CFR SERPL CALC: 2.5 NG/ML — SIGNIFICANT CHANGE UP (ref 0.5–3.6)
CK SERPL-CCNC: 189 U/L — SIGNIFICANT CHANGE UP (ref 26–192)
CO2 SERPL-SCNC: 24 MMOL/L — SIGNIFICANT CHANGE UP (ref 22–31)
CO2 SERPL-SCNC: 28 MMOL/L — SIGNIFICANT CHANGE UP (ref 22–31)
COLOR SPEC: YELLOW — SIGNIFICANT CHANGE UP
CREAT SERPL-MCNC: 0.63 MG/DL — SIGNIFICANT CHANGE UP (ref 0.5–1.3)
CREAT SERPL-MCNC: 0.68 MG/DL — SIGNIFICANT CHANGE UP (ref 0.5–1.3)
DIFF PNL FLD: NEGATIVE — SIGNIFICANT CHANGE UP
EOSINOPHIL # BLD AUTO: 0.03 K/UL — SIGNIFICANT CHANGE UP (ref 0–0.5)
EOSINOPHIL NFR BLD AUTO: 0.7 % — SIGNIFICANT CHANGE UP (ref 0–6)
EPI CELLS # UR: SIGNIFICANT CHANGE UP
GLUCOSE BLDC GLUCOMTR-MCNC: 106 MG/DL — HIGH (ref 70–99)
GLUCOSE BLDC GLUCOMTR-MCNC: 110 MG/DL — HIGH (ref 70–99)
GLUCOSE BLDC GLUCOMTR-MCNC: 134 MG/DL — HIGH (ref 70–99)
GLUCOSE BLDC GLUCOMTR-MCNC: 98 MG/DL — SIGNIFICANT CHANGE UP (ref 70–99)
GLUCOSE SERPL-MCNC: 105 MG/DL — HIGH (ref 70–99)
GLUCOSE SERPL-MCNC: 133 MG/DL — HIGH (ref 70–99)
GLUCOSE UR QL: NEGATIVE MG/DL — SIGNIFICANT CHANGE UP
HCT VFR BLD CALC: 40.9 % — SIGNIFICANT CHANGE UP (ref 34.5–45)
HGB BLD-MCNC: 13.5 G/DL — SIGNIFICANT CHANGE UP (ref 11.5–15.5)
IMM GRANULOCYTES NFR BLD AUTO: 0.2 % — SIGNIFICANT CHANGE UP (ref 0–1.5)
INR BLD: 1.12 RATIO — SIGNIFICANT CHANGE UP (ref 0.88–1.16)
KETONES UR-MCNC: NEGATIVE — SIGNIFICANT CHANGE UP
LEUKOCYTE ESTERASE UR-ACNC: NEGATIVE — SIGNIFICANT CHANGE UP
LYMPHOCYTES # BLD AUTO: 1.66 K/UL — SIGNIFICANT CHANGE UP (ref 1–3.3)
LYMPHOCYTES # BLD AUTO: 38.9 % — SIGNIFICANT CHANGE UP (ref 13–44)
MCHC RBC-ENTMCNC: 24.9 PG — LOW (ref 27–34)
MCHC RBC-ENTMCNC: 33 GM/DL — SIGNIFICANT CHANGE UP (ref 32–36)
MCV RBC AUTO: 75.5 FL — LOW (ref 80–100)
MONOCYTES # BLD AUTO: 0.33 K/UL — SIGNIFICANT CHANGE UP (ref 0–0.9)
MONOCYTES NFR BLD AUTO: 7.7 % — SIGNIFICANT CHANGE UP (ref 2–14)
NEUTROPHILS # BLD AUTO: 2.22 K/UL — SIGNIFICANT CHANGE UP (ref 1.8–7.4)
NEUTROPHILS NFR BLD AUTO: 52 % — SIGNIFICANT CHANGE UP (ref 43–77)
NITRITE UR-MCNC: NEGATIVE — SIGNIFICANT CHANGE UP
NRBC # BLD: 0 /100 WBCS — SIGNIFICANT CHANGE UP (ref 0–0)
PH UR: 8 — SIGNIFICANT CHANGE UP (ref 5–8)
PLATELET # BLD AUTO: 224 K/UL — SIGNIFICANT CHANGE UP (ref 150–400)
POTASSIUM SERPL-MCNC: 3.3 MMOL/L — LOW (ref 3.5–5.3)
POTASSIUM SERPL-MCNC: 3.3 MMOL/L — LOW (ref 3.5–5.3)
POTASSIUM SERPL-SCNC: 3.3 MMOL/L — LOW (ref 3.5–5.3)
POTASSIUM SERPL-SCNC: 3.3 MMOL/L — LOW (ref 3.5–5.3)
PROT SERPL-MCNC: 7.2 GM/DL — SIGNIFICANT CHANGE UP (ref 6–8.3)
PROT UR-MCNC: NEGATIVE MG/DL — SIGNIFICANT CHANGE UP
PROTHROM AB SERPL-ACNC: 12.2 SEC — SIGNIFICANT CHANGE UP (ref 9.8–12.7)
RBC # BLD: 5.42 M/UL — HIGH (ref 3.8–5.2)
RBC # FLD: 15.8 % — HIGH (ref 10.3–14.5)
RBC CASTS # UR COMP ASSIST: NEGATIVE /HPF — SIGNIFICANT CHANGE UP (ref 0–4)
SODIUM SERPL-SCNC: 144 MMOL/L — SIGNIFICANT CHANGE UP (ref 135–145)
SODIUM SERPL-SCNC: 146 MMOL/L — HIGH (ref 135–145)
SP GR SPEC: 1.01 — SIGNIFICANT CHANGE UP (ref 1.01–1.02)
T4 FREE SERPL-MCNC: 1.4 NG/DL — SIGNIFICANT CHANGE UP (ref 0.9–1.8)
TROPONIN I SERPL-MCNC: <.015 NG/ML — SIGNIFICANT CHANGE UP (ref 0.01–0.04)
TROPONIN I SERPL-MCNC: <.015 NG/ML — SIGNIFICANT CHANGE UP (ref 0.01–0.04)
TSH SERPL-MCNC: 1.52 UU/ML — SIGNIFICANT CHANGE UP (ref 0.36–3.74)
UROBILINOGEN FLD QL: NEGATIVE MG/DL — SIGNIFICANT CHANGE UP
WBC # BLD: 4.27 K/UL — SIGNIFICANT CHANGE UP (ref 3.8–10.5)
WBC # FLD AUTO: 4.27 K/UL — SIGNIFICANT CHANGE UP (ref 3.8–10.5)
WBC UR QL: SIGNIFICANT CHANGE UP

## 2018-09-20 PROCEDURE — 99223 1ST HOSP IP/OBS HIGH 75: CPT

## 2018-09-20 PROCEDURE — 71045 X-RAY EXAM CHEST 1 VIEW: CPT | Mod: 26

## 2018-09-20 PROCEDURE — 72125 CT NECK SPINE W/O DYE: CPT | Mod: 26

## 2018-09-20 PROCEDURE — 70450 CT HEAD/BRAIN W/O DYE: CPT | Mod: 26

## 2018-09-20 RX ORDER — AMLODIPINE BESYLATE 2.5 MG/1
5 TABLET ORAL DAILY
Qty: 0 | Refills: 0 | Status: DISCONTINUED | OUTPATIENT
Start: 2018-09-20 | End: 2018-09-25

## 2018-09-20 RX ORDER — ATORVASTATIN CALCIUM 80 MG/1
40 TABLET, FILM COATED ORAL AT BEDTIME
Qty: 0 | Refills: 0 | Status: DISCONTINUED | OUTPATIENT
Start: 2018-09-20 | End: 2018-09-25

## 2018-09-20 RX ORDER — POTASSIUM CHLORIDE 20 MEQ
40 PACKET (EA) ORAL ONCE
Qty: 0 | Refills: 0 | Status: COMPLETED | OUTPATIENT
Start: 2018-09-20 | End: 2018-09-20

## 2018-09-20 RX ORDER — GLUCAGON INJECTION, SOLUTION 0.5 MG/.1ML
1 INJECTION, SOLUTION SUBCUTANEOUS ONCE
Qty: 0 | Refills: 0 | Status: DISCONTINUED | OUTPATIENT
Start: 2018-09-20 | End: 2018-09-25

## 2018-09-20 RX ORDER — DEXTROSE 50 % IN WATER 50 %
25 SYRINGE (ML) INTRAVENOUS ONCE
Qty: 0 | Refills: 0 | Status: DISCONTINUED | OUTPATIENT
Start: 2018-09-20 | End: 2018-09-25

## 2018-09-20 RX ORDER — SODIUM CHLORIDE 9 MG/ML
1000 INJECTION, SOLUTION INTRAVENOUS
Qty: 0 | Refills: 0 | Status: DISCONTINUED | OUTPATIENT
Start: 2018-09-20 | End: 2018-09-25

## 2018-09-20 RX ORDER — CEFTRIAXONE 500 MG/1
1 INJECTION, POWDER, FOR SOLUTION INTRAMUSCULAR; INTRAVENOUS ONCE
Qty: 0 | Refills: 0 | Status: COMPLETED | OUTPATIENT
Start: 2018-09-20 | End: 2018-09-20

## 2018-09-20 RX ORDER — DEXTROSE 50 % IN WATER 50 %
15 SYRINGE (ML) INTRAVENOUS ONCE
Qty: 0 | Refills: 0 | Status: DISCONTINUED | OUTPATIENT
Start: 2018-09-20 | End: 2018-09-25

## 2018-09-20 RX ORDER — SODIUM CHLORIDE 9 MG/ML
1000 INJECTION INTRAMUSCULAR; INTRAVENOUS; SUBCUTANEOUS ONCE
Qty: 0 | Refills: 0 | Status: COMPLETED | OUTPATIENT
Start: 2018-09-20 | End: 2018-09-20

## 2018-09-20 RX ORDER — CEFTRIAXONE 500 MG/1
INJECTION, POWDER, FOR SOLUTION INTRAMUSCULAR; INTRAVENOUS
Qty: 0 | Refills: 0 | Status: DISCONTINUED | OUTPATIENT
Start: 2018-09-20 | End: 2018-09-21

## 2018-09-20 RX ORDER — DEXTROSE 50 % IN WATER 50 %
12.5 SYRINGE (ML) INTRAVENOUS ONCE
Qty: 0 | Refills: 0 | Status: DISCONTINUED | OUTPATIENT
Start: 2018-09-20 | End: 2018-09-25

## 2018-09-20 RX ORDER — INSULIN LISPRO 100/ML
VIAL (ML) SUBCUTANEOUS
Qty: 0 | Refills: 0 | Status: DISCONTINUED | OUTPATIENT
Start: 2018-09-20 | End: 2018-09-25

## 2018-09-20 RX ORDER — CEFTRIAXONE 500 MG/1
1 INJECTION, POWDER, FOR SOLUTION INTRAMUSCULAR; INTRAVENOUS EVERY 24 HOURS
Qty: 0 | Refills: 0 | Status: DISCONTINUED | OUTPATIENT
Start: 2018-09-21 | End: 2018-09-21

## 2018-09-20 RX ORDER — HEPARIN SODIUM 5000 [USP'U]/ML
5000 INJECTION INTRAVENOUS; SUBCUTANEOUS EVERY 12 HOURS
Qty: 0 | Refills: 0 | Status: DISCONTINUED | OUTPATIENT
Start: 2018-09-20 | End: 2018-09-25

## 2018-09-20 RX ADMIN — SODIUM CHLORIDE 250 MILLILITER(S): 9 INJECTION INTRAMUSCULAR; INTRAVENOUS; SUBCUTANEOUS at 05:19

## 2018-09-20 RX ADMIN — HEPARIN SODIUM 5000 UNIT(S): 5000 INJECTION INTRAVENOUS; SUBCUTANEOUS at 06:08

## 2018-09-20 RX ADMIN — CEFTRIAXONE 100 GRAM(S): 500 INJECTION, POWDER, FOR SOLUTION INTRAMUSCULAR; INTRAVENOUS at 14:40

## 2018-09-20 RX ADMIN — Medication 40 MILLIEQUIVALENT(S): at 11:35

## 2018-09-20 RX ADMIN — AMLODIPINE BESYLATE 5 MILLIGRAM(S): 2.5 TABLET ORAL at 06:08

## 2018-09-20 RX ADMIN — ATORVASTATIN CALCIUM 40 MILLIGRAM(S): 80 TABLET, FILM COATED ORAL at 21:56

## 2018-09-20 RX ADMIN — HEPARIN SODIUM 5000 UNIT(S): 5000 INJECTION INTRAVENOUS; SUBCUTANEOUS at 17:31

## 2018-09-20 RX ADMIN — Medication 40 MILLIEQUIVALENT(S): at 05:19

## 2018-09-20 NOTE — H&P ADULT - NSHPPHYSICALEXAM_GEN_ALL_CORE
T(C): 36.8 (20 Sep 2018 04:06), Max: 36.8 (20 Sep 2018 04:06)  T(F): 98.2 (20 Sep 2018 04:06), Max: 98.2 (20 Sep 2018 04:06)  HR: 89 (20 Sep 2018 04:06) (89 - 93)  BP: 129/78 (20 Sep 2018 04:06) (129/78 - 169/97)  BP(mean): --  RR: 18 (20 Sep 2018 04:06) (17 - 18)  SpO2: 97% (20 Sep 2018 04:06) (96% - 100%)    PHYSICAL EXAM:  GENERAL: NAD, well-groomed, well-developed  HEAD:  Atraumatic, Normocephalic  EYES: EOMI, PERRLA, conjunctiva and sclera clear  ENMT: No tonsillar erythema, exudates, or enlargement; Moist mucous membranes, No lesions  NECK: Supple, No JVD, nodule R  thyroid, non tender  NERVOUS SYSTEM:  Alert & Oriented X3, CN 2-12 intact, no focal deficits  CHEST/LUNG: Clear to percussion bilaterally; No rales, rhonchi, wheezing, or rubs  HEART: Regular rate and rhythm; No murmurs, rubs, or gallops  ABDOMEN: Soft, Nontender, Nondistended; Bowel sounds present  EXTREMITIES:  + Peripheral Pulses, No clubbing, cyanosis, or edema  LYMPH: No lymphadenopathy noted  SKIN: No rashes or lesions

## 2018-09-20 NOTE — CONSULT NOTE ADULT - SUBJECTIVE AND OBJECTIVE BOX
CARDIOLOGY CONSULT NOTE    Patient is a 72y Female with a known history of :  Thyroid nodule (E04.1)  Essential hypertension (I10)  Type 2 diabetes mellitus without complication, without long-term current use of insulin (E11.9)  Syncope and collapse (R55)    HPI:  72y Female PMH HTN, HLD, DM2, constipation, GERD, chronic back pain,  presents to the ED for syncope.  Says that she had showered and was coming from the bathroom, en route to her bedroom her legs became weak and she was unable to support herself and fell to the floor.  Found by HHA and called 911.  Denies any chest pain or shortness of breath.  Complains of headache and neck pain.  Denies use of blood thinners.  Says that her BP has been out of control for the last few days, saw an outside PMD for it yesterday and was given new BP meds.    Of note, pt with similar admission <1 month ago.  As per ER note:  "Comes from assisted living, says that she has BL leg weakness and gait problems, uses walker to ambulate.  While in the bathroom today patient lost her balance and fell, landing on her buttocks.  Denies head injury or LOC, was on the ground for a few minutes before assisted.  Patient complaining of pain to the lower back radiating to the left leg.  Does not take blood thinners.  Patient does not have saddle numbness or urinary incontinence. CT negative for fracture, shows bulging disk. Patient does not have signs of cord compression.  Feels better this morning.  Discussed need for o/p MRI spine, will provide spine referral".  D/c'd back to assisted living.    Currently: still feels LE weakness.  EKG unremarkable.  NSR 80s.  Chano neg.  Denied any cardiac sx.          REVIEW OF SYSTEMS:    CONSTITUTIONAL: No fever, weight loss; + fatigue  EYES: No eye pain, visual disturbances, or discharge  ENMT:  No difficulty hearing, tinnitus, vertigo; No sinus or throat pain  NECK: No pain or stiffness  BREASTS: No pain, masses, or nipple discharge  RESPIRATORY: No cough, wheezing, chills or hemoptysis; No shortness of breath  CARDIOVASCULAR: No chest pain, palpitations, dizziness, or leg swelling  GASTROINTESTINAL: No abdominal or epigastric pain. No nausea, vomiting, or hematemesis; No diarrhea or constipation. No melena or hematochezia.  GENITOURINARY: No dysuria, frequency, hematuria, or incontinence  NEUROLOGICAL: No headaches, memory loss, numbness, or tremors; +LE weakness  SKIN: No itching, burning, rashes, or lesions   LYMPH NODES: No enlarged glands  ENDOCRINE: No heat or cold intolerance; No hair loss  MUSCULOSKELETAL: No joint pain or swelling; No muscle, back, or extremity pain  PSYCHIATRIC: No depression, anxiety, mood swings, or difficulty sleeping  HEME/LYMPH: No easy bruising, or bleeding gums  ALLERGY AND IMMUNOLOGIC: No hives or eczema    MEDICATIONS  (STANDING):  amLODIPine   Tablet 5 milliGRAM(s) Oral daily  atorvastatin 40 milliGRAM(s) Oral at bedtime  cefTRIAXone   IVPB      cefTRIAXone   IVPB 1 Gram(s) IV Intermittent once  dextrose 5%. 1000 milliLiter(s) (50 mL/Hr) IV Continuous <Continuous>  heparin  Injectable 5000 Unit(s) SubCutaneous every 12 hours  insulin lispro (HumaLOG) corrective regimen sliding scale   SubCutaneous three times a day before meals    MEDICATIONS  (PRN):  dextrose 40% Gel 15 Gram(s) Oral once PRN Blood Glucose LESS THAN 70 milliGRAM(s)/deciliter  glucagon  Injectable 1 milliGRAM(s) IntraMuscular once PRN Glucose LESS THAN 70 milligrams/deciliter      ALLERGIES: No Known Allergies      FAMILY HISTORY:  No pertinent family history in first degree relatives      PHYSICAL EXAMINATION:  -----------------------------  T(C): 36.6 (09-20-18 @ 12:01), Max: 36.8 (09-20-18 @ 04:06)  HR: 84 (09-20-18 @ 12:01) (77 - 93)  BP: 159/83 (09-20-18 @ 12:01) (129/78 - 169/97)  RR: 17 (09-20-18 @ 12:01) (16 - 18)  SpO2: 97% (09-20-18 @ 12:01) (96% - 100%)  Wt(kg): --    Height (cm): 162.56 (09-19 @ 20:53)  Weight (kg): 89.4 (09-20 @ 06:02)  BMI (kg/m2): 33.8 (09-20 @ 06:02)  BSA (m2): 1.94 (09-20 @ 06:02)    Constitutional: well developed, normal appearance, well groomed, well nourished, no deformities and no acute distress.   Eyes: the conjunctiva exhibited no abnormalities and the eyelids demonstrated no xanthelasmas.   HEENT: normal oral mucosa, no oral pallor and no oral cyanosis.   Neck: normal jugular venous A waves present, normal jugular venous V waves present and no jugular venous arriaza A waves.   Pulmonary: no respiratory distress, normal respiratory rhythm and effort, no accessory muscle use and lungs were clear to auscultation bilaterally.   Cardiovascular: heart rate and rhythm were normal, normal S1 and S2 and no murmur, gallop, rub, heave or thrill are present.   Abdomen: soft, non-tender, no hepato-splenomegaly and no abdominal mass palpated.   Musculoskeletal: the gait could not be assessed..   Extremities: no clubbing of the fingernails, no localized cyanosis, no petechial hemorrhages and no ischemic changes.   Skin: normal skin color and pigmentation, no rash, no venous stasis, no skin lesions, no skin ulcer and no xanthoma was observed.   Psychiatric: oriented to person, place, and time, the affect was normal, the mood was normal and not feeling anxious.     LABS:   --------  09-20    146<H>  |  110<H>  |  6<L>  ----------------------------<  105<H>  3.3<L>   |  28  |  0.63    Ca    8.9      20 Sep 2018 08:48    TPro  7.2  /  Alb  3.4  /  TBili  0.3  /  DBili  x   /  AST  9<L>  /  ALT  28  /  AlkPhos  161<H>  09-20                         13.5   4.27  )-----------( 224      ( 20 Sep 2018 08:48 )             40.9     PT/INR - ( 20 Sep 2018 09:55 )   PT: 12.2 sec;   INR: 1.12 ratio         PTT - ( 20 Sep 2018 09:55 )  PTT:28.7 sec    09-20 @ 08:48 CPK total:--, CKMB --, Troponin I - <.015 ng/mL  09-20 @ 01:47 CPK total:--, CKMB --, Troponin I - <.015 ng/mL

## 2018-09-20 NOTE — H&P ADULT - HISTORY OF PRESENT ILLNESS
72y Female PMH HTN, HLD, DM2, constipation, GERD, chronic back pain,  presents to the ED for syncope.  Says that she had showered and was coming from the bathroom, en route to her bedroom became dizzy, tried to brace herself, but fell and passed out.  Says that the home aide found her and called 911, she was out for about 5 minutes.  Denies any chest pain or shortness of breath.  Complains of headache and neck pain.  Denies use of blood thinners.  Says that her BP has been out of control for the last few days, saw an outside PMD for it yesterday and was given new BP meds.

## 2018-09-20 NOTE — H&P ADULT - NSHPREVIEWOFSYSTEMS_GEN_ALL_CORE
ROS: No fever/chills, No photophobia/eye pain/changes in vision, No ear pain/sore throat/dysphagia, No chest pain/palpitations, no SOB/cough/wheeze/stridor, No abdominal pain, No N/V/D/melena, no dysuria/frequency/discharge, chronic back pain, no rash, no changes in neurological status/function.

## 2018-09-20 NOTE — H&P ADULT - NSHPLABSRESULTS_GEN_ALL_CORE
LABS:                        13.6   4.68  )-----------( 245      ( 19 Sep 2018 23:16 )             41.7     09-20    144  |  108  |  9   ----------------------------<  133<H>  3.3<L>   |  24  |  0.68    Ca    9.0      20 Sep 2018 01:47    TPro  7.2  /  Alb  3.4  /  TBili  0.3  /  DBili  x   /  AST  9<L>  /  ALT  28  /  AlkPhos  161<H>  09-20        CAPILLARY BLOOD GLUCOSE  Troponin I, Serum: <.015:  ng/mL (09.20.18 @ 01:47)              RADIOLOGY & ADDITIONAL TESTS:  < from: CT Cervical Spine No Cont (09.20.18 @ 01:24) >    CT Brain: No skull fracture, hemorrhage or mass effect.    CT Cervical spine: No evidence of acute traumatic injury to the cervical   spine. Degenerative spondylosis. 1.4 cm right thyroid lesion which is new   since 2011 and can be further characterized with nonemergent thyroid   ultrasound.    CXR: enlarged heart      Imaging Personally Reviewed:  [x ] YES  [ ] NO  EKG: sinus@99 no ischemic changes

## 2018-09-20 NOTE — CONSULT NOTE ADULT - ASSESSMENT
72y Female PMH HTN, HLD, DM2, constipation, GERD, chronic back pain,  presents to the ED for syncope.  Says that she had showered and was coming from the bathroom, en route to her bedroom her legs became weak and she was unable to support herself and fell to the floor.  Found by HHA and called 911.  Denies any chest pain or shortness of breath.  Complains of headache and neck pain.  Denies use of blood thinners.  Says that her BP has been out of control for the last few days, saw an outside PMD for it yesterday and was given new BP meds.    Of note, pt with similar admission <1 month ago.  As per ER note:  "Comes from assisted living, says that she has BL leg weakness and gait problems, uses walker to ambulate.  While in the bathroom today patient lost her balance and fell, landing on her buttocks.  Denies head injury or LOC, was on the ground for a few minutes before assisted.  Patient complaining of pain to the lower back radiating to the left leg.  Does not take blood thinners.  Patient does not have saddle numbness or urinary incontinence. CT negative for fracture, shows bulging disk. Patient does not have signs of cord compression.  Feels better this morning.  Discussed need for o/p MRI spine, will provide spine referral".  D/c'd back to assisted living.    Currently: still feels LE weakness and back pain.  EKG unremarkable.  NSR 80s.  Chano neg.  Denied any cardiac sx.    Event likely 2/2 mechanical fall from ?gait imbalance vs neuro deficit.  Unlikely cardiac source.    -monitor on tele; no arrhythmias noted thus far  -2D echo pending  -increase amlodipine to 10mg daily; -160s  -?PT/OT eval  -?MRI spine prior to d/c with hx of multiple falls; (CT scan 8/2018 with lumbar disk bulging)  -replete lytes K>4, Mg>2, Ph>3  -TSH normal  -will sign off for now; please call back with any further questions

## 2018-09-20 NOTE — H&P ADULT - NSHPPOASURGSITEINCISION_GEN_ALL_CORE
Dr Alexis (Pulm) called and notified of patient coughing OG tube out.  Patient on PO meds and MD aware.  Order received to wait on replacement of tube and he will assess on rounding.  Will continue to monitor.   no

## 2018-09-21 LAB
ANION GAP SERPL CALC-SCNC: 8 MMOL/L — SIGNIFICANT CHANGE UP (ref 5–17)
BUN SERPL-MCNC: 12 MG/DL — SIGNIFICANT CHANGE UP (ref 7–23)
CALCIUM SERPL-MCNC: 9.8 MG/DL — SIGNIFICANT CHANGE UP (ref 8.5–10.1)
CHLORIDE SERPL-SCNC: 107 MMOL/L — SIGNIFICANT CHANGE UP (ref 96–108)
CO2 SERPL-SCNC: 29 MMOL/L — SIGNIFICANT CHANGE UP (ref 22–31)
CREAT SERPL-MCNC: 0.75 MG/DL — SIGNIFICANT CHANGE UP (ref 0.5–1.3)
CULTURE RESULTS: SIGNIFICANT CHANGE UP
GLUCOSE BLDC GLUCOMTR-MCNC: 122 MG/DL — HIGH (ref 70–99)
GLUCOSE BLDC GLUCOMTR-MCNC: 122 MG/DL — HIGH (ref 70–99)
GLUCOSE BLDC GLUCOMTR-MCNC: 130 MG/DL — HIGH (ref 70–99)
GLUCOSE BLDC GLUCOMTR-MCNC: 162 MG/DL — HIGH (ref 70–99)
GLUCOSE SERPL-MCNC: 131 MG/DL — HIGH (ref 70–99)
HBA1C BLD-MCNC: 6.4 % — HIGH (ref 4–5.6)
POTASSIUM SERPL-MCNC: 3.3 MMOL/L — LOW (ref 3.5–5.3)
POTASSIUM SERPL-SCNC: 3.3 MMOL/L — LOW (ref 3.5–5.3)
SODIUM SERPL-SCNC: 144 MMOL/L — SIGNIFICANT CHANGE UP (ref 135–145)
SPECIMEN SOURCE: SIGNIFICANT CHANGE UP

## 2018-09-21 PROCEDURE — 99232 SBSQ HOSP IP/OBS MODERATE 35: CPT

## 2018-09-21 RX ORDER — POTASSIUM CHLORIDE 20 MEQ
40 PACKET (EA) ORAL ONCE
Qty: 0 | Refills: 0 | Status: COMPLETED | OUTPATIENT
Start: 2018-09-21 | End: 2018-09-21

## 2018-09-21 RX ORDER — AMLODIPINE BESYLATE 2.5 MG/1
5 TABLET ORAL ONCE
Qty: 0 | Refills: 0 | Status: COMPLETED | OUTPATIENT
Start: 2018-09-21 | End: 2018-09-21

## 2018-09-21 RX ORDER — SODIUM CHLORIDE 9 MG/ML
500 INJECTION, SOLUTION INTRAVENOUS
Qty: 0 | Refills: 0 | Status: DISCONTINUED | OUTPATIENT
Start: 2018-09-21 | End: 2018-09-21

## 2018-09-21 RX ORDER — CIPROFLOXACIN LACTATE 400MG/40ML
250 VIAL (ML) INTRAVENOUS DAILY
Qty: 0 | Refills: 0 | Status: DISCONTINUED | OUTPATIENT
Start: 2018-09-21 | End: 2018-09-21

## 2018-09-21 RX ORDER — CIPROFLOXACIN LACTATE 400MG/40ML
250 VIAL (ML) INTRAVENOUS EVERY 12 HOURS
Qty: 0 | Refills: 0 | Status: COMPLETED | OUTPATIENT
Start: 2018-09-21 | End: 2018-09-24

## 2018-09-21 RX ORDER — AMLODIPINE BESYLATE 2.5 MG/1
5 TABLET ORAL DAILY
Qty: 0 | Refills: 0 | Status: DISCONTINUED | OUTPATIENT
Start: 2018-09-21 | End: 2018-09-21

## 2018-09-21 RX ADMIN — HEPARIN SODIUM 5000 UNIT(S): 5000 INJECTION INTRAVENOUS; SUBCUTANEOUS at 06:13

## 2018-09-21 RX ADMIN — HEPARIN SODIUM 5000 UNIT(S): 5000 INJECTION INTRAVENOUS; SUBCUTANEOUS at 17:06

## 2018-09-21 RX ADMIN — ATORVASTATIN CALCIUM 40 MILLIGRAM(S): 80 TABLET, FILM COATED ORAL at 21:36

## 2018-09-21 RX ADMIN — Medication 1: at 17:07

## 2018-09-21 RX ADMIN — Medication 40 MILLIEQUIVALENT(S): at 17:06

## 2018-09-21 RX ADMIN — AMLODIPINE BESYLATE 5 MILLIGRAM(S): 2.5 TABLET ORAL at 10:45

## 2018-09-21 RX ADMIN — SODIUM CHLORIDE 500 MILLILITER(S): 9 INJECTION, SOLUTION INTRAVENOUS at 11:31

## 2018-09-21 RX ADMIN — Medication 250 MILLIGRAM(S): at 17:06

## 2018-09-21 RX ADMIN — AMLODIPINE BESYLATE 5 MILLIGRAM(S): 2.5 TABLET ORAL at 06:13

## 2018-09-21 NOTE — PHYSICAL THERAPY INITIAL EVALUATION ADULT - IMPAIRMENTS FOUND, PT EVAL
muscle strength/aerobic capacity/endurance/gait, locomotion, and balance bed mobility, transfers/gait, locomotion, and balance/muscle strength/aerobic capacity/endurance

## 2018-09-21 NOTE — PHYSICAL THERAPY INITIAL EVALUATION ADULT - PERTINENT HX OF CURRENT PROBLEM, REHAB EVAL
Pt was admitted for syncopal episode. Says that she showered and was coming from the bathroom, en route to her bedroom became dizzy, tried to brace herself, but fell and passed out.  Says that the home aide found her and called 911, she was out for about 5 minutes.

## 2018-09-21 NOTE — PROGRESS NOTE ADULT - ASSESSMENT
HPI:  72y Female PMH HTN, HLD, DM2, constipation, GERD, chronic back pain,  presents to the ED for syncope. Denied CP, palpitations, sob.   C/o Dysuria.     Evaluated by PT, dispo [facility]    Assessment and Plan:  1. Syncope?, unclear etiology at this time; likely related to UTI and possibly mild dehydration  -Tele without significant events, Chano neg x 2, echo with normal LVEF, TFTs wnl  -no evidence of hypoglycemia, or hypotension   -CT head without sig findings; CT C & L-spine and CTAP all unremarkable     2. UTI, patient complaining of dysuria  -Culture - Urine (09.20.18 @ 23:45) 50,000 - 99,000 CFU/mL Coag Negative Staphylococcus  -will start abx     3. Thyroid nodule, TFTs wnl  -can be worked up as outpatient     4. DM2, without complication, without long-term current use of insulin  -Hemoglobin A1C,  6.4  -ISS  -CHO diet    5. HTN, continue meds    6. Peventative measures, dvt ppx heparin SQ HPI:  72y Female PMH HTN, HLD, DM2, constipation, GERD, chronic back pain,  presents to the ED for syncope. Denied CP, palpitations, sob.   C/o Dysuria.     Evaluated by PT, dispo [facility]    Assessment and Plan:  1. Syncope?, unclear etiology at this time; likely related to UTI and possibly mild dehydration  -Tele without significant events, Chano neg x 2, echo with normal LVEF, TFTs wnl  -no evidence of hypoglycemia, or hypotension   -CT head without sig findings; CT C & L-spine and CTAP all unremarkable     2. UTI, patient complaining of dysuria  -Culture - Urine (09.20.18 @ 23:45) 50,000 - 99,000 CFU/mL Coag Negative Staphylococcus  -will start abx     3. Hypokalemia, repleted  -will monitor    4. Thyroid nodule, TFTs wnl  -can be worked up as outpatient     5. DM2, without complication, without long-term current use of insulin, overcontrolled  -Hemoglobin A1C,  6.4  -ISS  -CHO diet    6. HTN, continue meds    7. Preventative measures, dvt ppx heparin SQ

## 2018-09-21 NOTE — PROGRESS NOTE ADULT - SUBJECTIVE AND OBJECTIVE BOX
Patient is a 72y old  Female who presents with a chief complaint of Syncopal episode. (20 Sep 2018 13:16)        HPI:  72y Female PMH HTN, HLD, DM2, constipation, GERD, chronic back pain,  presents to the ED for syncope.  Says that she had showered and was coming from the bathroom, en route to her bedroom became dizzy, tried to brace herself, but fell and passed out.  Says that the home aide found her and called 911, she was out for about 5 minutes.  Denies any chest pain or shortness of breath.  Complains of headache and neck pain.  Denies use of blood thinners.  Says that her BP has been out of control for the last few days, saw an outside PMD for it yesterday and was given new BP meds. (20 Sep 2018 04:47)      SUBJECTIVE & OBJECTIVE: Pt seen and examined at bedside.     PHYSICAL EXAM:  T(C): 36.8 (18 @ 16:27), Max: 36.8 (18 @ 04:57)  HR: 100 (18 @ 16:27) (79 - 110)  BP: 134/79 (18 @ 16:27) (134/79 - 153/90)  RR: 17 (18 @ 16:27) (16 - 18)  SpO2: 98% (18 @ 16:27) (95% - 98%)  Wt(kg): --   I&O's Detail    20 Sep 2018 07:  -  21 Sep 2018 07:00  --------------------------------------------------------  IN:    Oral Fluid: 420 mL  Total IN: 420 mL    OUT:  Total OUT: 0 mL    Total NET: 420 mL      21 Sep 2018 07:  -  21 Sep 2018 23:35  --------------------------------------------------------  IN:    lactated ringers.: 1000 mL    Oral Fluid: 320 mL  Total IN: 1320 mL    OUT:  Total OUT: 0 mL    Total NET: 1320 mL        GENERAL: NAD, well-groomed, well-developed  HEAD:  Atraumatic, Normocephalic  EYES: EOMI, PERRLA, conjunctiva and sclera clear  ENMT: Moist mucous membranes  NECK: Supple, No JVD  NERVOUS SYSTEM:  Alert & Oriented X3, Motor Strength 5/5 B/L upper and lower extremities; DTRs 2+ intact and symmetric  CHEST/LUNG: Clear to auscultation bilaterally; No rales, rhonchi, wheezing, or rubs  HEART: Regular rate and rhythm; No murmurs, rubs, or gallops  ABDOMEN: Soft, Nontender, Nondistended; Bowel sounds present  EXTREMITIES:  2+ Peripheral Pulses, No clubbing, cyanosis, or edema    MEDICATIONS  (STANDING):  amLODIPine   Tablet 5 milliGRAM(s) Oral daily  atorvastatin 40 milliGRAM(s) Oral at bedtime  ciprofloxacin     Tablet 250 milliGRAM(s) Oral every 12 hours  dextrose 5%. 1000 milliLiter(s) (50 mL/Hr) IV Continuous <Continuous>  dextrose 50% Injectable 12.5 Gram(s) IV Push once  dextrose 50% Injectable 25 Gram(s) IV Push once  dextrose 50% Injectable 25 Gram(s) IV Push once  heparin  Injectable 5000 Unit(s) SubCutaneous every 12 hours  insulin lispro (HumaLOG) corrective regimen sliding scale   SubCutaneous three times a day before meals    MEDICATIONS  (PRN):  dextrose 40% Gel 15 Gram(s) Oral once PRN Blood Glucose LESS THAN 70 milliGRAM(s)/deciliter  glucagon  Injectable 1 milliGRAM(s) IntraMuscular once PRN Glucose LESS THAN 70 milligrams/deciliter      LABS:                        13.5   4.27  )-----------( 224      ( 20 Sep 2018 08:48 )             40.9     09-    144  |  107  |  12  ----------------------------<  131<H>  3.3<L>   |  29  |  0.75    Ca    9.8      21 Sep 2018 11:05    TPro  7.2  /  Alb  3.4  /  TBili  0.3  /  DBili  x   /  AST  9<L>  /  ALT  28  /  AlkPhos  161<H>  09-20    PT/INR - ( 20 Sep 2018 09:55 )   PT: 12.2 sec;   INR: 1.12 ratio         PTT - ( 20 Sep 2018 09:55 )  PTT:28.7 sec  Urinalysis Basic - ( 20 Sep 2018 14:45 )    Color: Yellow / Appearance: Clear / S.015 / pH: x  Gluc: x / Ketone: Negative  / Bili: Negative / Urobili: Negative mg/dL   Blood: x / Protein: Negative mg/dL / Nitrite: Negative   Leuk Esterase: Negative / RBC: Negative /HPF / WBC 0-2   Sq Epi: x / Non Sq Epi: Occasional / Bacteria: Occasional        CAPILLARY BLOOD GLUCOSE      POCT Blood Glucose.: 130 mg/dL (21 Sep 2018 21:38)  POCT Blood Glucose.: 162 mg/dL (21 Sep 2018 17:05)  POCT Blood Glucose.: 122 mg/dL (21 Sep 2018 11:42)  POCT Blood Glucose.: 122 mg/dL (21 Sep 2018 08:11)      CARDIAC MARKERS ( 20 Sep 2018 08:48 )  <.015 ng/mL / x     / x     / x     / x      CARDIAC MARKERS ( 20 Sep 2018 01:47 )  <.015 ng/mL / x     / 189 U/L / x     / 2.5 ng/mL        RECENT CULTURES:  Urine culture:   @ 23:45 --   50,000 - 99,000 CFU/mL Coag Negative Staphylococcus      RADIOLOGY & ADDITIONAL TESTS:  Imaging Personally Reviewed:  [ ] YES  [ ] NO    Consultant(s) Notes Reviewed:  [ ] YES  [ ] NO    Care Discussed with Consultants/Other Providers [ ] YES  [ ] NO HPI:  72y Female PMH HTN, HLD, DM2, constipation, GERD, chronic back pain,  presents to the ED for syncope.  Says that she had showered and was coming from the bathroom, en route to her bedroom became dizzy, tried to brace herself, but fell and passed out.  Says that the home aide found her and called 911, she was out for about 5 minutes.  Denies any chest pain or shortness of breath.  Denies convulsion, tongue biting, incontience, history of seizures. Complains of headache and neck pain.  Denies use of blood thinners.  Says that her BP has been out of control for the last few days, saw an outside PMD for it yesterday and was given new BP meds. (20 Sep 2018 04:47)      SUBJECTIVE & OBJECTIVE:   Pt seen and examined at bedside.   No overnight events.  C/o dysuria.     PHYSICAL EXAM:  T(C): 36.8 (18 @ 16:27), Max: 36.8 (18 @ 04:57)  HR: 100 (18 @ 16:27) (79 - 110)  BP: 134/79 (18 @ 16:27) (134/79 - 153/90)  RR: 17 (18 @ 16:27) (16 - 18)  SpO2: 98% (18 @ 16:27) (95% - 98%)  Wt(kg): --   I&O's Detail    20 Sep 2018 07:  -  21 Sep 2018 07:00  --------------------------------------------------------  IN:    Oral Fluid: 420 mL  Total IN: 420 mL    OUT:  Total OUT: 0 mL    Total NET: 420 mL      21 Sep 2018 07:  -  21 Sep 2018 23:35  --------------------------------------------------------  IN:    lactated ringers.: 1000 mL    Oral Fluid: 320 mL  Total IN: 1320 mL    OUT:  Total OUT: 0 mL    Total NET: 1320 mL        GENERAL: NAD, lying in bed comfortably  HEENT: NCAT, PERRLA, EOMI, MMM  NERVOUS SYSTEM:  Alert & Oriented X3, moving all extremities, no focal deficits  CHEST/LUNG: Clear to auscultation bilaterally; No rales, rhonchi, wheezing, or rubs  HEART: Regular rate and rhythm; No murmurs, rubs, or gallops  ABDOMEN: Soft, +mild suprapubic tenderness, +BS  EXTREMITIES:  2+ Peripheral Pulses, No clubbing, cyanosis, or edema b/l, no calf tenderness b/l     MEDICATIONS  (STANDING):  amLODIPine   Tablet 5 milliGRAM(s) Oral daily  atorvastatin 40 milliGRAM(s) Oral at bedtime  ciprofloxacin     Tablet 250 milliGRAM(s) Oral every 12 hours  dextrose 5%. 1000 milliLiter(s) (50 mL/Hr) IV Continuous <Continuous>  dextrose 50% Injectable 12.5 Gram(s) IV Push once  dextrose 50% Injectable 25 Gram(s) IV Push once  dextrose 50% Injectable 25 Gram(s) IV Push once  heparin  Injectable 5000 Unit(s) SubCutaneous every 12 hours  insulin lispro (HumaLOG) corrective regimen sliding scale   SubCutaneous three times a day before meals    MEDICATIONS  (PRN):  dextrose 40% Gel 15 Gram(s) Oral once PRN Blood Glucose LESS THAN 70 milliGRAM(s)/deciliter  glucagon  Injectable 1 milliGRAM(s) IntraMuscular once PRN Glucose LESS THAN 70 milligrams/deciliter      LABS:                        13.5   4.27  )-----------( 224      ( 20 Sep 2018 08:48 )             40.9     09-    144  |  107  |  12  ----------------------------<  131<H>  3.3<L>   |  29  |  0.75    Ca    9.8      21 Sep 2018 11:05    TPro  7.2  /  Alb  3.4  /  TBili  0.3  /  DBili  x   /  AST  9<L>  /  ALT  28  /  AlkPhos  161<H>  09-20    PT/INR - ( 20 Sep 2018 09:55 )   PT: 12.2 sec;   INR: 1.12 ratio         PTT - ( 20 Sep 2018 09:55 )  PTT:28.7 sec  Urinalysis Basic - ( 20 Sep 2018 14:45 )    Color: Yellow / Appearance: Clear / S.015 / pH: x  Gluc: x / Ketone: Negative  / Bili: Negative / Urobili: Negative mg/dL   Blood: x / Protein: Negative mg/dL / Nitrite: Negative   Leuk Esterase: Negative / RBC: Negative /HPF / WBC 0-2   Sq Epi: x / Non Sq Epi: Occasional / Bacteria: Occasional        CAPILLARY BLOOD GLUCOSE  POCT Blood Glucose.: 130 mg/dL (21 Sep 2018 21:38)  POCT Blood Glucose.: 162 mg/dL (21 Sep 2018 17:05)  POCT Blood Glucose.: 122 mg/dL (21 Sep 2018 11:42)  POCT Blood Glucose.: 122 mg/dL (21 Sep 2018 08:11)      CARDIAC MARKERS ( 20 Sep 2018 08:48 )  <.015 ng/mL / x     / x     / x     / x      CARDIAC MARKERS ( 20 Sep 2018 01:47 )  <.015 ng/mL / x     / 189 U/L / x     / 2.5 ng/mL        RECENT CULTURES:  Urine culture:   @ 23:45 --   50,000 - 99,000 CFU/mL Coag Negative Staphylococcus    < from: CT Head No Cont (18 @ 01:24) >  IMPRESSION:  CT Brain: No skull fracture, hemorrhage or mass effect.    CT Cervical spine: No evidence of acute traumatic injury to the cervical   spine. Degenerative spondylosis. 1.4 cm right thyroid lesion which is new   since  and can be further characterized with nonemergent thyroid   ultrasound.    < end of copied text >    RADIOLOGY & ADDITIONAL TESTS:  Imaging Personally Reviewed:  [ x] YES  [ ] NO    Consultant(s) Notes Reviewed:  [x ] YES  [ ] NO    Care Discussed with Consultants/Other Providers [ x] YES  [ ] NO

## 2018-09-21 NOTE — PHYSICAL THERAPY INITIAL EVALUATION ADULT - ACTIVE RANGE OF MOTION EXAMINATION, REHAB EVAL
Right LE Active ROM was WFL (within functional limits)/no Active ROM deficits were identified/Left LE Active ROM was WFL (within functional limits)

## 2018-09-21 NOTE — PHYSICAL THERAPY INITIAL EVALUATION ADULT - ADDITIONAL COMMENTS
Pt is a 73 yo F who lives in [facility]. As per pt she was independent with all functional mobility and ADL's with a rolling walker prior to admission. Pt states she wears eye glasses. Pt is a poor historian and appears to be confused. PT tried to verify social history from family however, Daughter Genny's ()  number was out of service and son Ethan's number was listed wrong.

## 2018-09-21 NOTE — PHYSICAL THERAPY INITIAL EVALUATION ADULT - CRITERIA FOR SKILLED THERAPEUTIC INTERVENTIONS
predicted duration of therapy intervention/impairments found/Subacute rehab/risk reduction/prevention/rehab potential/therapy frequency/anticipated discharge recommendation

## 2018-09-22 DIAGNOSIS — E87.6 HYPOKALEMIA: ICD-10-CM

## 2018-09-22 DIAGNOSIS — N39.0 URINARY TRACT INFECTION, SITE NOT SPECIFIED: ICD-10-CM

## 2018-09-22 DIAGNOSIS — M79.672 PAIN IN LEFT FOOT: ICD-10-CM

## 2018-09-22 DIAGNOSIS — M25.552 PAIN IN LEFT HIP: ICD-10-CM

## 2018-09-22 LAB
ANION GAP SERPL CALC-SCNC: 9 MMOL/L — SIGNIFICANT CHANGE UP (ref 5–17)
BUN SERPL-MCNC: 14 MG/DL — SIGNIFICANT CHANGE UP (ref 7–23)
CALCIUM SERPL-MCNC: 9.5 MG/DL — SIGNIFICANT CHANGE UP (ref 8.5–10.1)
CHLORIDE SERPL-SCNC: 108 MMOL/L — SIGNIFICANT CHANGE UP (ref 96–108)
CO2 SERPL-SCNC: 26 MMOL/L — SIGNIFICANT CHANGE UP (ref 22–31)
CREAT SERPL-MCNC: 0.82 MG/DL — SIGNIFICANT CHANGE UP (ref 0.5–1.3)
GLUCOSE BLDC GLUCOMTR-MCNC: 111 MG/DL — HIGH (ref 70–99)
GLUCOSE BLDC GLUCOMTR-MCNC: 135 MG/DL — HIGH (ref 70–99)
GLUCOSE BLDC GLUCOMTR-MCNC: 139 MG/DL — HIGH (ref 70–99)
GLUCOSE BLDC GLUCOMTR-MCNC: 147 MG/DL — HIGH (ref 70–99)
GLUCOSE SERPL-MCNC: 106 MG/DL — HIGH (ref 70–99)
MAGNESIUM SERPL-MCNC: 2.5 MG/DL — SIGNIFICANT CHANGE UP (ref 1.6–2.6)
PHOSPHATE SERPL-MCNC: 3.1 MG/DL — SIGNIFICANT CHANGE UP (ref 2.5–4.5)
POTASSIUM SERPL-MCNC: 3.8 MMOL/L — SIGNIFICANT CHANGE UP (ref 3.5–5.3)
POTASSIUM SERPL-SCNC: 3.8 MMOL/L — SIGNIFICANT CHANGE UP (ref 3.5–5.3)
SODIUM SERPL-SCNC: 143 MMOL/L — SIGNIFICANT CHANGE UP (ref 135–145)
TSH SERPL-MCNC: 2.32 UU/ML — SIGNIFICANT CHANGE UP (ref 0.36–3.74)

## 2018-09-22 PROCEDURE — 99232 SBSQ HOSP IP/OBS MODERATE 35: CPT

## 2018-09-22 RX ORDER — PHENAZOPYRIDINE HCL 100 MG
100 TABLET ORAL EVERY 8 HOURS
Qty: 0 | Refills: 0 | Status: DISCONTINUED | OUTPATIENT
Start: 2018-09-22 | End: 2018-09-23

## 2018-09-22 RX ORDER — DOCUSATE SODIUM 100 MG
100 CAPSULE ORAL
Qty: 0 | Refills: 0 | Status: DISCONTINUED | OUTPATIENT
Start: 2018-09-22 | End: 2018-09-25

## 2018-09-22 RX ORDER — ACETAMINOPHEN 500 MG
975 TABLET ORAL EVERY 8 HOURS
Qty: 0 | Refills: 0 | Status: DISCONTINUED | OUTPATIENT
Start: 2018-09-22 | End: 2018-09-25

## 2018-09-22 RX ADMIN — Medication 250 MILLIGRAM(S): at 17:06

## 2018-09-22 RX ADMIN — Medication 250 MILLIGRAM(S): at 05:21

## 2018-09-22 RX ADMIN — HEPARIN SODIUM 5000 UNIT(S): 5000 INJECTION INTRAVENOUS; SUBCUTANEOUS at 17:06

## 2018-09-22 RX ADMIN — HEPARIN SODIUM 5000 UNIT(S): 5000 INJECTION INTRAVENOUS; SUBCUTANEOUS at 05:21

## 2018-09-22 RX ADMIN — AMLODIPINE BESYLATE 5 MILLIGRAM(S): 2.5 TABLET ORAL at 05:21

## 2018-09-22 RX ADMIN — ATORVASTATIN CALCIUM 40 MILLIGRAM(S): 80 TABLET, FILM COATED ORAL at 21:23

## 2018-09-22 RX ADMIN — Medication 100 MILLIGRAM(S): at 22:37

## 2018-09-22 RX ADMIN — Medication 975 MILLIGRAM(S): at 10:27

## 2018-09-22 RX ADMIN — Medication 975 MILLIGRAM(S): at 09:27

## 2018-09-22 NOTE — PROGRESS NOTE ADULT - ASSESSMENT
72y Female PMH HTN, HLD, DM2, constipation, GERD, chronic back pain,  presents to the ED for syncope. Denied CP, palpitations, sob.   C/o Dysuria.     Assessment and Plan:  1. Syncope?, unclear etiology at this time; likely related to UTI and possibly mild dehydration  -Tele without significant events, Chano neg x 2, echo with normal LVEF, TFTs wnl  -no evidence of hypoglycemia, or hypotension   -CT head without sig findings; CT C & L-spine and CTAP all unremarkable     2. UTI, patient complaining of dysuria  -Culture - Urine (09.20.18 @ 23:45) 50,000 - 99,000 CFU/mL Coag Negative Staphylococcus  -continue Ciprofloxacin    3. Pain in Lt hip and foot, will obtain XR     4. Thyroid nodule, TFTs wnl  -can be worked up as outpatient     5. DM2, without complication, without long-term current use of insulin, overcontrolled  -Hemoglobin A1C,  6.4  -ISS  -CHO diet    6. HTN, continue meds    7. Preventative measures, dvt ppx heparin SQ

## 2018-09-22 NOTE — PROGRESS NOTE ADULT - SUBJECTIVE AND OBJECTIVE BOX
HPI:  72y Female PMH HTN, HLD, DM2, constipation, GERD, chronic back pain,  presents to the ED for syncope.  Says that she had showered and was coming from the bathroom, en route to her bedroom became dizzy, tried to brace herself, but fell and passed out.  Says that the home aide found her and called 911, she was out for about 5 minutes.  Denies any chest pain or shortness of breath.  Complains of headache and neck pain.  Denies use of blood thinners.  Says that her BP has been out of control for the last few days, saw an outside PMD for it yesterday and was given new BP meds. (20 Sep 2018 04:47)      SUBJECTIVE & OBJECTIVE:  Pt seen and examined at bedside.   No overnight events.   C/o Left Hip and left foot pain which she believes started after her fall. Denies calf tenderness, SOB, CP, palpitations, LE swelling, dizziness.   Still feels some dysuria but improving.     PHYSICAL EXAM:  T(C): 36.1 (09-22-18 @ 16:35), Max: 36.8 (09-21-18 @ 23:38)  HR: 88 (09-22-18 @ 16:35) (88 - 93)  BP: 129/81 (09-22-18 @ 16:35) (114/69 - 164/84)  RR: 18 (09-22-18 @ 16:35) (16 - 18)  SpO2: 98% (09-22-18 @ 16:35) (97% - 99%)  Wt(kg): --   I&O's Detail    21 Sep 2018 07:01  -  22 Sep 2018 07:00  --------------------------------------------------------  IN:    lactated ringers.: 1000 mL    Oral Fluid: 320 mL  Total IN: 1320 mL    OUT:  Total OUT: 0 mL    Total NET: 1320 mL      22 Sep 2018 07:01  -  22 Sep 2018 21:28  --------------------------------------------------------  IN:    Oral Fluid: 480 mL  Total IN: 480 mL    OUT:  Total OUT: 0 mL    Total NET: 480 mL      GENERAL: NAD, sitting in chair comfortably, smiling and pleasant   HEENT: NCAT, PERRLA, EOMI, MMM  NERVOUS SYSTEM:  Alert & Oriented X3, moving all extremities  CHEST/LUNG: Clear to auscultation bilaterally; No rales, rhonchi, wheezing, or rubs  HEART: RRR, +S1/S2, no m/r/g  ABDOMEN: Soft, +mild suprapubic tenderness, +BS  EXTREMITIES:  2+ Peripheral Pulses, No clubbing, cyanosis, or edema b/l, no calf tenderness b/l     MEDICATIONS  (STANDING):  amLODIPine   Tablet 5 milliGRAM(s) Oral daily  atorvastatin 40 milliGRAM(s) Oral at bedtime  ciprofloxacin     Tablet 250 milliGRAM(s) Oral every 12 hours  dextrose 5%. 1000 milliLiter(s) (50 mL/Hr) IV Continuous <Continuous>  dextrose 50% Injectable 12.5 Gram(s) IV Push once  dextrose 50% Injectable 25 Gram(s) IV Push once  dextrose 50% Injectable 25 Gram(s) IV Push once  heparin  Injectable 5000 Unit(s) SubCutaneous every 12 hours  insulin lispro (HumaLOG) corrective regimen sliding scale   SubCutaneous three times a day before meals    MEDICATIONS  (PRN):  acetaminophen   Tablet .. 975 milliGRAM(s) Oral every 8 hours PRN Moderate Pain (4 - 6)  dextrose 40% Gel 15 Gram(s) Oral once PRN Blood Glucose LESS THAN 70 milliGRAM(s)/deciliter  glucagon  Injectable 1 milliGRAM(s) IntraMuscular once PRN Glucose LESS THAN 70 milligrams/deciliter      LABS:    09-22    143  |  108  |  14  ----------------------------<  106<H>  3.8   |  26  |  0.82    Ca    9.5      22 Sep 2018 06:55  Phos  3.1     09-22  Mg     2.5     09-22          Magnesium, Serum: 2.5 mg/dL (09-22 @ 06:55)    CAPILLARY BLOOD GLUCOSE      POCT Blood Glucose.: 111 mg/dL (22 Sep 2018 21:22)  POCT Blood Glucose.: 135 mg/dL (22 Sep 2018 16:12)  POCT Blood Glucose.: 147 mg/dL (22 Sep 2018 11:21)  POCT Blood Glucose.: 139 mg/dL (22 Sep 2018 07:50)  POCT Blood Glucose.: 130 mg/dL (21 Sep 2018 21:38)    RECENT CULTURES:  Urine culture:  09-20 @ 23:45 --   50,000 - 99,000 CFU/mL Coag Negative Staphylococcus    < from: TTE Echo Doppler w/o Cont (09.20.18 @ 16:39) >  Summary:   1. Left ventricular ejection fraction, by visual estimation, is 55 to   60%.   2. Normal global left ventricular systolic function.   3. Normal left ventricular internal cavity size.   4. There is borderline asymmetric left ventricular hypertrophy.   5. Normal right ventricular size and function.   6. The left atrium is normal in size.   7. Normal right atrial size.   8. The right atrium is normal in size.   9. There is no evidence of pericardial effusion.  10. Degenerative mitral valve.  11. Structurally normal tricuspid valve, with normal leaflet excursion.  12. Trace tricuspid regurgitation.  13. Sclerotic aortic valve with normal opening.  14. Structurally normal pulmonic valve, with normal leaflet excursion.  15. Trace pulmonic valve regurgitation.  16. Pulmonary hypertension is absent.  17. The main pulmonary artery is normal in size.  18. The aortic valve mean gradient is 2.8 mmHg consistent with normally   opening aortic valve.  19. The right atrial pressure is normal.    < end of copied text >    RADIOLOGY & ADDITIONAL TESTS:  Imaging Personally Reviewed:  [x ] YES  [ ] NO    Consultant(s) Notes Reviewed:  [x ] YES  [ ] NO    Care Discussed with Consultants/Other Providers [x ] YES  [ ] NO

## 2018-09-23 DIAGNOSIS — E55.9 VITAMIN D DEFICIENCY, UNSPECIFIED: ICD-10-CM

## 2018-09-23 LAB
24R-OH-CALCIDIOL SERPL-MCNC: 26.8 NG/ML — LOW (ref 30–80)
GLUCOSE BLDC GLUCOMTR-MCNC: 103 MG/DL — HIGH (ref 70–99)
GLUCOSE BLDC GLUCOMTR-MCNC: 110 MG/DL — HIGH (ref 70–99)
GLUCOSE BLDC GLUCOMTR-MCNC: 116 MG/DL — HIGH (ref 70–99)
GLUCOSE BLDC GLUCOMTR-MCNC: 128 MG/DL — HIGH (ref 70–99)

## 2018-09-23 PROCEDURE — 73502 X-RAY EXAM HIP UNI 2-3 VIEWS: CPT | Mod: 26,LT

## 2018-09-23 PROCEDURE — 73620 X-RAY EXAM OF FOOT: CPT | Mod: 26,LT

## 2018-09-23 PROCEDURE — 99232 SBSQ HOSP IP/OBS MODERATE 35: CPT

## 2018-09-23 RX ORDER — CALCIUM CARBONATE 500(1250)
1 TABLET ORAL DAILY
Qty: 0 | Refills: 0 | Status: DISCONTINUED | OUTPATIENT
Start: 2018-09-23 | End: 2018-09-25

## 2018-09-23 RX ORDER — PHENAZOPYRIDINE HCL 100 MG
200 TABLET ORAL THREE TIMES A DAY
Qty: 0 | Refills: 0 | Status: DISCONTINUED | OUTPATIENT
Start: 2018-09-23 | End: 2018-09-24

## 2018-09-23 RX ADMIN — ATORVASTATIN CALCIUM 40 MILLIGRAM(S): 80 TABLET, FILM COATED ORAL at 21:33

## 2018-09-23 RX ADMIN — HEPARIN SODIUM 5000 UNIT(S): 5000 INJECTION INTRAVENOUS; SUBCUTANEOUS at 05:33

## 2018-09-23 RX ADMIN — Medication 100 MILLIGRAM(S): at 05:33

## 2018-09-23 RX ADMIN — Medication 100 MILLIGRAM(S): at 17:19

## 2018-09-23 RX ADMIN — Medication 250 MILLIGRAM(S): at 17:19

## 2018-09-23 RX ADMIN — Medication 100 MILLIGRAM(S): at 13:22

## 2018-09-23 RX ADMIN — AMLODIPINE BESYLATE 5 MILLIGRAM(S): 2.5 TABLET ORAL at 05:33

## 2018-09-23 RX ADMIN — Medication 200 MILLIGRAM(S): at 21:33

## 2018-09-23 RX ADMIN — Medication 1 TABLET(S): at 17:19

## 2018-09-23 RX ADMIN — Medication 250 MILLIGRAM(S): at 05:33

## 2018-09-23 RX ADMIN — HEPARIN SODIUM 5000 UNIT(S): 5000 INJECTION INTRAVENOUS; SUBCUTANEOUS at 17:19

## 2018-09-23 NOTE — PROGRESS NOTE ADULT - PROBLEM SELECTOR PROBLEM 5
Type 2 diabetes mellitus without complication, without long-term current use of insulin Vitamin D insufficiency

## 2018-09-23 NOTE — PROGRESS NOTE ADULT - ASSESSMENT
72y Female PMH HTN, HLD, DM2, constipation, GERD, chronic back pain,  presents to the ED for syncope. Denied CP, palpitations, sob.      Assessment and Plan:  1. Syncope?, unclear etiology at this time; likely related to UTI and possibly mild dehydration  -Tele without significant events, Chano neg x 2, echo with normal LVEF, TFTs wnl  -no evidence of hypoglycemia, or hypotension   -CT head without sig findings; CT C & L-spine and CTAP all unremarkable     2. UTI, patient complaining of dysuria  -Culture - Urine (09.20.18 @ 23:45) 50,000 - 99,000 CFU/mL Coag Negative Staphylococcus  -continue Ciprofloxacin    3. Pain in Lt hip and foot, XR negative for Fx     4. Thyroid nodule, TFTs wnl  -can be worked up as outpatient     5. DM2, without complication, without long-term current use of insulin, overcontrolled  -Hemoglobin A1C,  6.4  -ISS  -CHO diet    6. HTN, continue meds    7. Preventative measures, dvt ppx heparin SQ 72y Female PMH HTN, HLD, DM2, constipation, GERD, chronic back pain,  presents to the ED for syncope. Denied CP, palpitations, sob.      -seen by PT 9/21 --recommended [facility]; to be re-evaluated today  -Son Ethan [phone]    Assessment and Plan:  1. Syncope?, unclear etiology at this time; likely related to UTI and possibly mild dehydration  -Tele without significant events, Chano neg x 2, echo with normal LVEF, TFTs wnl  -no evidence of hypoglycemia, or hypotension   -CT head without sig findings; CT C & L-spine and CTAP all unremarkable     2. UTI, patient complaining of dysuria  -Culture - Urine (09.20.18 @ 23:45) 50,000 - 99,000 CFU/mL Coag Negative Staphylococcus  -continue Ciprofloxacin    3. Pain in Lt hip and foot, XR negative for Fx     4. Thyroid nodule, TFTs wnl  -can be worked up as outpatient     5. DM2, without complication, without long-term current use of insulin, overcontrolled  -Hemoglobin A1C,  6.4  -ISS  -CHO diet    6. HTN, continue meds    7. Preventative measures, dvt ppx heparin SQ 72y Female PMH HTN, HLD, DM2, constipation, GERD, chronic back pain,  presents to the ED for syncope. Denied CP, palpitations, sob.      -seen by PT 9/21 --recommended [facility]; to be re-evaluated today  -Son Ethan [phone]    Assessment and Plan:  1. Syncope?, unclear etiology at this time; likely related to UTI and possibly mild dehydration  -Tele without significant events, Chano neg x 2, echo with normal LVEF, TFTs wnl  -no evidence of hypoglycemia, or hypotension   -CT head without sig findings; CT C & L-spine and CTAP all unremarkable     2. UTI, patient complaining of dysuria  -Culture - Urine (09.20.18 @ 23:45) 50,000 - 99,000 CFU/mL Coag Negative Staphylococcus  -continue Ciprofloxacin    3. Pain in Lt hip and foot, XR negative for Fx     4. Vitamin D insufficiency, start Os-benito suppl    5. Thyroid nodule, TFTs wnl  -can be worked up as outpatient     6. DM2, without complication, without long-term current use of insulin, overcontrolled  -Hemoglobin A1C,  6.4  -ISS  -CHO diet    7. HTN, continue meds    8. Preventative measures, dvt ppx heparin SQ

## 2018-09-23 NOTE — PROGRESS NOTE ADULT - SUBJECTIVE AND OBJECTIVE BOX
HPI:  72y Female PMH HTN, HLD, DM2, constipation, GERD, chronic back pain,  presents to the ED for syncope.  Says that she had showered and was coming from the bathroom, en route to her bedroom became dizzy, tried to brace herself, but fell and passed out.  Says that the home aide found her and called 911, she was out for about 5 minutes.  Denies any chest pain or shortness of breath.  Complains of headache and neck pain.  Denies use of blood thinners.  Says that her BP has been out of control for the last few days, saw an outside PMD for it yesterday and was given new BP meds. (20 Sep 2018 04:47)      SUBJECTIVE & OBJECTIVE:  Pt seen and examined at bedside.   No overnight events.   C/o Left Hip and left foot pain. Still mild dysuria.     PHYSICAL EXAM:  T(C): 36.1 (09-22-18 @ 16:35), Max: 36.8 (09-21-18 @ 23:38)  HR: 88 (09-22-18 @ 16:35) (88 - 93)  BP: 129/81 (09-22-18 @ 16:35) (114/69 - 164/84)  RR: 18 (09-22-18 @ 16:35) (16 - 18)  SpO2: 98% (09-22-18 @ 16:35) (97% - 99%)  Wt(kg): --   I&O's Detail    21 Sep 2018 07:01  -  22 Sep 2018 07:00  --------------------------------------------------------  IN:    lactated ringers.: 1000 mL    Oral Fluid: 320 mL  Total IN: 1320 mL    OUT:  Total OUT: 0 mL    Total NET: 1320 mL      22 Sep 2018 07:01  -  22 Sep 2018 21:28  --------------------------------------------------------  IN:    Oral Fluid: 480 mL  Total IN: 480 mL    OUT:  Total OUT: 0 mL    Total NET: 480 mL      GENERAL: NAD, sitting in chair comfortably, pleasant   HEENT: NCAT, PERRLA, EOMI, MMM  NERVOUS SYSTEM:  Alert & Oriented X3, moving all extremities  CHEST/LUNG: Clear to auscultation bilaterally; No rales, rhonchi, wheezing, or rubs  HEART: RRR, +S1/S2, no m/r/g  ABDOMEN: Soft, +mild suprapubic tenderness, no RUQ tenderness, +BS  EXTREMITIES:  2+ Peripheral Pulses, No clubbing, cyanosis, or edema b/l, no calf tenderness b/l     MEDICATIONS  (STANDING):  amLODIPine   Tablet 5 milliGRAM(s) Oral daily  atorvastatin 40 milliGRAM(s) Oral at bedtime  ciprofloxacin     Tablet 250 milliGRAM(s) Oral every 12 hours  dextrose 5%. 1000 milliLiter(s) (50 mL/Hr) IV Continuous <Continuous>  dextrose 50% Injectable 12.5 Gram(s) IV Push once  dextrose 50% Injectable 25 Gram(s) IV Push once  dextrose 50% Injectable 25 Gram(s) IV Push once  heparin  Injectable 5000 Unit(s) SubCutaneous every 12 hours  insulin lispro (HumaLOG) corrective regimen sliding scale   SubCutaneous three times a day before meals    MEDICATIONS  (PRN):  acetaminophen   Tablet .. 975 milliGRAM(s) Oral every 8 hours PRN Moderate Pain (4 - 6)  dextrose 40% Gel 15 Gram(s) Oral once PRN Blood Glucose LESS THAN 70 milliGRAM(s)/deciliter  glucagon  Injectable 1 milliGRAM(s) IntraMuscular once PRN Glucose LESS THAN 70 milligrams/deciliter      LABS:    09-22    143  |  108  |  14  ----------------------------<  106<H>  3.8   |  26  |  0.82    Ca    9.5      22 Sep 2018 06:55  Phos  3.1     09-22  Mg     2.5     09-22          Magnesium, Serum: 2.5 mg/dL (09-22 @ 06:55)    CAPILLARY BLOOD GLUCOSE      POCT Blood Glucose.: 111 mg/dL (22 Sep 2018 21:22)  POCT Blood Glucose.: 135 mg/dL (22 Sep 2018 16:12)  POCT Blood Glucose.: 147 mg/dL (22 Sep 2018 11:21)  POCT Blood Glucose.: 139 mg/dL (22 Sep 2018 07:50)  POCT Blood Glucose.: 130 mg/dL (21 Sep 2018 21:38)    RECENT CULTURES:  Urine culture:  09-20 @ 23:45 --   50,000 - 99,000 CFU/mL Coag Negative Staphylococcus    < from: TTE Echo Doppler w/o Cont (09.20.18 @ 16:39) >  Summary:   1. Left ventricular ejection fraction, by visual estimation, is 55 to   60%.   2. Normal global left ventricular systolic function.   3. Normal left ventricular internal cavity size.   4. There is borderline asymmetric left ventricular hypertrophy.   5. Normal right ventricular size and function.   6. The left atrium is normal in size.   7. Normal right atrial size.   8. The right atrium is normal in size.   9. There is no evidence of pericardial effusion.  10. Degenerative mitral valve.  11. Structurally normal tricuspid valve, with normal leaflet excursion.  12. Trace tricuspid regurgitation.  13. Sclerotic aortic valve with normal opening.  14. Structurally normal pulmonic valve, with normal leaflet excursion.  15. Trace pulmonic valve regurgitation.  16. Pulmonary hypertension is absent.  17. The main pulmonary artery is normal in size.  18. The aortic valve mean gradient is 2.8 mmHg consistent with normally   opening aortic valve.  19. The right atrial pressure is normal.    < end of copied text >    < from: Xray Foot AP + Lateral, Left (09.23.18 @ 12:59) >  Impression: Diffuse bone demineralization limits evaluation for   subtle/nondisplaced fractures. There is no acute fracture or dislocation.   The joint spaces are maintained. The soft tissues are unremarkable.    < end of copied text >    < from: Xray Hip 2-3 Views, Left (09.23.18 @ 12:59) >  Impression: There is no acute fracture or dislocation. The joint spaces   are maintained. The soft tissues are unremarkable.    < end of copied text >      RADIOLOGY & ADDITIONAL TESTS:  Imaging Personally Reviewed:  [x ] YES  [ ] NO    Consultant(s) Notes Reviewed:  [x ] YES  [ ] NO    Care Discussed with Consultants/Other Providers [x ] YES  [ ] NO

## 2018-09-24 ENCOUNTER — TRANSCRIPTION ENCOUNTER (OUTPATIENT)
Age: 73
End: 2018-09-24

## 2018-09-24 LAB
GLUCOSE BLDC GLUCOMTR-MCNC: 108 MG/DL — HIGH (ref 70–99)
GLUCOSE BLDC GLUCOMTR-MCNC: 111 MG/DL — HIGH (ref 70–99)
GLUCOSE BLDC GLUCOMTR-MCNC: 126 MG/DL — HIGH (ref 70–99)
GLUCOSE BLDC GLUCOMTR-MCNC: 152 MG/DL — HIGH (ref 70–99)

## 2018-09-24 PROCEDURE — 99233 SBSQ HOSP IP/OBS HIGH 50: CPT

## 2018-09-24 RX ADMIN — Medication 1 TABLET(S): at 11:19

## 2018-09-24 RX ADMIN — HEPARIN SODIUM 5000 UNIT(S): 5000 INJECTION INTRAVENOUS; SUBCUTANEOUS at 17:34

## 2018-09-24 RX ADMIN — AMLODIPINE BESYLATE 5 MILLIGRAM(S): 2.5 TABLET ORAL at 05:21

## 2018-09-24 RX ADMIN — Medication 100 MILLIGRAM(S): at 05:21

## 2018-09-24 RX ADMIN — HEPARIN SODIUM 5000 UNIT(S): 5000 INJECTION INTRAVENOUS; SUBCUTANEOUS at 05:21

## 2018-09-24 RX ADMIN — Medication 250 MILLIGRAM(S): at 05:21

## 2018-09-24 RX ADMIN — Medication 1: at 11:19

## 2018-09-24 RX ADMIN — Medication 200 MILLIGRAM(S): at 05:21

## 2018-09-24 RX ADMIN — Medication 100 MILLIGRAM(S): at 17:34

## 2018-09-24 RX ADMIN — ATORVASTATIN CALCIUM 40 MILLIGRAM(S): 80 TABLET, FILM COATED ORAL at 21:18

## 2018-09-24 NOTE — PROGRESS NOTE ADULT - SUBJECTIVE AND OBJECTIVE BOX
Patient is a 72y old  Female who presents with a chief complaint of Syncopal episode. (24 Sep 2018 13:24)      INTERVAL HPI/ OVERNIGHT EVENTS: Pt was seen and examined at bedside today, No significant overnight events, pt denies any complaints.      MEDICATIONS  (STANDING):  amLODIPine   Tablet 5 milliGRAM(s) Oral daily  atorvastatin 40 milliGRAM(s) Oral at bedtime  calcium carbonate   1250 mG (OsCal) 1 Tablet(s) Oral daily  dextrose 5%. 1000 milliLiter(s) (50 mL/Hr) IV Continuous <Continuous>  dextrose 50% Injectable 12.5 Gram(s) IV Push once  dextrose 50% Injectable 25 Gram(s) IV Push once  dextrose 50% Injectable 25 Gram(s) IV Push once  docusate sodium 100 milliGRAM(s) Oral two times a day  heparin  Injectable 5000 Unit(s) SubCutaneous every 12 hours  insulin lispro (HumaLOG) corrective regimen sliding scale   SubCutaneous three times a day before meals    MEDICATIONS  (PRN):  acetaminophen   Tablet .. 975 milliGRAM(s) Oral every 8 hours PRN Moderate Pain (4 - 6)  dextrose 40% Gel 15 Gram(s) Oral once PRN Blood Glucose LESS THAN 70 milliGRAM(s)/deciliter  glucagon  Injectable 1 milliGRAM(s) IntraMuscular once PRN Glucose LESS THAN 70 milligrams/deciliter      Allergies    No Known Allergies    Intolerances        REVIEW OF SYSTEMS:    Unable to examine due to [ ] Encephalopathy [ ] Advanced Dementia [ ] Expressive Aphasia [ ] Non-verbal patient    CONSTITUTIONAL: No fever, NO generalized weakness/Fatigue, No weight loss  EYES: No eye pain, visual disturbances, or discharge  ENMT:  No difficulty hearing, tinnitus, vertigo; No sinus or throat pain  NECK: No pain or stiffness  RESPIRATORY: No shortness of breath,  cough, wheezing, sputum or hemoptysis   CARDIOVASCULAR: No chest pain, palpitations, or leg swelling  GASTROINTESTINAL: No abdominal pain. No nausea, vomiting, diarrhea or constipation. No melena or hematochezia.  GENITOURINARY: No dysuria, frequency, hematuria, or incontinence  NEUROLOGICAL: +unsteady gait, No headaches, Dizziness, memory loss, loss of strength, numbness, or tremors  SKIN: No itching, burning, rashes, or lesions   MUSCULOSKELETAL: No joint pain or swelling; No muscle, back, or extremity pain  PSYCHIATRIC: No depression, anxiety, mood swings, or difficulty sleeping  HEME/LYMPH: No easy bruising, or bleeding gums      Vital Signs Last 24 Hrs  T(C): 36.1 (24 Sep 2018 16:40), Max: 36.5 (24 Sep 2018 05:20)  T(F): 97 (24 Sep 2018 16:40), Max: 97.7 (24 Sep 2018 05:20)  HR: 79 (24 Sep 2018 16:40) (74 - 85)  BP: 138/82 (24 Sep 2018 16:40) (116/72 - 144/69)  BP(mean): --  RR: 17 (24 Sep 2018 16:40) (17 - 18)  SpO2: 96% (24 Sep 2018 16:40) (96% - 99%)    PHYSICAL EXAM:  GENERAL: NAD, well-developed, well-groomed  HEAD:  Atraumatic, Normocephalic  EYES: conjunctiva and sclera clear  ENMT: Moist mucous membranes  NECK: Supple, No JVD, Normal thyroid  CHEST/LUNG: Clear to Auscultation bilaterally; No rales, rhonchi, wheezing, or rubs  HEART: Regular rate and rhythm; No murmurs, rubs, or gallops  ABDOMEN: Soft, Nontender, Nondistended; Bowel sounds present  EXTREMITIES:  2+ Peripheral Pulses, No clubbing, cyanosis, or edema  SKIN: No rashes or lesions  NERVOUS SYSTEM:  Alert & Oriented X3, Good concentration; Motor Strength 5/5 B/L upper and lower extremities    LABS:              CAPILLARY BLOOD GLUCOSE      POCT Blood Glucose.: 111 mg/dL (24 Sep 2018 15:40)  POCT Blood Glucose.: 152 mg/dL (24 Sep 2018 11:17)  POCT Blood Glucose.: 126 mg/dL (24 Sep 2018 07:37)  POCT Blood Glucose.: 116 mg/dL (23 Sep 2018 21:32)        Culture - Urine (collected 09-20-18)  Source: .Urine Clean Catch (Midstream)  Final Report (09-21-18):    50,000 - 99,000 CFU/mL Coag Negative Staphylococcus        RADIOLOGY & ADDITIONAL TESTS:          Imaging Personally Reviewed:  [ ] YES  [ ] NO    Consultant(s) Notes Reviewed:  [ ] YES  [ ] NO    Care Discussed with Consultants/Other Providers [x ] YES  [ ] NO

## 2018-09-24 NOTE — DISCHARGE NOTE ADULT - PLAN OF CARE
Presumed to be Vasovagal Continue to keep yourself well hydrated. Monitor blood pressure.  Rehab on discharge. Continue Norvasc HgA1c 6.4% Continue with metformin and glucose monitoring. s/p fall Analgesics as needed.

## 2018-09-24 NOTE — DISCHARGE NOTE ADULT - OTHER SIGNIFICANT FINDINGS
< from: CT Head No Cont (09.20.18 @ 01:24) >  FINDINGS:  BRAIN:  There is no acute intracranial hemorrhage, hydrocephalus, midline shift,   extra-axial fluid collection or mass effect.     There is mild periventricular white matter lucency which is nonspecific   but likely the sequela of chronic microvascular ischemic change.   Calcifications of the internal carotid and vertebral arteries are noted.    The paranasal sinuses and mastoids are clear. The calvarium is intact.    CERVICAL SPINE:  Vertebral body height and alignment is maintained. The atlantoaxial   relationships are preserved. There is no acute fracture or subluxation.   Old fracture of the left C1 lamina, present in 2011.    There are multilevel degenerative changes of the spine characterized by   intervertebral disc space narrowing, disc osteophytes, uncovertebral and   facet arthropathy causing multilevel spinal canal and neuroforaminal   stenosis, best assessed with MRI. Findings are most pronounced at C4-C5   and C5-C6 which demonstrate moderate spinal canal stenosis.    The prevertebral soft tissues are unremarkable. 1.4 cm right thyroid   lesion, new since 2011. Retropharyngeal course of the carotid arteries, a   normal anatomic variant.    The visualized lungs are clear.     IMPRESSION:  CT Brain: No skull fracture, hemorrhage or mass effect.    CT Cervical spine: No evidence of acute traumatic injury to the cervical   spine. Degenerative spondylosis. 1.4 cm right thyroid lesion which is new   since 2011 and can be further characterized with nonemergent thyroid   ultrasound.    < from: Xray Chest 1 View AP/PA. (09.20.18 @ 01:48) >  The lung fields and pleural surfaces are unremarkable.    The chest is similar to October 3, 2011.    IMPRESSION: Clear lungs.    < from: Xray Hip 2-3 Views, Left (09.23.18 @ 12:59) >  Impression: There is no acute fracture or dislocation. The joint spaces   are maintained. The soft tissues are unremarkable.    < from: Xray Foot AP + Lateral, Left (09.23.18 @ 12:59) >  Impression: Diffuse bone demineralization limits evaluation for   subtle/nondisplaced fractures. There is no acute fracture or dislocation.   The joint spaces are maintained. The soft tissues are unremarkable.    < from: TTE Echo Doppler w/o Cont (09.20.18 @ 16:39) >  Summary:   1. Left ventricular ejection fraction, by visual estimation, is 55 to   60%.   2. Normal global left ventricular systolic function.   3. Normal left ventricular internal cavity size.   4. There is borderline asymmetric left ventricular hypertrophy.   5. Normal right ventricular size and function.   6. The left atrium is normal in size.   7. Normal right atrial size.   8. The right atrium is normal in size.   9. There is no evidence of pericardial effusion.  10. Degenerative mitral valve.  11. Structurally normal tricuspid valve, with normal leaflet excursion.  12. Trace tricuspid regurgitation.  13. Sclerotic aortic valve with normal opening.  14. Structurally normal pulmonic valve, with normal leaflet excursion.  15. Trace pulmonic valve regurgitation.  16. Pulmonary hypertension is absent.  17. The main pulmonary artery is normal in size.  18. The aortic valve mean gradient is 2.8 mmHg consistent with normally   opening aortic valve.  19. The right atrial pressure is normal. CT Head No Cont (09.20.18 @ 01:24)     FINDINGS:  BRAIN:  There is no acute intracranial hemorrhage, hydrocephalus, midline shift,   extra-axial fluid collection or mass effect.     There is mild periventricular white matter lucency which is nonspecific   but likely the sequela of chronic microvascular ischemic change.   Calcifications of the internal carotid and vertebral arteries are noted.    The paranasal sinuses and mastoids are clear. The calvarium is intact.    CERVICAL SPINE:  Vertebral body height and alignment is maintained. The atlantoaxial   relationships are preserved. There is no acute fracture or subluxation.   Old fracture of the left C1 lamina, present in 2011.    There are multilevel degenerative changes of the spine characterized by   intervertebral disc space narrowing, disc osteophytes, uncovertebral and   facet arthropathy causing multilevel spinal canal and neuroforaminal   stenosis, best assessed with MRI. Findings are most pronounced at C4-C5   and C5-C6 which demonstrate moderate spinal canal stenosis.    The prevertebral soft tissues are unremarkable. 1.4 cm right thyroid   lesion, new since 2011. Retropharyngeal course of the carotid arteries, a   normal anatomic variant.    The visualized lungs are clear.     IMPRESSION:  CT Brain: No skull fracture, hemorrhage or mass effect.    CT Cervical spine: No evidence of acute traumatic injury to the cervical   spine. Degenerative spondylosis. 1.4 cm right thyroid lesion which is new   since 2011 and can be further characterized with nonemergent thyroid   ultrasound.    Xray Chest 1 View AP/PA. (09.20.18 @ 01:48) >  The lung fields and pleural surfaces are unremarkable.    The chest is similar to October 3, 2011.    IMPRESSION: Clear lungs.     Xray Hip 2-3 Views, Left (09.23.18 @ 12:59) >  Impression: There is no acute fracture or dislocation. The joint spaces   are maintained. The soft tissues are unremarkable.    Xray Foot AP + Lateral, Left (09.23.18 @ 12:59) >  Impression: Diffuse bone demineralization limits evaluation for   subtle/nondisplaced fractures. There is no acute fracture or dislocation.   The joint spaces are maintained. The soft tissues are unremarkable.     TTE Echo Doppler w/o Cont (09.20.18 @ 16:39) >  Summary:   1. Left ventricular ejection fraction, by visual estimation, is 55 to   60%.   2. Normal global left ventricular systolic function.   3. Normal left ventricular internal cavity size.   4. There is borderline asymmetric left ventricular hypertrophy.   5. Normal right ventricular size and function.   6. The left atrium is normal in size.   7. Normal right atrial size.   8. The right atrium is normal in size.   9. There is no evidence of pericardial effusion.  10. Degenerative mitral valve.  11. Structurally normal tricuspid valve, with normal leaflet excursion.  12. Trace tricuspid regurgitation.  13. Sclerotic aortic valve with normal opening.  14. Structurally normal pulmonic valve, with normal leaflet excursion.  15. Trace pulmonic valve regurgitation.  16. Pulmonary hypertension is absent.  17. The main pulmonary artery is normal in size.  18. The aortic valve mean gradient is 2.8 mmHg consistent with normally   opening aortic valve.  19. The right atrial pressure is normal.

## 2018-09-24 NOTE — DISCHARGE NOTE ADULT - PATIENT PORTAL LINK FT
You can access the [facility] Patient Portal, offered by [facility], by registering with the following website: [email or web address]

## 2018-09-24 NOTE — DISCHARGE NOTE ADULT - HOSPITAL COURSE
72y Female PMH HTN, HLD, DM2, constipation, GERD, chronic back pain,  presents to the ED for syncope.  Says that she had showered and was coming from the bathroom, en route to her bedroom became dizzy, tried to brace herself, but fell and passed out.  Says that the home aide found her and called 911, she was out for about 5 minutes.  Denies any chest pain or shortness of breath.  Complains of headache and neck pain.  Denies use of blood thinners.  Says that her BP has been out of control for the last few days, saw an outside PMD for it yesterday and was given new BP meds. 72y Female PMH HTN, HLD, DM2, constipation, GERD, chronic back pain,  presents to the ED for syncope.  Says that she had showered and was coming from the bathroom, en route to her bedroom became dizzy, tried to brace herself, but fell and passed out.  Says that the home aide found her and called 911, she was out for about 5 minutes.  Denies any chest pain or shortness of breath. She admitted to headache and neck pain. Says that her BP has been out of control for the last few days, saw an outside PMD for it yesterday and was given new BP meds.     ER course: CXR no acute changes, Left Hip and foot Xray showed no acute fracture, Head/Neck CT shows no acute changes. Trop negative, EKG: NSR @ 99, UA benign.     The patient was admitted to telemetry bed. Serial CE negative. Echo was done and was benign. Physical therapy evaluated the patient and recommended rehab.

## 2018-09-24 NOTE — DISCHARGE NOTE ADULT - SECONDARY DIAGNOSIS.
Essential hypertension Type 2 diabetes mellitus with complication, without long-term current use of insulin Hip pain, acute, left

## 2018-09-24 NOTE — DISCHARGE NOTE ADULT - MEDICATION SUMMARY - MEDICATIONS TO STOP TAKING
I will STOP taking the medications listed below when I get home from the hospital:    Pyridium 100 mg oral tablet  -- 2 tab(s) by mouth once a day   -- May discolor urine or feces.  Medication should be taken with plenty of water.  Take with food or milk.    cephalexin 500 mg oral tablet  -- 1 tab(s) by mouth 3 times a day  -- Finish all this medication unless otherwise directed by prescriber.    polyethylene glycol 3350 oral powder for reconstitution  -- 17 gram(s) by mouth once a day   -- Dilute this medication with liquid before administration.  It is very important that you take or use this exactly as directed.  Do not skip doses or discontinue unless directed by your doctor.    ibuprofen 600 mg oral tablet  -- 1 tab(s) by mouth every 6 hours as needed for pain  -- Do not take this drug if you are pregnant.  It is very important that you take or use this exactly as directed.  Do not skip doses or discontinue unless directed by your doctor.  May cause drowsiness or dizziness.  Obtain medical advice before taking any non-prescription drugs as some may affect the action of this medication.  Take with food or milk.

## 2018-09-24 NOTE — DISCHARGE NOTE ADULT - MEDICATION SUMMARY - MEDICATIONS TO TAKE
I will START or STAY ON the medications listed below when I get home from the hospital:    aspirin 81 mg oral delayed release tablet  -- 1 tab(s) by mouth once a day   -- Swallow whole.  Do not crush.  Take with food or milk.    -- Indication: For Preventive measures.     metFORMIN 500 mg oral tablet  -- 1 tab(s) by mouth 2 times a day  -- Indication: For Type 2 diabetes mellitus without complication, without long-term current use of insulin    atorvastatin  --  by mouth once a day (at bedtime)  -- Indication: For Cholesterol/Preventive measures.     Norvasc  -- 5 mg  by mouth once a day  -- Indication: For Essential hypertension    amLODIPine  -- Indication: For Essential hypertension    calcifediol 30 mcg oral capsule, extended release  -- 1 cap(s) by mouth once a day   -- Check with your doctor before becoming pregnant.  Do not chew, break, or crush.    -- Indication: For Supplemental I will START or STAY ON the medications listed below when I get home from the hospital:    aspirin 81 mg oral delayed release tablet  -- 1 tab(s) by mouth once a day   -- Swallow whole.  Do not crush.  Take with food or milk.    -- Indication: For Preventive measures.     metFORMIN 500 mg oral tablet  -- 1 tab(s) by mouth 2 times a day  -- Indication: For Type 2 diabetes mellitus without complication, without long-term current use of insulin    atorvastatin 40 mg oral tablet  -- 1 tab(s) by mouth once a day (at bedtime)  -- Indication: For preventive measures.     Norvasc  -- 5 mg  by mouth once a day  -- Indication: For Essential hypertension    amLODIPine  -- Indication: For Essential hypertension    calcifediol 30 mcg oral capsule, extended release  -- 1 cap(s) by mouth once a day   -- Check with your doctor before becoming pregnant.  Do not chew, break, or crush.    -- Indication: For Supplemental

## 2018-09-24 NOTE — DISCHARGE NOTE ADULT - CARE PLAN
Principal Discharge DX:	Syncope and collapse  Goal:	Presumed to be Vasovagal  Assessment and plan of treatment:	Continue to keep yourself well hydrated. Monitor blood pressure.  Rehab on discharge.  Secondary Diagnosis:	Essential hypertension  Goal:	Continue Norvasc  Secondary Diagnosis:	Type 2 diabetes mellitus with complication, without long-term current use of insulin  Goal:	HgA1c 6.4%  Assessment and plan of treatment:	Continue with metformin and glucose monitoring.  Secondary Diagnosis:	Hip pain, acute, left  Goal:	s/p fall  Assessment and plan of treatment:	Analgesics as needed.

## 2018-09-24 NOTE — PROGRESS NOTE ADULT - ASSESSMENT
72y Female PMH HTN, HLD, DM2, constipation, GERD, chronic back pain,  presents to the ED for syncope. Denied CP, palpitations, sob.      -seen by PT 9/21 --recommended LUCHO; to be re-evaluated today  -Son Ethan [phone]    Assessment and Plan:  Syncope?, unclear etiology at this time; likely related to UTI and possibly mild dehydration  -Tele without significant events, [place] neg x 2, echo with normal LVEF, TFTs wnl  -CT head without sig findings; CT C & L-spine and CTAP all unremarkable  -Fall precautions, STR recommended.      Vitamin D insufficiency, start Os-benito suppl    Thyroid nodule, TFTs wnl  -can be worked up as outpatient     DM2, without complication, without long-term current use of insulin, overcontrolled  -Hemoglobin A1C,  6.4  -ISS  -CHO diet    HTN, continue meds     Preventative measures,  dvt ppx heparin SQ  Disposition: STR

## 2018-09-25 VITALS
DIASTOLIC BLOOD PRESSURE: 78 MMHG | RESPIRATION RATE: 17 BRPM | SYSTOLIC BLOOD PRESSURE: 146 MMHG | TEMPERATURE: 97 F | HEART RATE: 80 BPM | OXYGEN SATURATION: 99 %

## 2018-09-25 LAB
GLUCOSE BLDC GLUCOMTR-MCNC: 115 MG/DL — HIGH (ref 70–99)
GLUCOSE BLDC GLUCOMTR-MCNC: 119 MG/DL — HIGH (ref 70–99)

## 2018-09-25 PROCEDURE — 99239 HOSP IP/OBS DSCHRG MGMT >30: CPT

## 2018-09-25 RX ORDER — ATORVASTATIN CALCIUM 80 MG/1
1 TABLET, FILM COATED ORAL
Qty: 0 | Refills: 0 | DISCHARGE
Start: 2018-09-25

## 2018-09-25 RX ORDER — ASPIRIN/CALCIUM CARB/MAGNESIUM 324 MG
1 TABLET ORAL
Qty: 30 | Refills: 0
Start: 2018-09-25 | End: 2018-10-24

## 2018-09-25 RX ORDER — CALCIFEDIOL 30 UG/1
1 CAPSULE, EXTENDED RELEASE ORAL
Qty: 30 | Refills: 0
Start: 2018-09-25 | End: 2018-10-24

## 2018-09-25 RX ADMIN — Medication 100 MILLIGRAM(S): at 05:23

## 2018-09-25 RX ADMIN — Medication 1 TABLET(S): at 12:40

## 2018-09-25 RX ADMIN — HEPARIN SODIUM 5000 UNIT(S): 5000 INJECTION INTRAVENOUS; SUBCUTANEOUS at 05:23

## 2018-09-25 RX ADMIN — AMLODIPINE BESYLATE 5 MILLIGRAM(S): 2.5 TABLET ORAL at 05:23

## 2018-09-28 DIAGNOSIS — E11.9 TYPE 2 DIABETES MELLITUS WITHOUT COMPLICATIONS: ICD-10-CM

## 2018-09-28 DIAGNOSIS — I07.1 RHEUMATIC TRICUSPID INSUFFICIENCY: ICD-10-CM

## 2018-09-28 DIAGNOSIS — I10 ESSENTIAL (PRIMARY) HYPERTENSION: ICD-10-CM

## 2018-09-28 DIAGNOSIS — G89.29 OTHER CHRONIC PAIN: ICD-10-CM

## 2018-09-28 DIAGNOSIS — E78.5 HYPERLIPIDEMIA, UNSPECIFIED: ICD-10-CM

## 2018-09-28 DIAGNOSIS — E87.6 HYPOKALEMIA: ICD-10-CM

## 2018-09-28 DIAGNOSIS — R55 SYNCOPE AND COLLAPSE: ICD-10-CM

## 2018-09-28 DIAGNOSIS — Z28.21 IMMUNIZATION NOT CARRIED OUT BECAUSE OF PATIENT REFUSAL: ICD-10-CM

## 2018-09-28 DIAGNOSIS — K21.9 GASTRO-ESOPHAGEAL REFLUX DISEASE WITHOUT ESOPHAGITIS: ICD-10-CM

## 2018-09-28 DIAGNOSIS — I27.20 PULMONARY HYPERTENSION, UNSPECIFIED: ICD-10-CM

## 2018-09-28 DIAGNOSIS — N39.0 URINARY TRACT INFECTION, SITE NOT SPECIFIED: ICD-10-CM

## 2018-09-28 DIAGNOSIS — M19.90 UNSPECIFIED OSTEOARTHRITIS, UNSPECIFIED SITE: ICD-10-CM

## 2018-09-28 DIAGNOSIS — E86.0 DEHYDRATION: ICD-10-CM

## 2018-09-28 DIAGNOSIS — M54.9 DORSALGIA, UNSPECIFIED: ICD-10-CM

## 2019-02-24 ENCOUNTER — EMERGENCY (EMERGENCY)
Facility: HOSPITAL | Age: 74
LOS: 1 days | Discharge: HOME | End: 2019-02-24
Attending: EMERGENCY MEDICINE | Admitting: EMERGENCY MEDICINE
Payer: MEDICARE

## 2019-02-24 VITALS
OXYGEN SATURATION: 98 % | DIASTOLIC BLOOD PRESSURE: 92 MMHG | TEMPERATURE: 98 F | RESPIRATION RATE: 18 BRPM | HEART RATE: 85 BPM | SYSTOLIC BLOOD PRESSURE: 154 MMHG

## 2019-02-24 VITALS
DIASTOLIC BLOOD PRESSURE: 72 MMHG | SYSTOLIC BLOOD PRESSURE: 140 MMHG | OXYGEN SATURATION: 98 % | HEART RATE: 70 BPM | RESPIRATION RATE: 16 BRPM

## 2019-02-24 LAB
ALBUMIN SERPL ELPH-MCNC: 4.2 G/DL — SIGNIFICANT CHANGE UP (ref 3.3–5)
ALP SERPL-CCNC: 190 U/L — HIGH (ref 40–120)
ALT FLD-CCNC: 13 U/L — SIGNIFICANT CHANGE UP (ref 4–33)
ANION GAP SERPL CALC-SCNC: 9 MMO/L — SIGNIFICANT CHANGE UP (ref 7–14)
APPEARANCE UR: CLEAR — SIGNIFICANT CHANGE UP
AST SERPL-CCNC: 16 U/L — SIGNIFICANT CHANGE UP (ref 4–32)
BILIRUB SERPL-MCNC: 0.4 MG/DL — SIGNIFICANT CHANGE UP (ref 0.2–1.2)
BILIRUB UR-MCNC: NEGATIVE — SIGNIFICANT CHANGE UP
BLOOD UR QL VISUAL: NEGATIVE — SIGNIFICANT CHANGE UP
BUN SERPL-MCNC: 8 MG/DL — SIGNIFICANT CHANGE UP (ref 7–23)
CALCIUM SERPL-MCNC: 10.2 MG/DL — SIGNIFICANT CHANGE UP (ref 8.4–10.5)
CHLORIDE SERPL-SCNC: 103 MMOL/L — SIGNIFICANT CHANGE UP (ref 98–107)
CO2 SERPL-SCNC: 29 MMOL/L — SIGNIFICANT CHANGE UP (ref 22–31)
COLOR SPEC: SIGNIFICANT CHANGE UP
CREAT SERPL-MCNC: 0.69 MG/DL — SIGNIFICANT CHANGE UP (ref 0.5–1.3)
GLUCOSE SERPL-MCNC: 114 MG/DL — HIGH (ref 70–99)
GLUCOSE UR-MCNC: NEGATIVE — SIGNIFICANT CHANGE UP
HCT VFR BLD CALC: 43 % — SIGNIFICANT CHANGE UP (ref 34.5–45)
HGB BLD-MCNC: 13.5 G/DL — SIGNIFICANT CHANGE UP (ref 11.5–15.5)
KETONES UR-MCNC: NEGATIVE — SIGNIFICANT CHANGE UP
LEUKOCYTE ESTERASE UR-ACNC: NEGATIVE — SIGNIFICANT CHANGE UP
MCHC RBC-ENTMCNC: 24.3 PG — LOW (ref 27–34)
MCHC RBC-ENTMCNC: 31.4 % — LOW (ref 32–36)
MCV RBC AUTO: 77.3 FL — LOW (ref 80–100)
NITRITE UR-MCNC: NEGATIVE — SIGNIFICANT CHANGE UP
NRBC # FLD: 0 K/UL — LOW (ref 25–125)
PH UR: 7 — SIGNIFICANT CHANGE UP (ref 5–8)
PLATELET # BLD AUTO: 208 K/UL — SIGNIFICANT CHANGE UP (ref 150–400)
PMV BLD: 9.8 FL — SIGNIFICANT CHANGE UP (ref 7–13)
POTASSIUM SERPL-MCNC: 3.4 MMOL/L — LOW (ref 3.5–5.3)
POTASSIUM SERPL-SCNC: 3.4 MMOL/L — LOW (ref 3.5–5.3)
PROT SERPL-MCNC: 7.5 G/DL — SIGNIFICANT CHANGE UP (ref 6–8.3)
PROT UR-MCNC: 10 — SIGNIFICANT CHANGE UP
RBC # BLD: 5.56 M/UL — HIGH (ref 3.8–5.2)
RBC # FLD: 15.6 % — HIGH (ref 10.3–14.5)
SODIUM SERPL-SCNC: 141 MMOL/L — SIGNIFICANT CHANGE UP (ref 135–145)
SP GR SPEC: 1.01 — SIGNIFICANT CHANGE UP (ref 1–1.04)
UROBILINOGEN FLD QL: NORMAL — SIGNIFICANT CHANGE UP
WBC # BLD: 3.74 K/UL — LOW (ref 3.8–10.5)
WBC # FLD AUTO: 3.74 K/UL — LOW (ref 3.8–10.5)

## 2019-02-24 PROCEDURE — 99284 EMERGENCY DEPT VISIT MOD MDM: CPT

## 2019-02-24 PROCEDURE — 74177 CT ABD & PELVIS W/CONTRAST: CPT | Mod: 26

## 2019-02-24 RX ORDER — SODIUM CHLORIDE 9 MG/ML
500 INJECTION, SOLUTION INTRAVENOUS ONCE
Qty: 0 | Refills: 0 | Status: COMPLETED | OUTPATIENT
Start: 2019-02-24 | End: 2019-02-24

## 2019-02-24 RX ORDER — ACETAMINOPHEN 500 MG
650 TABLET ORAL ONCE
Qty: 0 | Refills: 0 | Status: COMPLETED | OUTPATIENT
Start: 2019-02-24 | End: 2019-02-24

## 2019-02-24 RX ADMIN — Medication 650 MILLIGRAM(S): at 11:02

## 2019-02-24 RX ADMIN — SODIUM CHLORIDE 500 MILLILITER(S): 9 INJECTION, SOLUTION INTRAVENOUS at 09:53

## 2019-02-24 NOTE — ED PROVIDER NOTE - CONSTITUTIONAL, MLM
normal... Well appearing, well nourished, awake, alert, oriented to person x2.5, place, time/situation and in no apparent distress.

## 2019-02-24 NOTE — ED PROVIDER NOTE - CLINICAL SUMMARY MEDICAL DECISION MAKING FREE TEXT BOX
72 y/o female presenting with headache, suprapubic tenderness and dysuria. Will check urine and blood work and reassess.

## 2019-02-24 NOTE — ED PROVIDER NOTE - NSFOLLOWUPINSTRUCTIONS_ED_ALL_ED_FT
Continue present meds. See PMD for possible treatment of atrophic vaginitis. Return if  pain diarrhea recurs or new or concerning symptoms. Your labs and CT scan and urine analysis were normal.

## 2019-02-24 NOTE — ED PROVIDER NOTE - OBJECTIVE STATEMENT
74 y/o female presents to the ED due for abdominal pain and headache. She states every time she eats she has pain. She reports normal bowel movements. Currently experiencing pain in shoulder. She reports to walk with a walker, has gotten a flu shot, and had a UTI.  Stomach pain upon urination . Denies any constipation, fever or chills. No other acute complaints present at time of eval. 72 y/o female presents to the ED due for abdominal pain and headache. She states every time she eats she experiences abdominal pain. Reports normal bowel movements. Currently experiencing pain in shoulder. Reports walking with walker. Stomach pain upon urination . Denies any constipation, fever or chills. No other acute complaints present at time of eval. 72 y/o female presents to the ED due for abdominal pain, dysuria, frequency of urination, has been treated for a UTI but is not better. Patient has multiple chronic complaints including , shoulder pain, back pain with bilateral radiculopathy,  headache.. Reports walking with walker and wheelchair. Was in rehab for 2 months then at assisted living.  Has chronic constipation but nml BM yesterday and today. Denies fever chills. Ocasional epigastric pain on eating but not now.. Denies fever or chills. No other acute complaints present at time of eval.

## 2019-02-24 NOTE — PROVIDER CONTACT NOTE (OTHER) - ASSESSMENT
SW contacted by Pt's MD Bloch, who states Pt requires ambulance assistance to return to [facility] [phone] [address] [place] [address]. SW contacted [facility] spoke with staff Liana who confirmed address, that Pt is a resident and made aware Pt is returning from ER.  SW contacted [facility] [phone] spoke with Reese who confirmed ambulance by [facility] [phone]. by 6:15pm.  Pt and Medical staff are aware and in agreement with plan. No further SW intervention required at this time. SW contacted by Pt's MD Bloch, who states Pt requires ambulance assistance to return to [facility] [phone] [address] [place] [address]. SW contacted [facility] spoke with staff Liana who confirmed address, that Pt is a resident and made aware Pt is returning from ER.  SW contacted [facility] [phone] spoke with staff Reese who confirmed ambulance by [facility] [phone]. by 6:15pm. Non emergent Ambulance form is completed and in chart.  Pt and Medical staff are aware and in agreement with plan. No further SW intervention required at this time.

## 2019-02-24 NOTE — ED PROVIDER NOTE - PROGRESS NOTE DETAILS
Spoke with nurse at assisted living, patient sent in due to diarrhea and concern about elevated BP, patient's urine negative, likely atrophic vaginitis. Will do CT to be sure no fecal impaction with diarrhea overflow or mass.

## 2019-02-24 NOTE — ED ADULT NURSE NOTE - NSIMPLEMENTINTERV_GEN_ALL_ED
Implemented All Fall Risk Interventions:  [place] to call system. Call bell, personal items and telephone within reach. Instruct patient to call for assistance. Room bathroom lighting operational. Non-slip footwear when patient is off stretcher. Physically safe environment: no spills, clutter or unnecessary equipment. Stretcher in lowest position, wheels locked, appropriate side rails in place. Provide visual cue, wrist band, yellow gown, etc. Monitor gait and stability. Monitor for mental status changes and reorient to person, place, and time. Review medications for side effects contributing to fall risk. Reinforce activity limits and safety measures with patient and family.

## 2019-02-24 NOTE — ED PROVIDER NOTE - GENITOURINARY EXTERNAL GENERAL. FEMALE
ERYTHEMA/vaginal area w/erythema and mild tenderness , no discharge vaginal area w/erythema atrophy and mild tenderness , no discharge/ERYTHEMA

## 2019-02-24 NOTE — ED PROVIDER NOTE - CARE PLAN
Principal Discharge DX:	Abdominal pain  Assessment and plan of treatment:	normal diet, normal meds, f/u for atrophic vaginitis

## 2019-02-24 NOTE — ED ADULT TRIAGE NOTE - CHIEF COMPLAINT QUOTE
p/t c/o of headaches, rt ear pain, abd discomfort and dysuria for 2 days, p/t c/o of constipation as well, denies any nausea or vomiting

## 2019-02-25 LAB — SPECIMEN SOURCE: SIGNIFICANT CHANGE UP

## 2019-02-26 LAB — BACTERIA UR CULT: SIGNIFICANT CHANGE UP

## 2019-03-11 ENCOUNTER — EMERGENCY (EMERGENCY)
Facility: HOSPITAL | Age: 74
LOS: 0 days | Discharge: HOME | End: 2019-03-11
Attending: EMERGENCY MEDICINE
Payer: MEDICARE

## 2019-03-11 ENCOUNTER — INBOUND DOCUMENT (OUTPATIENT)
Age: 74
End: 2019-03-11

## 2019-03-11 VITALS
OXYGEN SATURATION: 98 % | HEART RATE: 84 BPM | RESPIRATION RATE: 18 BRPM | TEMPERATURE: 99 F | DIASTOLIC BLOOD PRESSURE: 85 MMHG | SYSTOLIC BLOOD PRESSURE: 147 MMHG

## 2019-03-11 VITALS
HEART RATE: 106 BPM | RESPIRATION RATE: 19 BRPM | HEIGHT: 66 IN | OXYGEN SATURATION: 97 % | DIASTOLIC BLOOD PRESSURE: 83 MMHG | SYSTOLIC BLOOD PRESSURE: 146 MMHG | TEMPERATURE: 99 F | WEIGHT: 203.05 LBS

## 2019-03-11 DIAGNOSIS — Z79.84 LONG TERM (CURRENT) USE OF ORAL HYPOGLYCEMIC DRUGS: ICD-10-CM

## 2019-03-11 DIAGNOSIS — Z79.899 OTHER LONG TERM (CURRENT) DRUG THERAPY: ICD-10-CM

## 2019-03-11 DIAGNOSIS — E11.9 TYPE 2 DIABETES MELLITUS WITHOUT COMPLICATIONS: ICD-10-CM

## 2019-03-11 DIAGNOSIS — R07.9 CHEST PAIN, UNSPECIFIED: ICD-10-CM

## 2019-03-11 DIAGNOSIS — Z79.82 LONG TERM (CURRENT) USE OF ASPIRIN: ICD-10-CM

## 2019-03-11 DIAGNOSIS — I10 ESSENTIAL (PRIMARY) HYPERTENSION: ICD-10-CM

## 2019-03-11 DIAGNOSIS — E78.5 HYPERLIPIDEMIA, UNSPECIFIED: ICD-10-CM

## 2019-03-11 LAB
ALBUMIN SERPL ELPH-MCNC: 3.6 G/DL — SIGNIFICANT CHANGE UP (ref 3.3–5)
ALP SERPL-CCNC: 190 U/L — HIGH (ref 40–120)
ALT FLD-CCNC: 22 U/L — SIGNIFICANT CHANGE UP (ref 12–78)
ANION GAP SERPL CALC-SCNC: 8 MMOL/L — SIGNIFICANT CHANGE UP (ref 5–17)
APPEARANCE UR: CLEAR — SIGNIFICANT CHANGE UP
APTT BLD: 28.6 SEC — SIGNIFICANT CHANGE UP (ref 27.5–36.3)
AST SERPL-CCNC: 15 U/L — SIGNIFICANT CHANGE UP (ref 15–37)
BILIRUB SERPL-MCNC: 0.3 MG/DL — SIGNIFICANT CHANGE UP (ref 0.2–1.2)
BILIRUB UR-MCNC: NEGATIVE — SIGNIFICANT CHANGE UP
BUN SERPL-MCNC: 11 MG/DL — SIGNIFICANT CHANGE UP (ref 7–23)
CALCIUM SERPL-MCNC: 9.5 MG/DL — SIGNIFICANT CHANGE UP (ref 8.5–10.1)
CHLORIDE SERPL-SCNC: 109 MMOL/L — HIGH (ref 96–108)
CK MB CFR SERPL CALC: 2.5 NG/ML — SIGNIFICANT CHANGE UP (ref 0.5–3.6)
CO2 SERPL-SCNC: 26 MMOL/L — SIGNIFICANT CHANGE UP (ref 22–31)
COLOR SPEC: YELLOW — SIGNIFICANT CHANGE UP
CREAT SERPL-MCNC: 0.74 MG/DL — SIGNIFICANT CHANGE UP (ref 0.5–1.3)
DIFF PNL FLD: ABNORMAL
GLUCOSE SERPL-MCNC: 125 MG/DL — HIGH (ref 70–99)
GLUCOSE UR QL: NEGATIVE MG/DL — SIGNIFICANT CHANGE UP
HCT VFR BLD CALC: 40.9 % — SIGNIFICANT CHANGE UP (ref 34.5–45)
HGB BLD-MCNC: 13 G/DL — SIGNIFICANT CHANGE UP (ref 11.5–15.5)
INR BLD: 1.09 RATIO — SIGNIFICANT CHANGE UP (ref 0.88–1.16)
KETONES UR-MCNC: NEGATIVE — SIGNIFICANT CHANGE UP
LACTATE SERPL-SCNC: 2 MMOL/L — SIGNIFICANT CHANGE UP (ref 0.7–2)
LEUKOCYTE ESTERASE UR-ACNC: ABNORMAL
LIDOCAIN IGE QN: 182 U/L — SIGNIFICANT CHANGE UP (ref 73–393)
MCHC RBC-ENTMCNC: 23.9 PG — LOW (ref 27–34)
MCHC RBC-ENTMCNC: 31.8 GM/DL — LOW (ref 32–36)
MCV RBC AUTO: 75.3 FL — LOW (ref 80–100)
NITRITE UR-MCNC: NEGATIVE — SIGNIFICANT CHANGE UP
NRBC # BLD: 0 /100 WBCS — SIGNIFICANT CHANGE UP (ref 0–0)
PH UR: 5 — SIGNIFICANT CHANGE UP (ref 5–8)
PLATELET # BLD AUTO: 187 K/UL — SIGNIFICANT CHANGE UP (ref 150–400)
POTASSIUM SERPL-MCNC: 3.5 MMOL/L — SIGNIFICANT CHANGE UP (ref 3.5–5.3)
POTASSIUM SERPL-SCNC: 3.5 MMOL/L — SIGNIFICANT CHANGE UP (ref 3.5–5.3)
PROT SERPL-MCNC: 7.2 GM/DL — SIGNIFICANT CHANGE UP (ref 6–8.3)
PROT UR-MCNC: 15 MG/DL
PROTHROM AB SERPL-ACNC: 12.2 SEC — SIGNIFICANT CHANGE UP (ref 10–12.9)
RBC # BLD: 5.43 M/UL — HIGH (ref 3.8–5.2)
RBC # FLD: 15.8 % — HIGH (ref 10.3–14.5)
SODIUM SERPL-SCNC: 143 MMOL/L — SIGNIFICANT CHANGE UP (ref 135–145)
SP GR SPEC: 1.02 — SIGNIFICANT CHANGE UP (ref 1.01–1.02)
TROPONIN I SERPL-MCNC: <.015 NG/ML — SIGNIFICANT CHANGE UP (ref 0.01–0.04)
UROBILINOGEN FLD QL: NEGATIVE MG/DL — SIGNIFICANT CHANGE UP
WBC # BLD: 3.76 K/UL — LOW (ref 3.8–10.5)
WBC # FLD AUTO: 3.76 K/UL — LOW (ref 3.8–10.5)

## 2019-03-11 PROCEDURE — 99285 EMERGENCY DEPT VISIT HI MDM: CPT

## 2019-03-11 PROCEDURE — 71045 X-RAY EXAM CHEST 1 VIEW: CPT | Mod: 26

## 2019-03-11 RX ORDER — MOXIFLOXACIN HYDROCHLORIDE TABLETS, 400 MG 400 MG/1
1 TABLET, FILM COATED ORAL
Qty: 14 | Refills: 0
Start: 2019-03-11 | End: 2019-03-17

## 2019-03-11 RX ORDER — CIPROFLOXACIN LACTATE 400MG/40ML
1 VIAL (ML) INTRAVENOUS
Qty: 14 | Refills: 0 | OUTPATIENT
Start: 2019-03-11 | End: 2019-03-17

## 2019-03-11 RX ORDER — CIPROFLOXACIN LACTATE 400MG/40ML
250 VIAL (ML) INTRAVENOUS ONCE
Qty: 0 | Refills: 0 | Status: COMPLETED | OUTPATIENT
Start: 2019-03-11 | End: 2019-03-11

## 2019-03-11 RX ORDER — SODIUM CHLORIDE 9 MG/ML
1000 INJECTION INTRAMUSCULAR; INTRAVENOUS; SUBCUTANEOUS ONCE
Qty: 0 | Refills: 0 | Status: COMPLETED | OUTPATIENT
Start: 2019-03-11 | End: 2019-03-11

## 2019-03-11 RX ADMIN — Medication 250 MILLIGRAM(S): at 15:06

## 2019-03-11 RX ADMIN — SODIUM CHLORIDE 1000 MILLILITER(S): 9 INJECTION INTRAMUSCULAR; INTRAVENOUS; SUBCUTANEOUS at 13:24

## 2019-03-11 RX ADMIN — SODIUM CHLORIDE 1000 MILLILITER(S): 9 INJECTION INTRAMUSCULAR; INTRAVENOUS; SUBCUTANEOUS at 12:55

## 2019-03-11 NOTE — ED ADULT TRIAGE NOTE - CHIEF COMPLAINT QUOTE
patient BIBA from [facility] patient c/o of chest pain radiating in the L shoulder , c/o of abdominal pain and dysuria , denied difficulty breathing

## 2019-03-11 NOTE — ED PROVIDER NOTE - PROGRESS NOTE DETAILS
Results reported to patient--grossly benign, UA shows UTI, remainder of albs unremarkable   Pt. reports feeling better after meds, cp resolved  pt. agrees to f/u with primary care outpt., will refer to cardio for f/u  pt. understands to return to ED if symptoms worsen; will d/c with cipro

## 2019-03-11 NOTE — ED ADULT NURSE NOTE - CHIEF COMPLAINT QUOTE
patient BIBA from [facility] patient c/o of chest pain radiating in the L shoulder , c/o of abdominal pain and dysuria , denied difficulty breathing

## 2019-03-11 NOTE — ED PROVIDER NOTE - PHYSICAL EXAMINATION
Vitals: tachy at 106, otherwise WNL  Gen: AAOx3, NAD, sitting comfortably in stretcher  Head: ncat, perrla, eomi b/l  Neck: supple, no lymphadenopathy, no midline deviation  Heart: rrr, no m/r/g  Lungs: CTA b/l, no rales/ronchi/wheezes  Abd: soft, nontender, non-distended, no rebound or guarding  Ext: no clubbing/cyanosis/edema  Neuro: sensation and muscle strength intact b/l, steady gait

## 2019-03-11 NOTE — ED PROVIDER NOTE - CLINICAL SUMMARY MEDICAL DECISION MAKING FREE TEXT BOX
74 yo F with urinary complaints, likely UTI, doubt ACS, pancreatitis, ischemia (referred pain to L shld)  -basic labs, lipase, lactate, trop, ckmb, ua, cx, cxr, ekg, monitor, iv line, ns hydration  -f/u results, reeval

## 2019-03-11 NOTE — ED PROVIDER NOTE - OBJECTIVE STATEMENT
74 yo F with dysuria.  Pt. complains of L shoulder pain that radiates to L upper chest, but denies central chest pain or squeezing sensation.  No other complaints or inciting event.  Pt. feels well otherwise.   ROS: negative for fever, cough, headache, chest pain, shortness of breath, abd pain, nausea, vomiting, diarrhea, rash, paresthesia, and weakness--all other systems reviewed are negative.   PMH: arthritis, DM, HTN, HLD; Meds: See EMR; SH: Denies smoking/drinking/drug use

## 2019-03-11 NOTE — ED PROVIDER NOTE - CARE PROVIDERS DIRECT ADDRESSES
[email or web address]

## 2019-03-11 NOTE — ED PROVIDER NOTE - CARE PROVIDER_API CALL
Aniket Gardner)  Cardiology; Cardiovascular Disease; Nuclear Cardiology  [address] [place] [address]  Phone: [phone]  Fax: [phone]  Follow Up Time: 4-6 Days

## 2019-03-11 NOTE — ED ADULT NURSE NOTE - OBJECTIVE STATEMENT
Patient to the ED with complaint of upper epigastric pain radiating to upper R quad, patient reported that it is worst on urination and associated with burning and pain on urination.  patient reported that she has had the pain for many months, had a CT scan 1 week ago.  Patient denies chest pain, fever chills or any other symptoms.

## 2019-03-12 LAB
CULTURE RESULTS: SIGNIFICANT CHANGE UP
SPECIMEN SOURCE: SIGNIFICANT CHANGE UP

## 2019-04-02 ENCOUNTER — EMERGENCY (EMERGENCY)
Facility: HOSPITAL | Age: 74
LOS: 1 days | Discharge: HOME | End: 2019-04-02
Attending: EMERGENCY MEDICINE | Admitting: EMERGENCY MEDICINE
Payer: MEDICARE

## 2019-04-02 VITALS
DIASTOLIC BLOOD PRESSURE: 89 MMHG | OXYGEN SATURATION: 100 % | TEMPERATURE: 98 F | RESPIRATION RATE: 16 BRPM | SYSTOLIC BLOOD PRESSURE: 167 MMHG | HEART RATE: 83 BPM

## 2019-04-02 VITALS
SYSTOLIC BLOOD PRESSURE: 152 MMHG | HEART RATE: 93 BPM | OXYGEN SATURATION: 99 % | DIASTOLIC BLOOD PRESSURE: 64 MMHG | TEMPERATURE: 98 F | RESPIRATION RATE: 18 BRPM

## 2019-04-02 LAB
ALBUMIN SERPL ELPH-MCNC: 4.3 G/DL — SIGNIFICANT CHANGE UP (ref 3.3–5)
ALP SERPL-CCNC: 185 U/L — HIGH (ref 40–120)
ALT FLD-CCNC: 17 U/L — SIGNIFICANT CHANGE UP (ref 4–33)
ANION GAP SERPL CALC-SCNC: 11 MMO/L — SIGNIFICANT CHANGE UP (ref 7–14)
APPEARANCE UR: CLEAR — SIGNIFICANT CHANGE UP
AST SERPL-CCNC: 14 U/L — SIGNIFICANT CHANGE UP (ref 4–32)
BASOPHILS # BLD AUTO: 0.03 K/UL — SIGNIFICANT CHANGE UP (ref 0–0.2)
BASOPHILS NFR BLD AUTO: 0.8 % — SIGNIFICANT CHANGE UP (ref 0–2)
BILIRUB SERPL-MCNC: 0.5 MG/DL — SIGNIFICANT CHANGE UP (ref 0.2–1.2)
BILIRUB UR-MCNC: NEGATIVE — SIGNIFICANT CHANGE UP
BLOOD UR QL VISUAL: NEGATIVE — SIGNIFICANT CHANGE UP
BUN SERPL-MCNC: 8 MG/DL — SIGNIFICANT CHANGE UP (ref 7–23)
CALCIUM SERPL-MCNC: 9.9 MG/DL — SIGNIFICANT CHANGE UP (ref 8.4–10.5)
CHLORIDE SERPL-SCNC: 104 MMOL/L — SIGNIFICANT CHANGE UP (ref 98–107)
CO2 SERPL-SCNC: 27 MMOL/L — SIGNIFICANT CHANGE UP (ref 22–31)
COLOR SPEC: SIGNIFICANT CHANGE UP
CREAT SERPL-MCNC: 0.68 MG/DL — SIGNIFICANT CHANGE UP (ref 0.5–1.3)
EOSINOPHIL # BLD AUTO: 0.02 K/UL — SIGNIFICANT CHANGE UP (ref 0–0.5)
EOSINOPHIL NFR BLD AUTO: 0.5 % — SIGNIFICANT CHANGE UP (ref 0–6)
GLUCOSE SERPL-MCNC: 137 MG/DL — HIGH (ref 70–99)
GLUCOSE UR-MCNC: NEGATIVE — SIGNIFICANT CHANGE UP
HCT VFR BLD CALC: 40.4 % — SIGNIFICANT CHANGE UP (ref 34.5–45)
HGB BLD-MCNC: 13.1 G/DL — SIGNIFICANT CHANGE UP (ref 11.5–15.5)
IMM GRANULOCYTES NFR BLD AUTO: 0.3 % — SIGNIFICANT CHANGE UP (ref 0–1.5)
KETONES UR-MCNC: NEGATIVE — SIGNIFICANT CHANGE UP
LEUKOCYTE ESTERASE UR-ACNC: NEGATIVE — SIGNIFICANT CHANGE UP
LIDOCAIN IGE QN: 37 U/L — SIGNIFICANT CHANGE UP (ref 7–60)
LYMPHOCYTES # BLD AUTO: 1.6 K/UL — SIGNIFICANT CHANGE UP (ref 1–3.3)
LYMPHOCYTES # BLD AUTO: 40.6 % — SIGNIFICANT CHANGE UP (ref 13–44)
MAGNESIUM SERPL-MCNC: 2.2 MG/DL — SIGNIFICANT CHANGE UP (ref 1.6–2.6)
MCHC RBC-ENTMCNC: 24.1 PG — LOW (ref 27–34)
MCHC RBC-ENTMCNC: 32.4 % — SIGNIFICANT CHANGE UP (ref 32–36)
MCV RBC AUTO: 74.4 FL — LOW (ref 80–100)
MONOCYTES # BLD AUTO: 0.2 K/UL — SIGNIFICANT CHANGE UP (ref 0–0.9)
MONOCYTES NFR BLD AUTO: 5.1 % — SIGNIFICANT CHANGE UP (ref 2–14)
NEUTROPHILS # BLD AUTO: 2.08 K/UL — SIGNIFICANT CHANGE UP (ref 1.8–7.4)
NEUTROPHILS NFR BLD AUTO: 52.7 % — SIGNIFICANT CHANGE UP (ref 43–77)
NITRITE UR-MCNC: NEGATIVE — SIGNIFICANT CHANGE UP
NRBC # FLD: 0 K/UL — SIGNIFICANT CHANGE UP (ref 0–0)
PH UR: 6.5 — SIGNIFICANT CHANGE UP (ref 5–8)
PHOSPHATE SERPL-MCNC: 2.5 MG/DL — SIGNIFICANT CHANGE UP (ref 2.5–4.5)
PLATELET # BLD AUTO: 198 K/UL — SIGNIFICANT CHANGE UP (ref 150–400)
PMV BLD: 10.4 FL — SIGNIFICANT CHANGE UP (ref 7–13)
POTASSIUM SERPL-MCNC: 3.3 MMOL/L — LOW (ref 3.5–5.3)
POTASSIUM SERPL-SCNC: 3.3 MMOL/L — LOW (ref 3.5–5.3)
PROT SERPL-MCNC: 7.2 G/DL — SIGNIFICANT CHANGE UP (ref 6–8.3)
PROT UR-MCNC: NEGATIVE — SIGNIFICANT CHANGE UP
RBC # BLD: 5.43 M/UL — HIGH (ref 3.8–5.2)
RBC # FLD: 15.2 % — HIGH (ref 10.3–14.5)
SODIUM SERPL-SCNC: 142 MMOL/L — SIGNIFICANT CHANGE UP (ref 135–145)
SP GR SPEC: 1.02 — SIGNIFICANT CHANGE UP (ref 1–1.04)
UROBILINOGEN FLD QL: NORMAL — SIGNIFICANT CHANGE UP
WBC # BLD: 3.94 K/UL — SIGNIFICANT CHANGE UP (ref 3.8–10.5)
WBC # FLD AUTO: 3.94 K/UL — SIGNIFICANT CHANGE UP (ref 3.8–10.5)

## 2019-04-02 PROCEDURE — 99284 EMERGENCY DEPT VISIT MOD MDM: CPT | Mod: GC

## 2019-04-02 PROCEDURE — 71046 X-RAY EXAM CHEST 2 VIEWS: CPT | Mod: 26

## 2019-04-02 RX ORDER — PHENAZOPYRIDINE HCL 100 MG
100 TABLET ORAL ONCE
Qty: 0 | Refills: 0 | Status: COMPLETED | OUTPATIENT
Start: 2019-04-02 | End: 2019-04-02

## 2019-04-02 RX ORDER — ACETAMINOPHEN 500 MG
975 TABLET ORAL ONCE
Qty: 0 | Refills: 0 | Status: COMPLETED | OUTPATIENT
Start: 2019-04-02 | End: 2019-04-02

## 2019-04-02 RX ORDER — SODIUM CHLORIDE 9 MG/ML
1000 INJECTION, SOLUTION INTRAVENOUS ONCE
Qty: 0 | Refills: 0 | Status: COMPLETED | OUTPATIENT
Start: 2019-04-02 | End: 2019-04-02

## 2019-04-02 RX ORDER — POTASSIUM CHLORIDE 20 MEQ
40 PACKET (EA) ORAL ONCE
Qty: 0 | Refills: 0 | Status: COMPLETED | OUTPATIENT
Start: 2019-04-02 | End: 2019-04-02

## 2019-04-02 RX ADMIN — Medication 100 MILLIGRAM(S): at 11:40

## 2019-04-02 RX ADMIN — Medication 40 MILLIEQUIVALENT(S): at 11:40

## 2019-04-02 RX ADMIN — SODIUM CHLORIDE 1000 MILLILITER(S): 9 INJECTION, SOLUTION INTRAVENOUS at 10:30

## 2019-04-02 RX ADMIN — Medication 975 MILLIGRAM(S): at 10:58

## 2019-04-02 RX ADMIN — Medication 975 MILLIGRAM(S): at 11:49

## 2019-04-02 NOTE — ED PROVIDER NOTE - NSFOLLOWUPINSTRUCTIONS_ED_ALL_ED_FT
You were evaluated in the Emergency room for lower abdominal pain and painful urination. At this time it does not appear that you have an acute infection, however a urine culture was sent and if you require an antibiotic you will be contacted. Take acetaminophen 650mg every 4-6 hours as needed for pain. Return to the ER for any new or worsening symptoms.

## 2019-04-02 NOTE — ED ADULT NURSE NOTE - OBJECTIVE STATEMENT
Pt presents to ED complaining of abdominal pain, frequent urination, and burning during urination x 2 months. Pt states she was recently in the hospital and they gave her medication but it did not help. Pt is coming from assisted living and ambulates with a walker at the assisted living facility. Pt is AxOx3. Pt denies chest pain, lightheadedness and dizziness. Pt denies shortness of breath. Breathing even and unlabored. Pt abdomen soft, nontender but distended. Pt skin clean dry and intact. 20G IVL placed in the L AC. Will continue to monitor.

## 2019-04-02 NOTE — ED PROVIDER NOTE - OBJECTIVE STATEMENT
73yof w/ HTN, HLD, DM2 has been having suprapubic abdominal pain and burning urination for several weeks, was seen at [facility] for the same 3 weeks ago and was treated w/ cipro for presumed UTI but urine culture grew likely contaminants. Symptoms never improved. Also reports low back pain and intermittent dull aching headaches. No reported fevers or chills. No chest pain, shortness of breath, cough, chills, congestion.

## 2019-04-02 NOTE — ED ADULT TRIAGE NOTE - CHIEF COMPLAINT QUOTE
Patient brought to Er from [facility] by EMS for body aches. Patient brought to Er from [facility] by EMS for body aches, abdominal pain, burning when urinating and headaches.

## 2019-04-02 NOTE — ED ADULT NURSE NOTE - NSIMPLEMENTINTERV_GEN_ALL_ED
Implemented All Fall Risk Interventions:  [place] to call system. Call bell, personal items and telephone within reach. Instruct patient to call for assistance. Room bathroom lighting operational. Non-slip footwear when patient is off stretcher. Physically safe environment: no spills, clutter or unnecessary equipment. Stretcher in lowest position, wheels locked, appropriate side rails in place. Provide visual cue, wrist band, yellow gown, etc. Monitor gait and stability. Monitor for mental status changes and reorient to person, place, and time. Review medications for side effects contributing to fall risk. Reinforce activity limits and safety measures with patient and family.

## 2019-04-02 NOTE — PROVIDER CONTACT NOTE (OTHER) - ASSESSMENT
Met with patient at bedside in ED. Patient in alert and oriented x 4.   Patient medically ready for return to [facility] ([phone]). Spoke with facility, patient accepted for return.  [facility] ([phone]) arranged to transport patient at 330pm, trip #572F.  Non-emergency ambulance form completed and placed on chart.   Patient and facility aware and in agreement with plan.

## 2019-04-02 NOTE — ED PROVIDER NOTE - PROGRESS NOTE DETAILS
Leroy: Noted mild hypokalemia, repleted orally. UA without signs of infection but will have to follow up culture results. No need for antibiotics now. Pain improved. WIll dc back to assisted living.

## 2019-04-02 NOTE — ED ADULT NURSE NOTE - CHIEF COMPLAINT QUOTE
Patient brought to Er from [facility] by EMS for body aches, abdominal pain, burning when urinating and headaches.

## 2019-04-03 LAB
BACTERIA UR CULT: SIGNIFICANT CHANGE UP
SPECIMEN SOURCE: SIGNIFICANT CHANGE UP

## 2020-01-21 NOTE — DISCHARGE NOTE ADULT - PROVIDER TOKENS
Hpi Title: Evaluation of Skin Lesions How Severe Are Your Spot(S)?: moderate Have Your Spot(S) Been Treated In The Past?: has not been treated FREE:[LAST:[PCP],PHONE:[(   )    -],FAX:[(   )    -]]

## 2020-03-28 ENCOUNTER — INPATIENT (INPATIENT)
Facility: HOSPITAL | Age: 75
LOS: 5 days | Discharge: NURSING, REHAB OR LONG-TERM CARE | End: 2020-04-03
Attending: HOSPITALIST | Admitting: HOSPITALIST
Payer: MEDICARE

## 2020-03-28 VITALS
DIASTOLIC BLOOD PRESSURE: 76 MMHG | OXYGEN SATURATION: 97 % | SYSTOLIC BLOOD PRESSURE: 149 MMHG | TEMPERATURE: 99 F | RESPIRATION RATE: 16 BRPM | HEART RATE: 87 BPM

## 2020-03-28 LAB
ALBUMIN SERPL ELPH-MCNC: 4.3 G/DL — SIGNIFICANT CHANGE UP (ref 3.3–5)
ALP SERPL-CCNC: 195 U/L — HIGH (ref 40–120)
ALT FLD-CCNC: 30 U/L — SIGNIFICANT CHANGE UP (ref 4–33)
ANION GAP SERPL CALC-SCNC: 14 MMO/L — SIGNIFICANT CHANGE UP (ref 7–14)
APTT BLD: 33 SEC — SIGNIFICANT CHANGE UP (ref 27.5–36.3)
AST SERPL-CCNC: 32 U/L — SIGNIFICANT CHANGE UP (ref 4–32)
BASOPHILS # BLD AUTO: 0.04 K/UL — SIGNIFICANT CHANGE UP (ref 0–0.2)
BASOPHILS NFR BLD AUTO: 0.9 % — SIGNIFICANT CHANGE UP (ref 0–2)
BILIRUB SERPL-MCNC: 0.2 MG/DL — SIGNIFICANT CHANGE UP (ref 0.2–1.2)
BUN SERPL-MCNC: 9 MG/DL — SIGNIFICANT CHANGE UP (ref 7–23)
CALCIUM SERPL-MCNC: 9.4 MG/DL — SIGNIFICANT CHANGE UP (ref 8.4–10.5)
CHLORIDE SERPL-SCNC: 104 MMOL/L — SIGNIFICANT CHANGE UP (ref 98–107)
CO2 SERPL-SCNC: 22 MMOL/L — SIGNIFICANT CHANGE UP (ref 22–31)
CREAT SERPL-MCNC: 0.57 MG/DL — SIGNIFICANT CHANGE UP (ref 0.5–1.3)
EOSINOPHIL # BLD AUTO: 0 K/UL — SIGNIFICANT CHANGE UP (ref 0–0.5)
EOSINOPHIL NFR BLD AUTO: 0 % — SIGNIFICANT CHANGE UP (ref 0–6)
GLUCOSE SERPL-MCNC: 138 MG/DL — HIGH (ref 70–99)
HCT VFR BLD CALC: 40.1 % — SIGNIFICANT CHANGE UP (ref 34.5–45)
HGB BLD-MCNC: 12.8 G/DL — SIGNIFICANT CHANGE UP (ref 11.5–15.5)
IMM GRANULOCYTES NFR BLD AUTO: 0.5 % — SIGNIFICANT CHANGE UP (ref 0–1.5)
INR BLD: 1.12 — SIGNIFICANT CHANGE UP (ref 0.88–1.17)
LYMPHOCYTES # BLD AUTO: 1.27 K/UL — SIGNIFICANT CHANGE UP (ref 1–3.3)
LYMPHOCYTES # BLD AUTO: 29.3 % — SIGNIFICANT CHANGE UP (ref 13–44)
MCHC RBC-ENTMCNC: 24.1 PG — LOW (ref 27–34)
MCHC RBC-ENTMCNC: 31.9 % — LOW (ref 32–36)
MCV RBC AUTO: 75.5 FL — LOW (ref 80–100)
MONOCYTES # BLD AUTO: 0.52 K/UL — SIGNIFICANT CHANGE UP (ref 0–0.9)
MONOCYTES NFR BLD AUTO: 12 % — SIGNIFICANT CHANGE UP (ref 2–14)
NEUTROPHILS # BLD AUTO: 2.48 K/UL — SIGNIFICANT CHANGE UP (ref 1.8–7.4)
NEUTROPHILS NFR BLD AUTO: 57.3 % — SIGNIFICANT CHANGE UP (ref 43–77)
NRBC # FLD: 0 K/UL — SIGNIFICANT CHANGE UP (ref 0–0)
PLATELET # BLD AUTO: 189 K/UL — SIGNIFICANT CHANGE UP (ref 150–400)
PMV BLD: 9.9 FL — SIGNIFICANT CHANGE UP (ref 7–13)
POTASSIUM SERPL-MCNC: 3.6 MMOL/L — SIGNIFICANT CHANGE UP (ref 3.5–5.3)
POTASSIUM SERPL-SCNC: 3.6 MMOL/L — SIGNIFICANT CHANGE UP (ref 3.5–5.3)
PROT SERPL-MCNC: 7.9 G/DL — SIGNIFICANT CHANGE UP (ref 6–8.3)
PROTHROM AB SERPL-ACNC: 12.9 SEC — SIGNIFICANT CHANGE UP (ref 9.8–13.1)
RBC # BLD: 5.31 M/UL — HIGH (ref 3.8–5.2)
RBC # FLD: 15 % — HIGH (ref 10.3–14.5)
SODIUM SERPL-SCNC: 140 MMOL/L — SIGNIFICANT CHANGE UP (ref 135–145)
WBC # BLD: 4.33 K/UL — SIGNIFICANT CHANGE UP (ref 3.8–10.5)
WBC # FLD AUTO: 4.33 K/UL — SIGNIFICANT CHANGE UP (ref 3.8–10.5)

## 2020-03-28 PROCEDURE — 70450 CT HEAD/BRAIN W/O DYE: CPT | Mod: 26

## 2020-03-28 NOTE — ED PROVIDER NOTE - ATTENDING CONTRIBUTION TO CARE
Dr. Albarran:  I have personally performed a face to face bedside history and physical examination of this patient. I have discussed the history, examination, review of systems, assessment and plan of management with the resident. I have reviewed the electronic medical record and amended it to reflect my history, review of systems, physical exam, assessment and plan.    74F h/o DVT, htn, hld, presents after fall and c/o pain to bilateral knees.  Denies head trauma/LOC.  States she fell because she felt dizzy and had a headache, unable to give further details regarding those symptoms.    Exam:  - nad  - rrr  - ctab  - abd soft ntnd  - no obvious deformity at knees    A/P  - fall, eval trauma, HA, r/o bleed  - XR knees, CT head

## 2020-03-28 NOTE — ED PROVIDER NOTE - CLINICAL SUMMARY MEDICAL DECISION MAKING FREE TEXT BOX
Fall s/p HA/lightheadedness. Pt well appearing, HD stable, normal neuro exam, no external signs of trauma. Low suspicion for SAH but will screen with CT. Will r/o traumatic injury with XRs, screen basic labs, & reassess.

## 2020-03-28 NOTE — ED PROVIDER NOTE - OBJECTIVE STATEMENT
74F with PMH of HTN, HLD, DM, DVT? p/w bilateral knee pain s/p fall. States she gradually developed global headache and lightheadedness earlier today which caused her to lose balance and fall. HA/lightheadedness better now. Denies any LOC, head trauma, visual changes, numbness, weakness, chest pain, SOB.

## 2020-03-28 NOTE — ED PROVIDER NOTE - NS ED ROS FT
ROS:  GENERAL: No fever, no chills  EYES: no change in vision  HEENT: no trouble swallowing, no trouble speaking  CARDIAC: no chest pain  PULMONARY: no cough/SOB  GI: no abdominal pain, no nausea, no vomiting, no diarrhea, no constipation  : No dysuria, no frequency, no change in appearance, or odor of urine  SKIN: no rashes  NEURO: HA. no weakness/numbness  MSK: b/l knee pain     Eric Marinelli PGY2

## 2020-03-28 NOTE — ED PROVIDER NOTE - PHYSICAL EXAMINATION
Gen: AAOx3, non-toxic  Head: NCAT  HEENT: EOMI, oral mucosa moist, normal conjunctiva  Lung: CTAB, no respiratory distress, no wheezes/rhonchi/rales B/L, speaking in full sentences  CV: RRR, no murmurs, rubs or gallops  Abd: soft, NTND, no guarding, no CVA tenderness  MSK: +TTP over b/l knees. no visible deformities  Neuro: No focal sensory or motor deficits, normal CN exam   Skin: no external signs of trauma. Warm, well perfused, no rash  Psych: normal affect.     ~Eric Marinelli PGY2

## 2020-03-29 DIAGNOSIS — M25.561 PAIN IN RIGHT KNEE: ICD-10-CM

## 2020-03-29 DIAGNOSIS — R50.9 FEVER, UNSPECIFIED: ICD-10-CM

## 2020-03-29 DIAGNOSIS — Z02.9 ENCOUNTER FOR ADMINISTRATIVE EXAMINATIONS, UNSPECIFIED: ICD-10-CM

## 2020-03-29 DIAGNOSIS — E11.9 TYPE 2 DIABETES MELLITUS WITHOUT COMPLICATIONS: ICD-10-CM

## 2020-03-29 DIAGNOSIS — Z29.9 ENCOUNTER FOR PROPHYLACTIC MEASURES, UNSPECIFIED: ICD-10-CM

## 2020-03-29 DIAGNOSIS — I10 ESSENTIAL (PRIMARY) HYPERTENSION: ICD-10-CM

## 2020-03-29 DIAGNOSIS — Z79.01 LONG TERM (CURRENT) USE OF ANTICOAGULANTS: ICD-10-CM

## 2020-03-29 DIAGNOSIS — R51 HEADACHE: ICD-10-CM

## 2020-03-29 LAB
ALBUMIN SERPL ELPH-MCNC: 4.3 G/DL — SIGNIFICANT CHANGE UP (ref 3.3–5)
ALP SERPL-CCNC: 191 U/L — HIGH (ref 40–120)
ALT FLD-CCNC: 31 U/L — SIGNIFICANT CHANGE UP (ref 4–33)
ANION GAP SERPL CALC-SCNC: 14 MMO/L — SIGNIFICANT CHANGE UP (ref 7–14)
APPEARANCE UR: CLEAR — SIGNIFICANT CHANGE UP
APTT BLD: 31.1 SEC — SIGNIFICANT CHANGE UP (ref 27.5–36.3)
AST SERPL-CCNC: 28 U/L — SIGNIFICANT CHANGE UP (ref 4–32)
BASOPHILS # BLD AUTO: 0.04 K/UL — SIGNIFICANT CHANGE UP (ref 0–0.2)
BASOPHILS NFR BLD AUTO: 1.5 % — SIGNIFICANT CHANGE UP (ref 0–2)
BILIRUB SERPL-MCNC: 0.2 MG/DL — SIGNIFICANT CHANGE UP (ref 0.2–1.2)
BILIRUB UR-MCNC: NEGATIVE — SIGNIFICANT CHANGE UP
BLOOD UR QL VISUAL: NEGATIVE — SIGNIFICANT CHANGE UP
BUN SERPL-MCNC: 8 MG/DL — SIGNIFICANT CHANGE UP (ref 7–23)
CALCIUM SERPL-MCNC: 9.5 MG/DL — SIGNIFICANT CHANGE UP (ref 8.4–10.5)
CHLORIDE SERPL-SCNC: 101 MMOL/L — SIGNIFICANT CHANGE UP (ref 98–107)
CK SERPL-CCNC: 222 U/L — HIGH (ref 25–170)
CO2 SERPL-SCNC: 25 MMOL/L — SIGNIFICANT CHANGE UP (ref 22–31)
COLOR SPEC: SIGNIFICANT CHANGE UP
CREAT SERPL-MCNC: 0.72 MG/DL — SIGNIFICANT CHANGE UP (ref 0.5–1.3)
CRP SERPL-MCNC: 6.5 MG/L — HIGH
D DIMER BLD IA.RAPID-MCNC: 185 NG/ML — SIGNIFICANT CHANGE UP
EOSINOPHIL # BLD AUTO: 0 K/UL — SIGNIFICANT CHANGE UP (ref 0–0.5)
EOSINOPHIL NFR BLD AUTO: 0 % — SIGNIFICANT CHANGE UP (ref 0–6)
FERRITIN SERPL-MCNC: 74.26 NG/ML — SIGNIFICANT CHANGE UP (ref 15–150)
GLUCOSE SERPL-MCNC: 97 MG/DL — SIGNIFICANT CHANGE UP (ref 70–99)
GLUCOSE UR-MCNC: NEGATIVE — SIGNIFICANT CHANGE UP
HCT VFR BLD CALC: 38.7 % — SIGNIFICANT CHANGE UP (ref 34.5–45)
HGB BLD-MCNC: 12.6 G/DL — SIGNIFICANT CHANGE UP (ref 11.5–15.5)
IMM GRANULOCYTES NFR BLD AUTO: 0.4 % — SIGNIFICANT CHANGE UP (ref 0–1.5)
INR BLD: 1.1 — SIGNIFICANT CHANGE UP (ref 0.88–1.17)
KETONES UR-MCNC: SIGNIFICANT CHANGE UP
LACTATE SERPL-SCNC: 1 MMOL/L — SIGNIFICANT CHANGE UP (ref 0.5–2)
LDH SERPL L TO P-CCNC: 163 U/L — SIGNIFICANT CHANGE UP (ref 135–225)
LEUKOCYTE ESTERASE UR-ACNC: NEGATIVE — SIGNIFICANT CHANGE UP
LYMPHOCYTES # BLD AUTO: 1.27 K/UL — SIGNIFICANT CHANGE UP (ref 1–3.3)
LYMPHOCYTES # BLD AUTO: 48.7 % — HIGH (ref 13–44)
MAGNESIUM SERPL-MCNC: 2.4 MG/DL — SIGNIFICANT CHANGE UP (ref 1.6–2.6)
MCHC RBC-ENTMCNC: 24.5 PG — LOW (ref 27–34)
MCHC RBC-ENTMCNC: 32.6 % — SIGNIFICANT CHANGE UP (ref 32–36)
MCV RBC AUTO: 75.3 FL — LOW (ref 80–100)
MONOCYTES # BLD AUTO: 0.32 K/UL — SIGNIFICANT CHANGE UP (ref 0–0.9)
MONOCYTES NFR BLD AUTO: 12.3 % — SIGNIFICANT CHANGE UP (ref 2–14)
NEUTROPHILS # BLD AUTO: 0.97 K/UL — LOW (ref 1.8–7.4)
NEUTROPHILS NFR BLD AUTO: 37.1 % — LOW (ref 43–77)
NITRITE UR-MCNC: NEGATIVE — SIGNIFICANT CHANGE UP
NRBC # FLD: 0 K/UL — SIGNIFICANT CHANGE UP (ref 0–0)
PH UR: 6 — SIGNIFICANT CHANGE UP (ref 5–8)
PHOSPHATE SERPL-MCNC: 3.4 MG/DL — SIGNIFICANT CHANGE UP (ref 2.5–4.5)
PLATELET # BLD AUTO: 190 K/UL — SIGNIFICANT CHANGE UP (ref 150–400)
PMV BLD: 9.9 FL — SIGNIFICANT CHANGE UP (ref 7–13)
POTASSIUM SERPL-MCNC: 3.2 MMOL/L — LOW (ref 3.5–5.3)
POTASSIUM SERPL-SCNC: 3.2 MMOL/L — LOW (ref 3.5–5.3)
PROCALCITONIN SERPL-MCNC: 0.08 NG/ML — SIGNIFICANT CHANGE UP (ref 0.02–0.1)
PROT SERPL-MCNC: 7.3 G/DL — SIGNIFICANT CHANGE UP (ref 6–8.3)
PROT UR-MCNC: 10 — SIGNIFICANT CHANGE UP
PROTHROM AB SERPL-ACNC: 12.7 SEC — SIGNIFICANT CHANGE UP (ref 9.8–13.1)
RBC # BLD: 5.14 M/UL — SIGNIFICANT CHANGE UP (ref 3.8–5.2)
RBC # FLD: 14.9 % — HIGH (ref 10.3–14.5)
SARS-COV-2 RNA SPEC QL NAA+PROBE: DETECTED
SODIUM SERPL-SCNC: 140 MMOL/L — SIGNIFICANT CHANGE UP (ref 135–145)
SP GR SPEC: 1.01 — SIGNIFICANT CHANGE UP (ref 1–1.04)
TROPONIN T, HIGH SENSITIVITY: 12 NG/L — SIGNIFICANT CHANGE UP (ref ?–14)
UROBILINOGEN FLD QL: NORMAL — SIGNIFICANT CHANGE UP
WBC # BLD: 2.61 K/UL — LOW (ref 3.8–10.5)
WBC # FLD AUTO: 2.61 K/UL — LOW (ref 3.8–10.5)

## 2020-03-29 PROCEDURE — 72170 X-RAY EXAM OF PELVIS: CPT | Mod: 26

## 2020-03-29 PROCEDURE — 99223 1ST HOSP IP/OBS HIGH 75: CPT

## 2020-03-29 PROCEDURE — 73564 X-RAY EXAM KNEE 4 OR MORE: CPT | Mod: 26,50

## 2020-03-29 PROCEDURE — 71045 X-RAY EXAM CHEST 1 VIEW: CPT | Mod: 26

## 2020-03-29 RX ORDER — DEXTROSE 50 % IN WATER 50 %
12.5 SYRINGE (ML) INTRAVENOUS ONCE
Refills: 0 | Status: DISCONTINUED | OUTPATIENT
Start: 2020-03-29 | End: 2020-04-03

## 2020-03-29 RX ORDER — INSULIN LISPRO 100/ML
VIAL (ML) SUBCUTANEOUS AT BEDTIME
Refills: 0 | Status: DISCONTINUED | OUTPATIENT
Start: 2020-03-29 | End: 2020-04-03

## 2020-03-29 RX ORDER — ENOXAPARIN SODIUM 100 MG/ML
40 INJECTION SUBCUTANEOUS DAILY
Refills: 0 | Status: DISCONTINUED | OUTPATIENT
Start: 2020-03-29 | End: 2020-03-29

## 2020-03-29 RX ORDER — PANTOPRAZOLE SODIUM 20 MG/1
40 TABLET, DELAYED RELEASE ORAL
Refills: 0 | Status: DISCONTINUED | OUTPATIENT
Start: 2020-03-29 | End: 2020-04-03

## 2020-03-29 RX ORDER — ATORVASTATIN CALCIUM 80 MG/1
20 TABLET, FILM COATED ORAL AT BEDTIME
Refills: 0 | Status: DISCONTINUED | OUTPATIENT
Start: 2020-03-29 | End: 2020-04-03

## 2020-03-29 RX ORDER — DICLOFENAC SODIUM 75 MG/1
50 TABLET, DELAYED RELEASE ORAL
Refills: 0 | Status: DISCONTINUED | OUTPATIENT
Start: 2020-03-29 | End: 2020-03-30

## 2020-03-29 RX ORDER — METFORMIN HYDROCHLORIDE 850 MG/1
1 TABLET ORAL
Qty: 0 | Refills: 0 | DISCHARGE

## 2020-03-29 RX ORDER — DICLOFENAC SODIUM 30 MG/G
1 GEL TOPICAL
Qty: 0 | Refills: 0 | DISCHARGE

## 2020-03-29 RX ORDER — SODIUM CHLORIDE 9 MG/ML
1000 INJECTION, SOLUTION INTRAVENOUS
Refills: 0 | Status: DISCONTINUED | OUTPATIENT
Start: 2020-03-29 | End: 2020-04-03

## 2020-03-29 RX ORDER — PHENAZOPYRIDINE HCL 100 MG
200 TABLET ORAL EVERY 8 HOURS
Refills: 0 | Status: DISCONTINUED | OUTPATIENT
Start: 2020-03-29 | End: 2020-04-03

## 2020-03-29 RX ORDER — DEXTROSE 50 % IN WATER 50 %
25 SYRINGE (ML) INTRAVENOUS ONCE
Refills: 0 | Status: DISCONTINUED | OUTPATIENT
Start: 2020-03-29 | End: 2020-04-03

## 2020-03-29 RX ORDER — INSULIN LISPRO 100/ML
VIAL (ML) SUBCUTANEOUS
Refills: 0 | Status: DISCONTINUED | OUTPATIENT
Start: 2020-03-29 | End: 2020-04-03

## 2020-03-29 RX ORDER — GLUCAGON INJECTION, SOLUTION 0.5 MG/.1ML
1 INJECTION, SOLUTION SUBCUTANEOUS ONCE
Refills: 0 | Status: DISCONTINUED | OUTPATIENT
Start: 2020-03-29 | End: 2020-04-03

## 2020-03-29 RX ORDER — ATORVASTATIN CALCIUM 80 MG/1
1 TABLET, FILM COATED ORAL
Qty: 0 | Refills: 0 | DISCHARGE

## 2020-03-29 RX ORDER — ACETAMINOPHEN 500 MG
650 TABLET ORAL EVERY 4 HOURS
Refills: 0 | Status: DISCONTINUED | OUTPATIENT
Start: 2020-03-29 | End: 2020-04-03

## 2020-03-29 RX ORDER — LIDOCAINE 4 G/100G
1 CREAM TOPICAL DAILY
Refills: 0 | Status: DISCONTINUED | OUTPATIENT
Start: 2020-03-29 | End: 2020-04-03

## 2020-03-29 RX ORDER — RIVAROXABAN 15 MG-20MG
20 KIT ORAL
Refills: 0 | Status: DISCONTINUED | OUTPATIENT
Start: 2020-03-29 | End: 2020-04-03

## 2020-03-29 RX ORDER — OMEPRAZOLE 10 MG/1
1 CAPSULE, DELAYED RELEASE ORAL
Qty: 0 | Refills: 0 | DISCHARGE

## 2020-03-29 RX ORDER — DEXTROSE 50 % IN WATER 50 %
15 SYRINGE (ML) INTRAVENOUS ONCE
Refills: 0 | Status: DISCONTINUED | OUTPATIENT
Start: 2020-03-29 | End: 2020-04-03

## 2020-03-29 RX ORDER — MIRABEGRON 50 MG/1
1 TABLET, EXTENDED RELEASE ORAL
Qty: 0 | Refills: 0 | DISCHARGE

## 2020-03-29 RX ORDER — AMLODIPINE BESYLATE 2.5 MG/1
0 TABLET ORAL
Qty: 0 | Refills: 0 | DISCHARGE

## 2020-03-29 RX ORDER — PHENAZOPYRIDINE HCL 100 MG
1 TABLET ORAL
Qty: 0 | Refills: 0 | DISCHARGE

## 2020-03-29 RX ORDER — ACETAMINOPHEN 500 MG
650 TABLET ORAL EVERY 4 HOURS
Refills: 0 | Status: DISCONTINUED | OUTPATIENT
Start: 2020-03-29 | End: 2020-03-29

## 2020-03-29 RX ORDER — ACETAMINOPHEN 500 MG
650 TABLET ORAL EVERY 6 HOURS
Refills: 0 | Status: DISCONTINUED | OUTPATIENT
Start: 2020-03-29 | End: 2020-04-02

## 2020-03-29 RX ORDER — AMLODIPINE BESYLATE 2.5 MG/1
5 TABLET ORAL DAILY
Refills: 0 | Status: DISCONTINUED | OUTPATIENT
Start: 2020-03-29 | End: 2020-04-03

## 2020-03-29 RX ADMIN — AMLODIPINE BESYLATE 5 MILLIGRAM(S): 2.5 TABLET ORAL at 12:48

## 2020-03-29 RX ADMIN — Medication 650 MILLIGRAM(S): at 23:35

## 2020-03-29 RX ADMIN — Medication 1 TABLET(S): at 12:48

## 2020-03-29 RX ADMIN — PANTOPRAZOLE SODIUM 40 MILLIGRAM(S): 20 TABLET, DELAYED RELEASE ORAL at 12:48

## 2020-03-29 RX ADMIN — Medication 2: at 13:08

## 2020-03-29 RX ADMIN — DICLOFENAC SODIUM 50 MILLIGRAM(S): 75 TABLET, DELAYED RELEASE ORAL at 17:31

## 2020-03-29 RX ADMIN — ATORVASTATIN CALCIUM 20 MILLIGRAM(S): 80 TABLET, FILM COATED ORAL at 22:27

## 2020-03-29 RX ADMIN — RIVAROXABAN 20 MILLIGRAM(S): KIT at 18:42

## 2020-03-29 NOTE — H&P ADULT - ASSESSMENT
74F with HTN, HLD, T2DM, DVT presented with knee pain after fall at home. low grade fever. rule-out COVID

## 2020-03-29 NOTE — H&P ADULT - NSHPPHYSICALEXAM_GEN_ALL_CORE
T(C): 37.3 (03-29-20 @ 06:34), Max: 37.8 (03-28-20 @ 22:54)  HR: 85 (03-29-20 @ 06:34) (85 - 92)  BP: 143/80 (03-29-20 @ 06:34) (143/80 - 176/96)  RR: 18 (03-29-20 @ 06:34) (16 - 18)  SpO2: 95% (03-29-20 @ 06:34) (95% - 98%)    GENERAL: no apparent distress, on room air  HEAD:  Atraumatic, Normocephalic  EYES: EOMI, PERRLA, conjunctiva and sclera clear b/l  NECK: No cervical lymphadenopathy; no obvious masses.   CHEST/LUNG: Clear to auscultation bilaterally; No wheezing or crackles  HEART: Regular rate and rhythm; No obvious murmurs; nl S1/S2; No peripheral edema  ABDOMEN: Soft, Nontender, Nondistended; Bowel sounds present  EXTREMITIES:  2+ Peripheral Pulses; No joint swelling.  PSYCH: normal affect, calm demeanor  NEUROLOGY: Awake and alert; CN 2-12 grossly intact; no obvious FND  SKIN: No rashes or lesions T(C): 37.3 (03-29-20 @ 06:34), Max: 37.8 (03-28-20 @ 22:54)  HR: 85 (03-29-20 @ 06:34) (85 - 92)  BP: 143/80 (03-29-20 @ 06:34) (143/80 - 176/96)  RR: 18 (03-29-20 @ 06:34) (16 - 18)  SpO2: 95% (03-29-20 @ 06:34) (95% - 98%)    GENERAL: no apparent distress, on room air  HEAD:  Atraumatic, Normocephalic  EYES: EOMI, PERRLA, conjunctiva and sclera clear b/l  NECK: No cervical lymphadenopathy; no obvious masses.   CHEST/LUNG: Clear to auscultation bilaterally; No wheezing or crackles  HEART: Regular rate and rhythm; No obvious murmurs; nl S1/S2; No peripheral edema  ABDOMEN: Soft, Nontender, Nondistended; Bowel sounds present  EXTREMITIES:  2+ Peripheral Pulses; No joint swelling. knee and R hip pain on palpation.   PSYCH: normal affect, calm demeanor  NEUROLOGY: Awake and alert; CN 2-12 grossly intact; no obvious FND  SKIN: No rashes or lesions

## 2020-03-29 NOTE — ED ADULT NURSE NOTE - OBJECTIVE STATEMENT
S/p fall/ possible syncope without injury. Skin intact. IV placed R arm. Patient awake, alert. Walks with walker @ baseline? Straight cath for urine 600mL drained

## 2020-03-29 NOTE — ED ADULT NURSE REASSESSMENT NOTE - NS ED NURSE REASSESS COMMENT FT1
receiving pt from night MISSY MONZON Pt aox3, slow to answer questions. pt breathing even and non labored. pt oxygenating at 100% on room air. pt denies chest pain, sob, n/v/d. pt awaiting room.

## 2020-03-29 NOTE — H&P ADULT - NSICDXPASTMEDICALHX_GEN_ALL_CORE_FT
PAST MEDICAL HISTORY:  Arthritis     Back Pain     Diabetes     HLD (hyperlipidemia)     Hypertension

## 2020-03-29 NOTE — H&P ADULT - PROBLEM SELECTOR PLAN 7
1.  Name of PCP: Assisted living.   2.  PCP Contacted on Admission: [ ] Y    [x] N    3.  PCP contacted at Discharge: [ ] Y    [ ] N    [ ] N/A  4.  Post-Discharge Appointment Date and Location:  5.  Summary of Handoff given to PCP:

## 2020-03-29 NOTE — H&P ADULT - NSHPREVIEWOFSYSTEMS_GEN_ALL_CORE
CONSTITUTIONAL: No fever; No chills; No night sweats; No weight loss; No fatigue  EYES: No eye pain; No blurry vision  ENMT:  No difficulty hearing; No sinus or throat pain  NECK: No pain or stiffness  RESPIRATORY: No cough; No wheezing; No hemoptysis; No shortness of breath; No sputum production  CARDIOVASCULAR: No chest pain; No palpitations; No leg swelling  GASTROINTESTINAL: No abdominal pain; No nausea; No vomiting; No hematemesis; No diarrhea; No constipation. No melena or hematochezia.  GENITOURINARY: No dysuria; No frequency; No hematuria; No incontinence  NEUROLOGICAL: No headaches; No loss of strength; No numbness  SKIN: No itching/burning; No rashes or lesions   MUSCULOSKELETAL: knee pain  HEME/LYMPH: No easy bruising, or bleeding gums CONSTITUTIONAL: No fever; No chills; No night sweats; No weight loss; No fatigue  EYES: No eye pain; No blurry vision  ENMT:  No difficulty hearing; No sinus or throat pain  NECK: No pain or stiffness  RESPIRATORY: No cough; No wheezing; No hemoptysis; No shortness of breath; No sputum production  CARDIOVASCULAR: No chest pain; No palpitations; No leg swelling  GASTROINTESTINAL: No abdominal pain; No nausea; No vomiting; No hematemesis; No diarrhea; No constipation. No melena or hematochezia.  GENITOURINARY: No dysuria; No frequency; No hematuria; No incontinence  NEUROLOGICAL: No headaches; No loss of strength; No numbness  SKIN: No itching/burning; No rashes or lesions   MUSCULOSKELETAL: knee pain and hip pain.  HEME/LYMPH: No easy bruising, or bleeding gums

## 2020-03-29 NOTE — H&P ADULT - PROBLEM SELECTOR PLAN 1
no respiratory symptoms  CXR clear  COVID19 PCR send in ED  strict isolation precaution.   monitor respiratory status, currently on RA  O2 supplement PRN, titrate off as tolerated.   check baseline inflammatory labs: ESR, CRP, Ferritin, Procalcitonin, CPK, Trop, D-dimer

## 2020-03-29 NOTE — H&P ADULT - HISTORY OF PRESENT ILLNESS
74F with PMH of HTN, HLD, DM, DVT? p/w bilateral knee pain s/p fall. States she gradually developed global headache and lightheadedness earlier today which caused her to lose balance and fall. HA/lightheadedness better now. Denies any LOC, head trauma, visual changes, numbness, weakness, chest pain, SOB.    In ED, afebrile. vss. 74F from snf with PMH of HTN, HLD, DM, DVT? p/w bilateral knee pain s/p fall. States she gradually developed global headache and lightheadedness earlier today which caused her to lose balance and fall. HA/lightheadedness better now. Denies any LOC, head trauma, visual changes, numbness, weakness, chest pain, SOB.    In ED, afebrile. vss. no respiratory distress, no hypoxia. Labs unremarkable. COVID pcr send

## 2020-03-29 NOTE — H&P ADULT - PROBLEM SELECTOR PLAN 5
DVT: Lovenox  Diet: Regular/DM/DASH  Dispo: needs PT prior to discharge after COVID r/o unclear reason for Xarelto  will cont for now.

## 2020-03-30 ENCOUNTER — NON-APPOINTMENT (OUTPATIENT)
Age: 75
End: 2020-03-30

## 2020-03-30 DIAGNOSIS — B34.2 CORONAVIRUS INFECTION, UNSPECIFIED: ICD-10-CM

## 2020-03-30 DIAGNOSIS — I82.409 ACUTE EMBOLISM AND THROMBOSIS OF UNSPECIFIED DEEP VEINS OF UNSPECIFIED LOWER EXTREMITY: ICD-10-CM

## 2020-03-30 DIAGNOSIS — E78.49 OTHER HYPERLIPIDEMIA: ICD-10-CM

## 2020-03-30 LAB
ALBUMIN SERPL ELPH-MCNC: 3.8 G/DL — SIGNIFICANT CHANGE UP (ref 3.3–5)
ALP SERPL-CCNC: 168 U/L — HIGH (ref 40–120)
ALT FLD-CCNC: 31 U/L — SIGNIFICANT CHANGE UP (ref 4–33)
ANION GAP SERPL CALC-SCNC: 11 MMO/L — SIGNIFICANT CHANGE UP (ref 7–14)
AST SERPL-CCNC: 25 U/L — SIGNIFICANT CHANGE UP (ref 4–32)
BASOPHILS # BLD AUTO: 0.05 K/UL — SIGNIFICANT CHANGE UP (ref 0–0.2)
BASOPHILS NFR BLD AUTO: 1.6 % — SIGNIFICANT CHANGE UP (ref 0–2)
BILIRUB SERPL-MCNC: 0.2 MG/DL — SIGNIFICANT CHANGE UP (ref 0.2–1.2)
BUN SERPL-MCNC: 11 MG/DL — SIGNIFICANT CHANGE UP (ref 7–23)
CALCIUM SERPL-MCNC: 9.4 MG/DL — SIGNIFICANT CHANGE UP (ref 8.4–10.5)
CHLORIDE SERPL-SCNC: 104 MMOL/L — SIGNIFICANT CHANGE UP (ref 98–107)
CO2 SERPL-SCNC: 25 MMOL/L — SIGNIFICANT CHANGE UP (ref 22–31)
CREAT SERPL-MCNC: 0.77 MG/DL — SIGNIFICANT CHANGE UP (ref 0.5–1.3)
CULTURE RESULTS: NO GROWTH — SIGNIFICANT CHANGE UP
EOSINOPHIL # BLD AUTO: 0 K/UL — SIGNIFICANT CHANGE UP (ref 0–0.5)
EOSINOPHIL NFR BLD AUTO: 0 % — SIGNIFICANT CHANGE UP (ref 0–6)
GLUCOSE SERPL-MCNC: 111 MG/DL — HIGH (ref 70–99)
HBA1C BLD-MCNC: 6.5 % — HIGH (ref 4–5.6)
HCT VFR BLD CALC: 40.3 % — SIGNIFICANT CHANGE UP (ref 34.5–45)
HGB BLD-MCNC: 12.9 G/DL — SIGNIFICANT CHANGE UP (ref 11.5–15.5)
IMM GRANULOCYTES NFR BLD AUTO: 0.3 % — SIGNIFICANT CHANGE UP (ref 0–1.5)
LYMPHOCYTES # BLD AUTO: 1.58 K/UL — SIGNIFICANT CHANGE UP (ref 1–3.3)
LYMPHOCYTES # BLD AUTO: 51.5 % — HIGH (ref 13–44)
MCHC RBC-ENTMCNC: 23.9 PG — LOW (ref 27–34)
MCHC RBC-ENTMCNC: 32 % — SIGNIFICANT CHANGE UP (ref 32–36)
MCV RBC AUTO: 74.6 FL — LOW (ref 80–100)
MONOCYTES # BLD AUTO: 0.31 K/UL — SIGNIFICANT CHANGE UP (ref 0–0.9)
MONOCYTES NFR BLD AUTO: 10.1 % — SIGNIFICANT CHANGE UP (ref 2–14)
NEUTROPHILS # BLD AUTO: 1.12 K/UL — LOW (ref 1.8–7.4)
NEUTROPHILS NFR BLD AUTO: 36.5 % — LOW (ref 43–77)
NRBC # FLD: 0 K/UL — SIGNIFICANT CHANGE UP (ref 0–0)
PLATELET # BLD AUTO: 186 K/UL — SIGNIFICANT CHANGE UP (ref 150–400)
PMV BLD: 10.3 FL — SIGNIFICANT CHANGE UP (ref 7–13)
POTASSIUM SERPL-MCNC: 3.2 MMOL/L — LOW (ref 3.5–5.3)
POTASSIUM SERPL-SCNC: 3.2 MMOL/L — LOW (ref 3.5–5.3)
PROT SERPL-MCNC: 6.9 G/DL — SIGNIFICANT CHANGE UP (ref 6–8.3)
RBC # BLD: 5.4 M/UL — HIGH (ref 3.8–5.2)
RBC # FLD: 15.2 % — HIGH (ref 10.3–14.5)
SODIUM SERPL-SCNC: 140 MMOL/L — SIGNIFICANT CHANGE UP (ref 135–145)
SPECIMEN SOURCE: SIGNIFICANT CHANGE UP
WBC # BLD: 3.07 K/UL — LOW (ref 3.8–10.5)
WBC # FLD AUTO: 3.07 K/UL — LOW (ref 3.8–10.5)

## 2020-03-30 RX ORDER — POTASSIUM CHLORIDE 20 MEQ
40 PACKET (EA) ORAL ONCE
Refills: 0 | Status: COMPLETED | OUTPATIENT
Start: 2020-03-30 | End: 2020-03-30

## 2020-03-30 RX ORDER — CAPSAICIN 0.025 %
1 CREAM (GRAM) TOPICAL EVERY 6 HOURS
Refills: 0 | Status: DISCONTINUED | OUTPATIENT
Start: 2020-03-30 | End: 2020-04-03

## 2020-03-30 RX ORDER — OXYCODONE HYDROCHLORIDE 5 MG/1
5 TABLET ORAL ONCE
Refills: 0 | Status: DISCONTINUED | OUTPATIENT
Start: 2020-03-30 | End: 2020-03-30

## 2020-03-30 RX ADMIN — LIDOCAINE 1 PATCH: 4 CREAM TOPICAL at 18:06

## 2020-03-30 RX ADMIN — AMLODIPINE BESYLATE 5 MILLIGRAM(S): 2.5 TABLET ORAL at 05:36

## 2020-03-30 RX ADMIN — Medication 1 APPLICATION(S): at 18:06

## 2020-03-30 RX ADMIN — Medication 40 MILLIEQUIVALENT(S): at 18:06

## 2020-03-30 RX ADMIN — Medication 40 MILLIEQUIVALENT(S): at 11:19

## 2020-03-30 RX ADMIN — PANTOPRAZOLE SODIUM 40 MILLIGRAM(S): 20 TABLET, DELAYED RELEASE ORAL at 05:36

## 2020-03-30 RX ADMIN — Medication 1 APPLICATION(S): at 11:19

## 2020-03-30 RX ADMIN — ATORVASTATIN CALCIUM 20 MILLIGRAM(S): 80 TABLET, FILM COATED ORAL at 21:52

## 2020-03-30 RX ADMIN — Medication 1 APPLICATION(S): at 05:36

## 2020-03-30 RX ADMIN — Medication 1 APPLICATION(S): at 21:55

## 2020-03-30 RX ADMIN — Medication 650 MILLIGRAM(S): at 11:26

## 2020-03-30 RX ADMIN — RIVAROXABAN 20 MILLIGRAM(S): KIT at 18:06

## 2020-03-30 RX ADMIN — Medication 1 TABLET(S): at 11:19

## 2020-03-30 RX ADMIN — Medication 2: at 12:23

## 2020-03-30 RX ADMIN — OXYCODONE HYDROCHLORIDE 5 MILLIGRAM(S): 5 TABLET ORAL at 18:23

## 2020-03-30 NOTE — CHART NOTE - NSCHARTNOTEFT_GEN_A_CORE
74F with PMHx of HTN, HLD, T2DM, DVT presented with knee pain after fall at home. low grade fever. COVID+    Notified by RN that the patient was complaining of right ear and facial pain. Upon exam the patient states the pain is on the left and right  of her face along her jaw and side of forehead. The pain radiates to her right ear and slightly down the right side of her neck. The pain is a stabbing/burning pain that hurts more with lying on it, speaking, or smiling. The patient said the pain is new within a couple hours. She also states she is experiencing some right eye blurriness when this began but no changes with vision in her left eye. The patient denies HA, dizziness, difficulty talking, tinnitus, itchiness, or rashes.     Vital Signs Last 24 Hrs  T(C): 36.7 (29 Mar 2020 22:30), Max: 37.8 (29 Mar 2020 18:43)  T(F): 98.1 (29 Mar 2020 22:30), Max: 100.1 (29 Mar 2020 18:43)  HR: 87 (29 Mar 2020 22:30) (76 - 90)  BP: 128/78 (29 Mar 2020 22:30) (128/78 - 172/80)  BP(mean): --  RR: 19 (29 Mar 2020 22:30) (18 - 20)  SpO2: 98% (29 Mar 2020 22:30) (95% - 100%)    PE:   AAOx3. Patient was lying in bed on her back on RA. There was a right lip drooping noted upon looking at the patient.   Skin: no rashes or increased temp noted. The left and right side of the patient's face was extremely tender to touch. Patient was able to see that I was holding up various number of fingers.  Neuro: speech was slightly slurred but I was able to understand what she was saying. Smile was equal on both sides. She stated it was painful to smile but she was able to do it. Patient was able to hold up both of her arm without droop. She was not able to lift her legs due to chronic back pain but she wiggled her toes equally on both feet. Strength was diminished to patient's baseline, but equal on both sides of the body.  strength in hands was equal bilaterally.     A/P:     Facial pain:   - Curbsided neuro to discuss case. Recommended neuro check in 2 hours and then again in the morning to assess patient's status and to call back if the patient's condition worsens. Suspected viral etiology causing the patients facial pain potentially from COVID  - Tylenol 650 mg 1 tab PO now for facial pain    - 2-hour post exam neuro check: Patient's lip droop has improved along with her speech and pain  - Will continue to monitor the patient for changes.

## 2020-03-30 NOTE — PHYSICAL THERAPY INITIAL EVALUATION ADULT - PERTINENT HX OF CURRENT PROBLEM, REHAB EVAL
Patient is 74 year old female admitted with PMHx of HTN, HLD, T2DM, DVT presented with knee pain after fall at home. low grade fever. COVID+

## 2020-03-30 NOTE — PROGRESS NOTE ADULT - SUBJECTIVE AND OBJECTIVE BOX
Patient is a 74y old  Female who presents with a chief complaint of fall, rule-out covid (29 Mar 2020 09:25)      SUBJECTIVE / OVERNIGHT EVENTS:   Patient noted to have numbness of the face which has resolved currently CT head done is negative       MEDICATIONS  (STANDING):  amLODIPine   Tablet 5 milliGRAM(s) Oral daily  atorvastatin 20 milliGRAM(s) Oral at bedtime  capsaicin HP 0.075% Cream 1 Application(s) Topical every 6 hours  dextrose 5%. 1000 milliLiter(s) (50 mL/Hr) IV Continuous <Continuous>  dextrose 50% Injectable 12.5 Gram(s) IV Push once  dextrose 50% Injectable 25 Gram(s) IV Push once  dextrose 50% Injectable 25 Gram(s) IV Push once  insulin lispro (HumaLOG) corrective regimen sliding scale   SubCutaneous three times a day before meals  insulin lispro (HumaLOG) corrective regimen sliding scale   SubCutaneous at bedtime  lidocaine   Patch 1 Patch Transdermal daily  multivitamin 1 Tablet(s) Oral daily  pantoprazole    Tablet 40 milliGRAM(s) Oral before breakfast  rivaroxaban 20 milliGRAM(s) Oral with dinner    MEDICATIONS  (PRN):  acetaminophen   Tablet .. 650 milliGRAM(s) Oral every 6 hours PRN Temp greater or equal to 38.5C (101.3F), Mild Pain (1 - 3), Moderate Pain (4 - 6)  acetaminophen  Suppository .. 650 milliGRAM(s) Rectal every 4 hours PRN Temp greater or equal to 38.5C (101.3F)  dextrose 40% Gel 15 Gram(s) Oral once PRN Blood Glucose LESS THAN 70 milliGRAM(s)/deciliter  glucagon  Injectable 1 milliGRAM(s) IntraMuscular once PRN Glucose LESS THAN 70 milligrams/deciliter  phenazopyridine 200 milliGRAM(s) Oral every 8 hours PRN urinary symptoms      T(C): 37.4 (20 @ 10:46), Max: 37.8 (20 @ 18:43)  HR: 89 (20 @ 10:46) (76 - 89)  BP: 137/73 (20 @ 10:46) (128/78 - 150/70)  RR: 18 (20 @ 10:46) (17 - 20)  SpO2: 99% (20 @ 10:46) (97% - 100%)  CAPILLARY BLOOD GLUCOSE  129 (29 Mar 2020 22:55)      POCT Blood Glucose.: 189 mg/dL (30 Mar 2020 12:14)  POCT Blood Glucose.: 102 mg/dL (30 Mar 2020 09:30)  POCT Blood Glucose.: 122 mg/dL (29 Mar 2020 17:28)    I&O's Summary    29 Mar 2020 07:01  -  30 Mar 2020 07:00  --------------------------------------------------------  IN: 0 mL / OUT: 500 mL / NET: -500 mL    30 Mar 2020 07:01  -  30 Mar 2020 16:15  --------------------------------------------------------  IN: 480 mL / OUT: 425 mL / NET: 55 mL        PHYSICAL EXAM:  GENERAL: not in distress, on room air  EYES: open, sclera clear b/l  CHEST/LUNG: normal respiratory effort, not tachypneic, speaking in full sentences without difficulty  HEART: Not tachycardic, no lower extremity edema b/l  ABDOMEN: Soft, mildly tender of the lower quadrants of the abdomen   EXTREMITIES: Warm and well perfused  NEUROLOGY: awake, alert, responds to Qs appropriately, no gross deficits  PSYCH: calm, cooperative  SKIN: No visible rashes or lesions    LABS:                        12.9   3.07  )-----------( 186      ( 30 Mar 2020 06:00 )             40.3     03-30    140  |  104  |  11  ----------------------------<  111<H>  3.2<L>   |  25  |  0.77    Ca    9.4      30 Mar 2020 06:00  Phos  3.4     03-29  Mg     2.4     03-29    TPro  6.9  /  Alb  3.8  /  TBili  0.2  /  DBili  x   /  AST  25  /  ALT  31  /  AlkPhos  168<H>  03-30    PT/INR - ( 29 Mar 2020 09:51 )   PT: 12.7 SEC;   INR: 1.10          PTT - ( 29 Mar 2020 09:51 )  PTT:31.1 SEC  CARDIAC MARKERS ( 29 Mar 2020 09:58 )  x     / x     / 222 u/L / x     / x          Urinalysis Basic - ( 28 Mar 2020 23:40 )    Color: DARK YELLOW / Appearance: CLEAR / S.015 / pH: 6.0  Gluc: NEGATIVE / Ketone: TRACE  / Bili: NEGATIVE / Urobili: NORMAL   Blood: NEGATIVE / Protein: 10 / Nitrite: NEGATIVE   Leuk Esterase: NEGATIVE / RBC: x / WBC x   Sq Epi: x / Non Sq Epi: x / Bacteria: x        RADIOLOGY & ADDITIONAL TESTS:

## 2020-03-31 LAB
ALBUMIN SERPL ELPH-MCNC: 3.8 G/DL — SIGNIFICANT CHANGE UP (ref 3.3–5)
ALP SERPL-CCNC: 152 U/L — HIGH (ref 40–120)
ALT FLD-CCNC: 24 U/L — SIGNIFICANT CHANGE UP (ref 4–33)
ANION GAP SERPL CALC-SCNC: 12 MMO/L — SIGNIFICANT CHANGE UP (ref 7–14)
AST SERPL-CCNC: 23 U/L — SIGNIFICANT CHANGE UP (ref 4–32)
BASOPHILS # BLD AUTO: 0.02 K/UL — SIGNIFICANT CHANGE UP (ref 0–0.2)
BASOPHILS NFR BLD AUTO: 0.5 % — SIGNIFICANT CHANGE UP (ref 0–2)
BILIRUB SERPL-MCNC: 0.3 MG/DL — SIGNIFICANT CHANGE UP (ref 0.2–1.2)
BUN SERPL-MCNC: 9 MG/DL — SIGNIFICANT CHANGE UP (ref 7–23)
CALCIUM SERPL-MCNC: 9.1 MG/DL — SIGNIFICANT CHANGE UP (ref 8.4–10.5)
CHLORIDE SERPL-SCNC: 103 MMOL/L — SIGNIFICANT CHANGE UP (ref 98–107)
CO2 SERPL-SCNC: 24 MMOL/L — SIGNIFICANT CHANGE UP (ref 22–31)
CREAT SERPL-MCNC: 0.66 MG/DL — SIGNIFICANT CHANGE UP (ref 0.5–1.3)
EOSINOPHIL # BLD AUTO: 0 K/UL — SIGNIFICANT CHANGE UP (ref 0–0.5)
EOSINOPHIL NFR BLD AUTO: 0 % — SIGNIFICANT CHANGE UP (ref 0–6)
GLUCOSE SERPL-MCNC: 122 MG/DL — HIGH (ref 70–99)
HCT VFR BLD CALC: 39.3 % — SIGNIFICANT CHANGE UP (ref 34.5–45)
HGB BLD-MCNC: 12.8 G/DL — SIGNIFICANT CHANGE UP (ref 11.5–15.5)
IMM GRANULOCYTES NFR BLD AUTO: 0.2 % — SIGNIFICANT CHANGE UP (ref 0–1.5)
LYMPHOCYTES # BLD AUTO: 1.15 K/UL — SIGNIFICANT CHANGE UP (ref 1–3.3)
LYMPHOCYTES # BLD AUTO: 27.3 % — SIGNIFICANT CHANGE UP (ref 13–44)
MCHC RBC-ENTMCNC: 24.2 PG — LOW (ref 27–34)
MCHC RBC-ENTMCNC: 32.6 % — SIGNIFICANT CHANGE UP (ref 32–36)
MCV RBC AUTO: 74.4 FL — LOW (ref 80–100)
MONOCYTES # BLD AUTO: 0.29 K/UL — SIGNIFICANT CHANGE UP (ref 0–0.9)
MONOCYTES NFR BLD AUTO: 6.9 % — SIGNIFICANT CHANGE UP (ref 2–14)
NEUTROPHILS # BLD AUTO: 2.74 K/UL — SIGNIFICANT CHANGE UP (ref 1.8–7.4)
NEUTROPHILS NFR BLD AUTO: 65.1 % — SIGNIFICANT CHANGE UP (ref 43–77)
NRBC # FLD: 0 K/UL — SIGNIFICANT CHANGE UP (ref 0–0)
PLATELET # BLD AUTO: 160 K/UL — SIGNIFICANT CHANGE UP (ref 150–400)
PMV BLD: 10 FL — SIGNIFICANT CHANGE UP (ref 7–13)
POTASSIUM SERPL-MCNC: 3.2 MMOL/L — LOW (ref 3.5–5.3)
POTASSIUM SERPL-SCNC: 3.2 MMOL/L — LOW (ref 3.5–5.3)
PROT SERPL-MCNC: 6.8 G/DL — SIGNIFICANT CHANGE UP (ref 6–8.3)
RBC # BLD: 5.28 M/UL — HIGH (ref 3.8–5.2)
RBC # FLD: 14.7 % — HIGH (ref 10.3–14.5)
SODIUM SERPL-SCNC: 139 MMOL/L — SIGNIFICANT CHANGE UP (ref 135–145)
WBC # BLD: 4.21 K/UL — SIGNIFICANT CHANGE UP (ref 3.8–10.5)
WBC # FLD AUTO: 4.21 K/UL — SIGNIFICANT CHANGE UP (ref 3.8–10.5)

## 2020-03-31 PROCEDURE — 74176 CT ABD & PELVIS W/O CONTRAST: CPT | Mod: 26

## 2020-03-31 RX ORDER — POLYETHYLENE GLYCOL 3350 17 G/17G
17 POWDER, FOR SOLUTION ORAL DAILY
Refills: 0 | Status: DISCONTINUED | OUTPATIENT
Start: 2020-03-31 | End: 2020-04-03

## 2020-03-31 RX ORDER — POTASSIUM CHLORIDE 20 MEQ
20 PACKET (EA) ORAL ONCE
Refills: 0 | Status: COMPLETED | OUTPATIENT
Start: 2020-03-31 | End: 2020-03-31

## 2020-03-31 RX ORDER — POTASSIUM CHLORIDE 20 MEQ
10 PACKET (EA) ORAL ONCE
Refills: 0 | Status: DISCONTINUED | OUTPATIENT
Start: 2020-03-31 | End: 2020-03-31

## 2020-03-31 RX ORDER — POTASSIUM CHLORIDE 20 MEQ
40 PACKET (EA) ORAL ONCE
Refills: 0 | Status: COMPLETED | OUTPATIENT
Start: 2020-03-31 | End: 2020-03-31

## 2020-03-31 RX ORDER — SENNA PLUS 8.6 MG/1
2 TABLET ORAL AT BEDTIME
Refills: 0 | Status: DISCONTINUED | OUTPATIENT
Start: 2020-03-31 | End: 2020-04-03

## 2020-03-31 RX ADMIN — Medication 1 APPLICATION(S): at 22:35

## 2020-03-31 RX ADMIN — AMLODIPINE BESYLATE 5 MILLIGRAM(S): 2.5 TABLET ORAL at 05:31

## 2020-03-31 RX ADMIN — SENNA PLUS 2 TABLET(S): 8.6 TABLET ORAL at 22:36

## 2020-03-31 RX ADMIN — Medication 10 MILLIGRAM(S): at 19:17

## 2020-03-31 RX ADMIN — PANTOPRAZOLE SODIUM 40 MILLIGRAM(S): 20 TABLET, DELAYED RELEASE ORAL at 05:31

## 2020-03-31 RX ADMIN — Medication 1 TABLET(S): at 11:33

## 2020-03-31 RX ADMIN — LIDOCAINE 1 PATCH: 4 CREAM TOPICAL at 06:01

## 2020-03-31 RX ADMIN — Medication 1 APPLICATION(S): at 11:33

## 2020-03-31 RX ADMIN — Medication 650 MILLIGRAM(S): at 05:33

## 2020-03-31 RX ADMIN — Medication 40 MILLIEQUIVALENT(S): at 12:59

## 2020-03-31 RX ADMIN — ATORVASTATIN CALCIUM 20 MILLIGRAM(S): 80 TABLET, FILM COATED ORAL at 22:35

## 2020-03-31 RX ADMIN — Medication 1 APPLICATION(S): at 05:31

## 2020-03-31 RX ADMIN — RIVAROXABAN 20 MILLIGRAM(S): KIT at 19:16

## 2020-03-31 RX ADMIN — LIDOCAINE 1 PATCH: 4 CREAM TOPICAL at 13:00

## 2020-03-31 RX ADMIN — LIDOCAINE 1 PATCH: 4 CREAM TOPICAL at 19:51

## 2020-03-31 RX ADMIN — Medication 1 APPLICATION(S): at 19:17

## 2020-03-31 RX ADMIN — POLYETHYLENE GLYCOL 3350 17 GRAM(S): 17 POWDER, FOR SOLUTION ORAL at 19:17

## 2020-03-31 RX ADMIN — Medication 20 MILLIEQUIVALENT(S): at 11:33

## 2020-03-31 NOTE — CHART NOTE - NSCHARTNOTEFT_GEN_A_CORE
Patient with severe abdominal pain, Abdomen distended. CT A/P results discussed with Dr. Bonilla. Patient with no bowel movement past ~3days. Bowel regimen started, C.diff pending. Per recs, spoke with Surgery and curbsided as CT with "mildly distended loops of large bowel." per Surgery- start clear liquids diet, strict bowel regimen. If abdominal pain worsens and patient with N/V, then reconsult surgery team.

## 2020-03-31 NOTE — PROGRESS NOTE ADULT - SUBJECTIVE AND OBJECTIVE BOX
Patient is a 74y old  Female who presents with a chief complaint of fall, rule-out covid (30 Mar 2020 16:15)      SUBJECTIVE / OVERNIGHT EVENTS:   Patient was complaining of abdominal pain with some associated fevers   CT abdomen done today shows mildly dilated loops of large bowel, surgery was made aware and recommendations were discussed     MEDICATIONS  (STANDING):  amLODIPine   Tablet 5 milliGRAM(s) Oral daily  atorvastatin 20 milliGRAM(s) Oral at bedtime  capsaicin HP 0.075% Cream 1 Application(s) Topical every 6 hours  dextrose 5%. 1000 milliLiter(s) (50 mL/Hr) IV Continuous <Continuous>  dextrose 50% Injectable 12.5 Gram(s) IV Push once  dextrose 50% Injectable 25 Gram(s) IV Push once  dextrose 50% Injectable 25 Gram(s) IV Push once  insulin lispro (HumaLOG) corrective regimen sliding scale   SubCutaneous three times a day before meals  insulin lispro (HumaLOG) corrective regimen sliding scale   SubCutaneous at bedtime  lidocaine   Patch 1 Patch Transdermal daily  multivitamin 1 Tablet(s) Oral daily  pantoprazole    Tablet 40 milliGRAM(s) Oral before breakfast  polyethylene glycol 3350 17 Gram(s) Oral daily  rivaroxaban 20 milliGRAM(s) Oral with dinner  senna 2 Tablet(s) Oral at bedtime    MEDICATIONS  (PRN):  acetaminophen   Tablet .. 650 milliGRAM(s) Oral every 6 hours PRN Temp greater or equal to 38.5C (101.3F), Mild Pain (1 - 3), Moderate Pain (4 - 6)  acetaminophen  Suppository .. 650 milliGRAM(s) Rectal every 4 hours PRN Temp greater or equal to 38.5C (101.3F)  bisacodyl Suppository 10 milliGRAM(s) Rectal daily PRN Constipation  dextrose 40% Gel 15 Gram(s) Oral once PRN Blood Glucose LESS THAN 70 milliGRAM(s)/deciliter  glucagon  Injectable 1 milliGRAM(s) IntraMuscular once PRN Glucose LESS THAN 70 milligrams/deciliter  phenazopyridine 200 milliGRAM(s) Oral every 8 hours PRN urinary symptoms      T(C): 37.5 (03-31-20 @ 07:34), Max: 38.5 (03-31-20 @ 05:20)  HR: 99 (03-31-20 @ 05:20) (98 - 99)  BP: 138/80 (03-31-20 @ 05:20) (138/76 - 138/80)  RR: 21 (03-31-20 @ 05:20) (20 - 21)  SpO2: 96% (03-31-20 @ 05:20) (96% - 98%)  CAPILLARY BLOOD GLUCOSE      POCT Blood Glucose.: 117 mg/dL (31 Mar 2020 12:38)  POCT Blood Glucose.: 127 mg/dL (31 Mar 2020 08:51)  POCT Blood Glucose.: 182 mg/dL (30 Mar 2020 21:48)  POCT Blood Glucose.: 120 mg/dL (30 Mar 2020 17:11)    I&O's Summary    30 Mar 2020 07:01  -  31 Mar 2020 07:00  --------------------------------------------------------  IN: 480 mL / OUT: 850 mL / NET: -370 mL    31 Mar 2020 07:01  -  31 Mar 2020 14:41  --------------------------------------------------------  IN: 0 mL / OUT: 300 mL / NET: -300 mL        PHYSICAL EXAM:  GENERAL: not in distress, on room air  EYES: open, sclera clear b/l  CHEST/LUNG: normal respiratory effort, not tachypneic, speaking in full sentences without difficulty  HEART: Not tachycardic, no lower extremity edema b/l  ABDOMEN: + distended with mild tenderness of the lower quadrants   EXTREMITIES: Warm and well perfused  NEUROLOGY: awake, alert, responds to Qs appropriately, no gross deficits  PSYCH: calm, cooperative  SKIN: No visible rashes or lesions    LABS:                        12.8   4.21  )-----------( 160      ( 31 Mar 2020 07:04 )             39.3     03-31    139  |  103  |  9   ----------------------------<  122<H>  3.2<L>   |  24  |  0.66    Ca    9.1      31 Mar 2020 07:04    TPro  6.8  /  Alb  3.8  /  TBili  0.3  /  DBili  x   /  AST  23  /  ALT  24  /  AlkPhos  152<H>  03-31        RADIOLOGY & ADDITIONAL TESTS: CT Abdomen   Trace bilateral pleural effusions and minimal bibasilar atelectasis.   No bowel obstruction. Mildly distended loops of large bowel.   Right adnexal cystic lesion measuring up to 3.0 cm, slightly increased since prior exam 2/24/2019.

## 2020-04-01 DIAGNOSIS — B34.2 CORONAVIRUS INFECTION, UNSPECIFIED: ICD-10-CM

## 2020-04-01 DIAGNOSIS — R14.0 ABDOMINAL DISTENSION (GASEOUS): ICD-10-CM

## 2020-04-01 LAB
ALBUMIN SERPL ELPH-MCNC: 3.7 G/DL — SIGNIFICANT CHANGE UP (ref 3.3–5)
ALP SERPL-CCNC: 138 U/L — HIGH (ref 40–120)
ALT FLD-CCNC: 24 U/L — SIGNIFICANT CHANGE UP (ref 4–33)
ANION GAP SERPL CALC-SCNC: 15 MMO/L — HIGH (ref 7–14)
AST SERPL-CCNC: 25 U/L — SIGNIFICANT CHANGE UP (ref 4–32)
BASOPHILS # BLD AUTO: 0.03 K/UL — SIGNIFICANT CHANGE UP (ref 0–0.2)
BASOPHILS NFR BLD AUTO: 0.7 % — SIGNIFICANT CHANGE UP (ref 0–2)
BILIRUB SERPL-MCNC: 0.3 MG/DL — SIGNIFICANT CHANGE UP (ref 0.2–1.2)
BUN SERPL-MCNC: 9 MG/DL — SIGNIFICANT CHANGE UP (ref 7–23)
CALCIUM SERPL-MCNC: 9.3 MG/DL — SIGNIFICANT CHANGE UP (ref 8.4–10.5)
CHLORIDE SERPL-SCNC: 102 MMOL/L — SIGNIFICANT CHANGE UP (ref 98–107)
CO2 SERPL-SCNC: 23 MMOL/L — SIGNIFICANT CHANGE UP (ref 22–31)
CREAT SERPL-MCNC: 0.67 MG/DL — SIGNIFICANT CHANGE UP (ref 0.5–1.3)
CRP SERPL-MCNC: 65.9 MG/L — HIGH
D DIMER BLD IA.RAPID-MCNC: 185 NG/ML — SIGNIFICANT CHANGE UP
EOSINOPHIL # BLD AUTO: 0 K/UL — SIGNIFICANT CHANGE UP (ref 0–0.5)
EOSINOPHIL NFR BLD AUTO: 0 % — SIGNIFICANT CHANGE UP (ref 0–6)
FERRITIN SERPL-MCNC: 84.3 NG/ML — SIGNIFICANT CHANGE UP (ref 15–150)
GLUCOSE SERPL-MCNC: 114 MG/DL — HIGH (ref 70–99)
HCT VFR BLD CALC: 40.3 % — SIGNIFICANT CHANGE UP (ref 34.5–45)
HGB BLD-MCNC: 12.8 G/DL — SIGNIFICANT CHANGE UP (ref 11.5–15.5)
IMM GRANULOCYTES NFR BLD AUTO: 0.7 % — SIGNIFICANT CHANGE UP (ref 0–1.5)
LDH SERPL L TO P-CCNC: 184 U/L — SIGNIFICANT CHANGE UP (ref 135–225)
LDH SERPL L TO P-CCNC: 186 U/L — SIGNIFICANT CHANGE UP (ref 135–225)
LYMPHOCYTES # BLD AUTO: 1.33 K/UL — SIGNIFICANT CHANGE UP (ref 1–3.3)
LYMPHOCYTES # BLD AUTO: 29.8 % — SIGNIFICANT CHANGE UP (ref 13–44)
MAGNESIUM SERPL-MCNC: 2.3 MG/DL — SIGNIFICANT CHANGE UP (ref 1.6–2.6)
MCHC RBC-ENTMCNC: 24.1 PG — LOW (ref 27–34)
MCHC RBC-ENTMCNC: 31.8 % — LOW (ref 32–36)
MCV RBC AUTO: 75.9 FL — LOW (ref 80–100)
MONOCYTES # BLD AUTO: 0.32 K/UL — SIGNIFICANT CHANGE UP (ref 0–0.9)
MONOCYTES NFR BLD AUTO: 7.2 % — SIGNIFICANT CHANGE UP (ref 2–14)
NEUTROPHILS # BLD AUTO: 2.76 K/UL — SIGNIFICANT CHANGE UP (ref 1.8–7.4)
NEUTROPHILS NFR BLD AUTO: 61.6 % — SIGNIFICANT CHANGE UP (ref 43–77)
NRBC # FLD: 0 K/UL — SIGNIFICANT CHANGE UP (ref 0–0)
PHOSPHATE SERPL-MCNC: 3.5 MG/DL — SIGNIFICANT CHANGE UP (ref 2.5–4.5)
PLATELET # BLD AUTO: 132 K/UL — LOW (ref 150–400)
PMV BLD: 10.5 FL — SIGNIFICANT CHANGE UP (ref 7–13)
POTASSIUM SERPL-MCNC: 3.5 MMOL/L — SIGNIFICANT CHANGE UP (ref 3.5–5.3)
POTASSIUM SERPL-SCNC: 3.5 MMOL/L — SIGNIFICANT CHANGE UP (ref 3.5–5.3)
PROCALCITONIN SERPL-MCNC: 0.05 NG/ML — SIGNIFICANT CHANGE UP (ref 0.02–0.1)
PROCALCITONIN SERPL-MCNC: 0.06 NG/ML — SIGNIFICANT CHANGE UP (ref 0.02–0.1)
PROT SERPL-MCNC: 6.8 G/DL — SIGNIFICANT CHANGE UP (ref 6–8.3)
RBC # BLD: 5.31 M/UL — HIGH (ref 3.8–5.2)
RBC # FLD: 14.6 % — HIGH (ref 10.3–14.5)
SODIUM SERPL-SCNC: 140 MMOL/L — SIGNIFICANT CHANGE UP (ref 135–145)
WBC # BLD: 4.47 K/UL — SIGNIFICANT CHANGE UP (ref 3.8–10.5)
WBC # FLD AUTO: 4.47 K/UL — SIGNIFICANT CHANGE UP (ref 3.8–10.5)

## 2020-04-01 PROCEDURE — 99232 SBSQ HOSP IP/OBS MODERATE 35: CPT | Mod: CS

## 2020-04-01 RX ORDER — ACETAMINOPHEN 500 MG
650 TABLET ORAL ONCE
Refills: 0 | Status: COMPLETED | OUTPATIENT
Start: 2020-04-01 | End: 2020-04-01

## 2020-04-01 RX ORDER — POTASSIUM CHLORIDE 20 MEQ
20 PACKET (EA) ORAL ONCE
Refills: 0 | Status: COMPLETED | OUTPATIENT
Start: 2020-04-01 | End: 2020-04-01

## 2020-04-01 RX ADMIN — PANTOPRAZOLE SODIUM 40 MILLIGRAM(S): 20 TABLET, DELAYED RELEASE ORAL at 07:25

## 2020-04-01 RX ADMIN — LIDOCAINE 1 PATCH: 4 CREAM TOPICAL at 18:50

## 2020-04-01 RX ADMIN — RIVAROXABAN 20 MILLIGRAM(S): KIT at 18:43

## 2020-04-01 RX ADMIN — Medication 1 APPLICATION(S): at 11:37

## 2020-04-01 RX ADMIN — Medication 1 APPLICATION(S): at 22:39

## 2020-04-01 RX ADMIN — Medication 1 APPLICATION(S): at 07:25

## 2020-04-01 RX ADMIN — LIDOCAINE 1 PATCH: 4 CREAM TOPICAL at 01:24

## 2020-04-01 RX ADMIN — SENNA PLUS 2 TABLET(S): 8.6 TABLET ORAL at 22:39

## 2020-04-01 RX ADMIN — Medication 20 MILLIEQUIVALENT(S): at 18:48

## 2020-04-01 RX ADMIN — AMLODIPINE BESYLATE 5 MILLIGRAM(S): 2.5 TABLET ORAL at 07:25

## 2020-04-01 RX ADMIN — ATORVASTATIN CALCIUM 20 MILLIGRAM(S): 80 TABLET, FILM COATED ORAL at 22:39

## 2020-04-01 RX ADMIN — Medication 1 TABLET(S): at 11:38

## 2020-04-01 RX ADMIN — Medication 650 MILLIGRAM(S): at 01:48

## 2020-04-01 RX ADMIN — Medication 1 APPLICATION(S): at 18:43

## 2020-04-01 RX ADMIN — LIDOCAINE 1 PATCH: 4 CREAM TOPICAL at 22:38

## 2020-04-01 RX ADMIN — LIDOCAINE 1 PATCH: 4 CREAM TOPICAL at 11:37

## 2020-04-01 RX ADMIN — Medication 650 MILLIGRAM(S): at 22:39

## 2020-04-01 NOTE — PROVIDER CONTACT NOTE (OTHER) - ASSESSMENT
Patient A&Ox4. Patient complaining of R neck pain, irradiating to R jaw and temple. VS stable, afebrile. No droopiness to face, or change in speech noted, patient's smile remains symmetric.

## 2020-04-01 NOTE — PROGRESS NOTE ADULT - SUBJECTIVE AND OBJECTIVE BOX
Patient is a 74y old  Female who presents with a chief complaint of fall, rule-out covid (31 Mar 2020 14:41)      SUBJECTIVE / OVERNIGHT EVENTS: Afebrile overnight. Denies chest pain, SOB. Had formed BMs after receiving suppository. Abd pain improved, no N/V    MEDICATIONS  (STANDING):  amLODIPine   Tablet 5 milliGRAM(s) Oral daily  atorvastatin 20 milliGRAM(s) Oral at bedtime  capsaicin HP 0.075% Cream 1 Application(s) Topical every 6 hours  dextrose 5%. 1000 milliLiter(s) (50 mL/Hr) IV Continuous <Continuous>  dextrose 50% Injectable 12.5 Gram(s) IV Push once  dextrose 50% Injectable 25 Gram(s) IV Push once  dextrose 50% Injectable 25 Gram(s) IV Push once  insulin lispro (HumaLOG) corrective regimen sliding scale   SubCutaneous three times a day before meals  insulin lispro (HumaLOG) corrective regimen sliding scale   SubCutaneous at bedtime  lidocaine   Patch 1 Patch Transdermal daily  multivitamin 1 Tablet(s) Oral daily  pantoprazole    Tablet 40 milliGRAM(s) Oral before breakfast  polyethylene glycol 3350 17 Gram(s) Oral daily  potassium chloride    Tablet ER 20 milliEquivalent(s) Oral once  rivaroxaban 20 milliGRAM(s) Oral with dinner  senna 2 Tablet(s) Oral at bedtime    MEDICATIONS  (PRN):  acetaminophen   Tablet .. 650 milliGRAM(s) Oral every 6 hours PRN Temp greater or equal to 38.5C (101.3F), Mild Pain (1 - 3), Moderate Pain (4 - 6)  acetaminophen  Suppository .. 650 milliGRAM(s) Rectal every 4 hours PRN Temp greater or equal to 38.5C (101.3F)  bisacodyl Suppository 10 milliGRAM(s) Rectal daily PRN Constipation  dextrose 40% Gel 15 Gram(s) Oral once PRN Blood Glucose LESS THAN 70 milliGRAM(s)/deciliter  glucagon  Injectable 1 milliGRAM(s) IntraMuscular once PRN Glucose LESS THAN 70 milligrams/deciliter  phenazopyridine 200 milliGRAM(s) Oral every 8 hours PRN urinary symptoms      T(C): 37.6 (04-01-20 @ 11:00), Max: 37.6 (03-31-20 @ 17:32)  HR: 94 (04-01-20 @ 11:00) (85 - 104)  BP: 127/77 (04-01-20 @ 11:00) (126/75 - 159/96)  RR: 20 (04-01-20 @ 11:00) (20 - 21)  SpO2: 96% (04-01-20 @ 11:00) (96% - 97%)  CAPILLARY BLOOD GLUCOSE      POCT Blood Glucose.: 115 mg/dL (01 Apr 2020 12:25)  POCT Blood Glucose.: 111 mg/dL (01 Apr 2020 09:12)  POCT Blood Glucose.: 126 mg/dL (31 Mar 2020 22:32)  POCT Blood Glucose.: 139 mg/dL (31 Mar 2020 17:16)    I&O's Summary    31 Mar 2020 07:01  -  01 Apr 2020 07:00  --------------------------------------------------------  IN: 720 mL / OUT: 1000 mL / NET: -280 mL    01 Apr 2020 07:01  -  01 Apr 2020 15:42  --------------------------------------------------------  IN: 240 mL / OUT: 300 mL / NET: -60 mL        PHYSICAL EXAM:  GENERAL: not in distress, on room air  EYES: open, sclera clear b/l  CHEST/LUNG: normal respiratory effort, not tachypneic, speaking in full sentences without difficulty  HEART: no lower extremity edema b/l  ABDOMEN: Soft, Nontender, Nondistended  EXTREMITIES: Warm and well perfused  NEUROLOGY: awake, alert, responds to Qs appropriately, no gross deficits  PSYCH: calm, cooperative  SKIN: No visible rashes or lesions    LABS:                        12.8   4.47  )-----------( 132      ( 01 Apr 2020 07:20 )             40.3     04-01    140  |  102  |  9   ----------------------------<  114<H>  3.5   |  23  |  0.67    Ca    9.3      01 Apr 2020 07:20  Phos  3.5     04-01  Mg     2.3     04-01    TPro  6.8  /  Alb  3.7  /  TBili  0.3  /  DBili  x   /  AST  25  /  ALT  24  /  AlkPhos  138<H>  04-01              RADIOLOGY & ADDITIONAL TESTS:

## 2020-04-02 ENCOUNTER — TRANSCRIPTION ENCOUNTER (OUTPATIENT)
Age: 75
End: 2020-04-02

## 2020-04-02 DIAGNOSIS — H92.01 OTALGIA, RIGHT EAR: ICD-10-CM

## 2020-04-02 LAB
ALBUMIN SERPL ELPH-MCNC: 3.7 G/DL — SIGNIFICANT CHANGE UP (ref 3.3–5)
ALP SERPL-CCNC: 129 U/L — HIGH (ref 40–120)
ALT FLD-CCNC: 25 U/L — SIGNIFICANT CHANGE UP (ref 4–33)
ANION GAP SERPL CALC-SCNC: 13 MMO/L — SIGNIFICANT CHANGE UP (ref 7–14)
AST SERPL-CCNC: 29 U/L — SIGNIFICANT CHANGE UP (ref 4–32)
BASOPHILS # BLD AUTO: 0.02 K/UL — SIGNIFICANT CHANGE UP (ref 0–0.2)
BASOPHILS NFR BLD AUTO: 0.4 % — SIGNIFICANT CHANGE UP (ref 0–2)
BILIRUB SERPL-MCNC: 0.3 MG/DL — SIGNIFICANT CHANGE UP (ref 0.2–1.2)
BUN SERPL-MCNC: 8 MG/DL — SIGNIFICANT CHANGE UP (ref 7–23)
CALCIUM SERPL-MCNC: 9.2 MG/DL — SIGNIFICANT CHANGE UP (ref 8.4–10.5)
CHLORIDE SERPL-SCNC: 97 MMOL/L — LOW (ref 98–107)
CO2 SERPL-SCNC: 23 MMOL/L — SIGNIFICANT CHANGE UP (ref 22–31)
CREAT SERPL-MCNC: 0.56 MG/DL — SIGNIFICANT CHANGE UP (ref 0.5–1.3)
EOSINOPHIL # BLD AUTO: 0 K/UL — SIGNIFICANT CHANGE UP (ref 0–0.5)
EOSINOPHIL NFR BLD AUTO: 0 % — SIGNIFICANT CHANGE UP (ref 0–6)
GLUCOSE SERPL-MCNC: 115 MG/DL — HIGH (ref 70–99)
HCT VFR BLD CALC: 41.7 % — SIGNIFICANT CHANGE UP (ref 34.5–45)
HGB BLD-MCNC: 13.6 G/DL — SIGNIFICANT CHANGE UP (ref 11.5–15.5)
IMM GRANULOCYTES NFR BLD AUTO: 0.4 % — SIGNIFICANT CHANGE UP (ref 0–1.5)
LYMPHOCYTES # BLD AUTO: 1.15 K/UL — SIGNIFICANT CHANGE UP (ref 1–3.3)
LYMPHOCYTES # BLD AUTO: 25.8 % — SIGNIFICANT CHANGE UP (ref 13–44)
MAGNESIUM SERPL-MCNC: 2.2 MG/DL — SIGNIFICANT CHANGE UP (ref 1.6–2.6)
MCHC RBC-ENTMCNC: 24.5 PG — LOW (ref 27–34)
MCHC RBC-ENTMCNC: 32.6 % — SIGNIFICANT CHANGE UP (ref 32–36)
MCV RBC AUTO: 75 FL — LOW (ref 80–100)
MONOCYTES # BLD AUTO: 0.31 K/UL — SIGNIFICANT CHANGE UP (ref 0–0.9)
MONOCYTES NFR BLD AUTO: 7 % — SIGNIFICANT CHANGE UP (ref 2–14)
NEUTROPHILS # BLD AUTO: 2.96 K/UL — SIGNIFICANT CHANGE UP (ref 1.8–7.4)
NEUTROPHILS NFR BLD AUTO: 66.4 % — SIGNIFICANT CHANGE UP (ref 43–77)
NRBC # FLD: 0 K/UL — SIGNIFICANT CHANGE UP (ref 0–0)
PHOSPHATE SERPL-MCNC: 2.6 MG/DL — SIGNIFICANT CHANGE UP (ref 2.5–4.5)
PLATELET # BLD AUTO: 138 K/UL — LOW (ref 150–400)
PMV BLD: 10.5 FL — SIGNIFICANT CHANGE UP (ref 7–13)
POTASSIUM SERPL-MCNC: 3.4 MMOL/L — LOW (ref 3.5–5.3)
POTASSIUM SERPL-SCNC: 3.4 MMOL/L — LOW (ref 3.5–5.3)
PROT SERPL-MCNC: 7.1 G/DL — SIGNIFICANT CHANGE UP (ref 6–8.3)
RBC # BLD: 5.56 M/UL — HIGH (ref 3.8–5.2)
RBC # FLD: 14.6 % — HIGH (ref 10.3–14.5)
SODIUM SERPL-SCNC: 133 MMOL/L — LOW (ref 135–145)
WBC # BLD: 4.46 K/UL — SIGNIFICANT CHANGE UP (ref 3.8–10.5)
WBC # FLD AUTO: 4.46 K/UL — SIGNIFICANT CHANGE UP (ref 3.8–10.5)

## 2020-04-02 PROCEDURE — 99232 SBSQ HOSP IP/OBS MODERATE 35: CPT | Mod: CS

## 2020-04-02 RX ORDER — CIPROFLOXACIN AND DEXAMETHASONE 3; 1 MG/ML; MG/ML
4 SUSPENSION/ DROPS AURICULAR (OTIC)
Refills: 0 | Status: DISCONTINUED | OUTPATIENT
Start: 2020-04-02 | End: 2020-04-03

## 2020-04-02 RX ORDER — ACETAMINOPHEN 500 MG
2 TABLET ORAL
Qty: 0 | Refills: 0 | DISCHARGE

## 2020-04-02 RX ORDER — RIVAROXABAN 15 MG-20MG
1 KIT ORAL
Qty: 0 | Refills: 0 | DISCHARGE

## 2020-04-02 RX ORDER — SENNA PLUS 8.6 MG/1
2 TABLET ORAL
Qty: 0 | Refills: 0 | DISCHARGE
Start: 2020-04-02

## 2020-04-02 RX ORDER — POTASSIUM CHLORIDE 20 MEQ
40 PACKET (EA) ORAL ONCE
Refills: 0 | Status: COMPLETED | OUTPATIENT
Start: 2020-04-02 | End: 2020-04-02

## 2020-04-02 RX ORDER — ACETAMINOPHEN 500 MG
2 TABLET ORAL
Qty: 0 | Refills: 0 | DISCHARGE
Start: 2020-04-02

## 2020-04-02 RX ORDER — CIPROFLOXACIN AND DEXAMETHASONE 3; 1 MG/ML; MG/ML
4 SUSPENSION/ DROPS AURICULAR (OTIC)
Qty: 0 | Refills: 0 | DISCHARGE
Start: 2020-04-02

## 2020-04-02 RX ORDER — ACETAMINOPHEN 500 MG
650 TABLET ORAL EVERY 6 HOURS
Refills: 0 | Status: DISCONTINUED | OUTPATIENT
Start: 2020-04-02 | End: 2020-04-03

## 2020-04-02 RX ORDER — POLYETHYLENE GLYCOL 3350 17 G/17G
17 POWDER, FOR SOLUTION ORAL
Qty: 0 | Refills: 0 | DISCHARGE
Start: 2020-04-02

## 2020-04-02 RX ORDER — AMLODIPINE BESYLATE 2.5 MG/1
1 TABLET ORAL
Qty: 0 | Refills: 0 | DISCHARGE

## 2020-04-02 RX ORDER — DICLOFENAC SODIUM 75 MG/1
1 TABLET, DELAYED RELEASE ORAL
Qty: 0 | Refills: 0 | DISCHARGE

## 2020-04-02 RX ORDER — PSEUDOEPHEDRINE HCL 30 MG
1 TABLET ORAL
Qty: 0 | Refills: 0 | DISCHARGE

## 2020-04-02 RX ORDER — LIDOCAINE 4 G/100G
1 CREAM TOPICAL
Qty: 0 | Refills: 0 | DISCHARGE
Start: 2020-04-02

## 2020-04-02 RX ORDER — HYDROCORTISONE 20 MG
1 TABLET ORAL
Qty: 0 | Refills: 0 | DISCHARGE

## 2020-04-02 RX ORDER — CIPROFLOXACIN AND FLUOCINOLONE ACETONIDE .75; .0625 MG/.25ML; MG/.25ML
0.25 SOLUTION AURICULAR (OTIC)
Refills: 0 | Status: DISCONTINUED | OUTPATIENT
Start: 2020-04-02 | End: 2020-04-02

## 2020-04-02 RX ORDER — ALBUTEROL 90 UG/1
2 AEROSOL, METERED ORAL EVERY 6 HOURS
Refills: 0 | Status: DISCONTINUED | OUTPATIENT
Start: 2020-04-02 | End: 2020-04-03

## 2020-04-02 RX ORDER — ALBUTEROL 90 UG/1
2 AEROSOL, METERED ORAL
Qty: 0 | Refills: 0 | DISCHARGE
Start: 2020-04-02

## 2020-04-02 RX ADMIN — Medication 1 APPLICATION(S): at 11:04

## 2020-04-02 RX ADMIN — LIDOCAINE 1 PATCH: 4 CREAM TOPICAL at 11:04

## 2020-04-02 RX ADMIN — RIVAROXABAN 20 MILLIGRAM(S): KIT at 18:12

## 2020-04-02 RX ADMIN — Medication 1 APPLICATION(S): at 07:09

## 2020-04-02 RX ADMIN — LIDOCAINE 1 PATCH: 4 CREAM TOPICAL at 18:39

## 2020-04-02 RX ADMIN — Medication 1 TABLET(S): at 11:04

## 2020-04-02 RX ADMIN — Medication 40 MILLIEQUIVALENT(S): at 11:03

## 2020-04-02 RX ADMIN — LIDOCAINE 1 PATCH: 4 CREAM TOPICAL at 23:43

## 2020-04-02 RX ADMIN — POLYETHYLENE GLYCOL 3350 17 GRAM(S): 17 POWDER, FOR SOLUTION ORAL at 11:04

## 2020-04-02 RX ADMIN — CIPROFLOXACIN AND DEXAMETHASONE 4 DROP(S): 3; 1 SUSPENSION/ DROPS AURICULAR (OTIC) at 18:04

## 2020-04-02 RX ADMIN — Medication 650 MILLIGRAM(S): at 11:03

## 2020-04-02 RX ADMIN — Medication 1 APPLICATION(S): at 18:04

## 2020-04-02 RX ADMIN — AMLODIPINE BESYLATE 5 MILLIGRAM(S): 2.5 TABLET ORAL at 07:09

## 2020-04-02 RX ADMIN — PANTOPRAZOLE SODIUM 40 MILLIGRAM(S): 20 TABLET, DELAYED RELEASE ORAL at 07:09

## 2020-04-02 NOTE — PROGRESS NOTE ADULT - SUBJECTIVE AND OBJECTIVE BOX
Patient is a 74y old  Female who presents with a chief complaint of fall, rule-out covid (01 Apr 2020 15:42)      SUBJECTIVE / OVERNIGHT EVENTS: Tmax 102.1 overnight. Reports right ear sharp pain that radiates into right cheek. Denies any itchiness or fluid from ears. Has some pain with food. Has no dentures    MEDICATIONS  (STANDING):  amLODIPine   Tablet 5 milliGRAM(s) Oral daily  atorvastatin 20 milliGRAM(s) Oral at bedtime  capsaicin HP 0.075% Cream 1 Application(s) Topical every 6 hours  ciprofloxacin/fluocinolone Otic Solution 0.25 milliLiter(s) Right Ear two times a day  dextrose 5%. 1000 milliLiter(s) (50 mL/Hr) IV Continuous <Continuous>  dextrose 50% Injectable 12.5 Gram(s) IV Push once  dextrose 50% Injectable 25 Gram(s) IV Push once  dextrose 50% Injectable 25 Gram(s) IV Push once  insulin lispro (HumaLOG) corrective regimen sliding scale   SubCutaneous three times a day before meals  insulin lispro (HumaLOG) corrective regimen sliding scale   SubCutaneous at bedtime  lidocaine   Patch 1 Patch Transdermal daily  multivitamin 1 Tablet(s) Oral daily  pantoprazole    Tablet 40 milliGRAM(s) Oral before breakfast  polyethylene glycol 3350 17 Gram(s) Oral daily  rivaroxaban 20 milliGRAM(s) Oral with dinner  senna 2 Tablet(s) Oral at bedtime    MEDICATIONS  (PRN):  acetaminophen   Tablet .. 650 milliGRAM(s) Oral every 6 hours PRN Temp greater or equal to 38C (100.4F), Mild Pain (1 - 3), Moderate Pain (4 - 6)  acetaminophen  Suppository .. 650 milliGRAM(s) Rectal every 4 hours PRN Temp greater or equal to 38.5C (101.3F)  ALBUTerol    90 MICROgram(s) HFA Inhaler 2 Puff(s) Inhalation every 6 hours PRN Shortness of Breath and/or Wheezing  bisacodyl Suppository 10 milliGRAM(s) Rectal daily PRN Constipation  dextrose 40% Gel 15 Gram(s) Oral once PRN Blood Glucose LESS THAN 70 milliGRAM(s)/deciliter  glucagon  Injectable 1 milliGRAM(s) IntraMuscular once PRN Glucose LESS THAN 70 milligrams/deciliter  phenazopyridine 200 milliGRAM(s) Oral every 8 hours PRN urinary symptoms      T(C): 38.2 (04-02-20 @ 09:58), Max: 38.9 (04-01-20 @ 21:35)  HR: 102 (04-02-20 @ 09:58) (102 - 107)  BP: 129/80 (04-02-20 @ 09:58) (129/80 - 134/79)  RR: 20 (04-02-20 @ 09:58) (20 - 21)  SpO2: 94% (04-02-20 @ 09:58) (94% - 95%)  CAPILLARY BLOOD GLUCOSE      POCT Blood Glucose.: 108 mg/dL (02 Apr 2020 08:45)  POCT Blood Glucose.: 126 mg/dL (01 Apr 2020 22:36)    I&O's Summary    01 Apr 2020 07:01  -  02 Apr 2020 07:00  --------------------------------------------------------  IN: 1320 mL / OUT: 1200 mL / NET: 120 mL        PHYSICAL EXAM:  GENERAL: not in distress, on room air  EYES: open, sclera clear b/l  EARS: ear exam limited without an otoscope, no overlying erythema but TTP over right mastoid process  MOUTH: no lesions, edentulous  CHEST/LUNG: normal respiratory effort, not tachypneic, speaking in full sentences without difficulty  HEART: no lower extremity edema b/l  ABDOMEN: Soft, Nontender, Nondistended  EXTREMITIES: Warm and well perfused  NEUROLOGY: awake, alert, responds to Qs appropriately, no gross deficits  PSYCH: calm, cooperative  SKIN: No visible rashes or lesions    LABS:                        13.6   4.46  )-----------( 138      ( 02 Apr 2020 06:00 )             41.7     04-02    133<L>  |  97<L>  |  8   ----------------------------<  115<H>  3.4<L>   |  23  |  0.56    Ca    9.2      02 Apr 2020 06:00  Phos  2.6     04-02  Mg     2.2     04-02    TPro  7.1  /  Alb  3.7  /  TBili  0.3  /  DBili  x   /  AST  29  /  ALT  25  /  AlkPhos  129<H>  04-02              RADIOLOGY & ADDITIONAL TESTS:

## 2020-04-02 NOTE — DISCHARGE NOTE PROVIDER - HOSPITAL COURSE
74F with HTN, HLD, T2DM, DVT presented with knee pain after fall at home. Also with low grade fever and found to be COVID positive. Course c/b abdominal distention/pain improved after bowel movement and right ear pain. Patient currently on room air saturating well, no acute respiratory distress. Patient is medically stable fr discharge to rehab.        Coronavirus infection      -no respiratory symptoms, clinically stable      -CXR clear. COVID19 PCR POSITIVE (3/29)     -Prone positioning while awake and asleep as tolerated if hypoxia    -Avoid HFNC, Nebulized treatments, and NIPPV with BIPAP/CPAP.    -Avoid NSAIDs. Use Tylenol PRN for fever, pain instead    -Albuterol and atrovent HFA PRN    -CRP/ CPK elevated     -Advance Care Planning and Code Status: DNR/DNI, MOLST in chart        Ear pain, right.      -? infection vs TMJ dysfunction    -Trial with cipro otic drops x 7 days (4/2- )    -switched diet to soft as patient without dentures and is edentulous.         Severe Abdominal pain    -resolved    -CT A/P (3/31)- trace B/L pleural effusions, minimal atelactasis. No bowel obstruction. Mildly distended loops of large bowel. Right adexal cystic lesion 3.0cm,increased from 2/24/19.     -Bowel regimen started. BM x3 on 3/31 s/p suppository. Formed stool.    -Surgery team curbsided- no intervention, bowel regimen. I    -advanced diet from clears to regular         Acute pain of both knees      -due to fall    -XR w/o acute pathology     -tylenol prn for pain    -d/c'd Diclofenac 50 mg BID --> Capsaicin cream ordered 3/30      -PT recs- rehab          Essential hypertension. blood pressure controlled. c/w Norvasc 5 mg.     Other hyperlipidemia. c/w Lipitor    Type 2 diabetes mellitus without complication  A1C 6.5.  FS, ISS        Dispo: rehab, DNR/DNI        On 4/2/2020, case was discussed with Dr. Randle, patient is medically cleared and optimized for discharge today. All medications were reviewed with attending. 74F with HTN, HLD, T2DM, DVT presented with knee pain after fall at home. Also with low grade fever and found to be COVID positive. Course c/b abdominal distention/pain improved after bowel movement and right ear pain. Patient currently on room air saturating well, no acute respiratory distress. Patient is medically stable fr discharge to rehab.        Coronavirus infection      -no respiratory symptoms, clinically stable      -CXR clear. COVID19 PCR POSITIVE (3/29)     -Prone positioning while awake and asleep as tolerated if hypoxia    -Avoid HFNC, Nebulized treatments, and NIPPV with BIPAP/CPAP.    -Avoid NSAIDs. Use Tylenol PRN for fever, pain instead    -Albuterol and atrovent HFA PRN    -CRP/ CPK elevated     -Advance Care Planning and Code Status: DNR/DNI, MOLST in chart        Ear pain, right.      -? infection vs TMJ dysfunction    -Trial with cipro otic drops x 7 days (4/2-9)    -switched diet to soft as patient without dentures and is edentulous.         Severe Abdominal pain    -resolved    -CT A/P (3/31)- trace B/L pleural effusions, minimal atelactasis. No bowel obstruction. Mildly distended loops of large bowel. Right adexal cystic lesion 3.0cm,increased from 2/24/19.     -Bowel regimen started. BM x3 on 3/31 s/p suppository. Formed stool.    -Surgery team curbsided- no intervention, bowel regimen. I    -advanced diet from clears to regular         Acute pain of both knees      -due to fall    -XR w/o acute pathology     -tylenol prn for pain    -d/c'd Diclofenac 50 mg BID --> Capsaicin cream ordered 3/30      -PT recs- rehab          Essential hypertension. blood pressure controlled. c/w Norvasc 5 mg.     Other hyperlipidemia. c/w Lipitor    Type 2 diabetes mellitus without complication  A1C 6.5.  FS, ISS        Dispo: rehab, DNR/DNI        On 4/2/2020, case was discussed with Dr. Randle, patient is medically cleared and optimized for discharge today. All medications were reviewed with attending. 74F with HTN, HLD, T2DM, DVT presented with knee pain after fall at home. Also with low grade fever and found to be COVID positive. Course c/b abdominal distention/pain improved after bowel movement and right ear pain. Patient currently on room air saturating well, no acute respiratory distress. Patient is medically stable fr discharge to rehab.        Coronavirus infection      -no respiratory symptoms, clinically stable      -CXR clear. COVID19 PCR POSITIVE (3/29)     -Prone positioning while awake and asleep as tolerated if hypoxia    -Avoid HFNC, Nebulized treatments, and NIPPV with BIPAP/CPAP.    -Avoid NSAIDs. Use Tylenol PRN for fever, pain instead    -Albuterol and atrovent HFA PRN    -CRP/ CPK elevated     -Advance Care Planning and Code Status: DNR/DNI, MOLST in chart        Ear pain, right.      -? infection vs TMJ dysfunction    -Trial with cipro otic drops x 7 days (4/2-9)    -switched diet to soft as patient without dentures and is edentulous.         Severe Abdominal pain    -resolved    -CT A/P (3/31)- trace B/L pleural effusions, minimal atelactasis. No bowel obstruction. Mildly distended loops of large bowel. Right adexal cystic lesion 3.0cm,increased from 2/24/19.     -Bowel regimen started. BM x3 on 3/31 s/p suppository. Formed stool.    -Surgery team curbsided- no intervention, bowel regimen. I    -advanced diet from clears to regular         Acute pain of both knees      -due to fall    -XR w/o acute pathology     -tylenol prn for pain    -d/c'd Diclofenac 50 mg BID --> Capsaicin cream ordered 3/30      -PT recs- rehab          Essential hypertension. blood pressure controlled. c/w Norvasc 5 mg.     Other hyperlipidemia. c/w Lipitor    Type 2 diabetes mellitus without complication  A1C 6.5.  FS, ISS        Dispo: rehab, DNR/DNI        Briefly returned to [facility] as patient refused to get off stretcher. DIscharged back to NH on 4/3.     On 4/2/2020, case was discussed with Dr. Bonilla, patient is medically cleared and optimized for discharge today. All medications were reviewed with attending.

## 2020-04-02 NOTE — DISCHARGE NOTE PROVIDER - NSDCFUADDINST_GEN_ALL_CORE_FT
Patient should remain in isolation for a total of 14 days from time of discharge. Patient was diagnosed on 3/29/2020 and would be expected to complete isolation on or after 4/12/2020.    Those caring for the patient should maintain strict hand hygiene and avoid touching face as much as possible. If any family members develop shortness of breath or fevers they should contact their primary care provider as soon as possible.cov

## 2020-04-02 NOTE — PROGRESS NOTE ADULT - ATTENDING COMMENTS
Dispo: stable for DC to rehab if tolerating regular diet. DC time: 33 min    Diagnoses: 1. COVID 19 infection 2. right ear pain  Level 2: 38100

## 2020-04-02 NOTE — DISCHARGE NOTE PROVIDER - CARE PROVIDER_API CALL
Folow up with PCP,   repeat CBC, BMP, LFTs in 1-2 weeks  Phone: (   )    -  Fax: (   )    -  Follow Up Time:

## 2020-04-02 NOTE — DISCHARGE NOTE PROVIDER - NSDCCPCAREPLAN_GEN_ALL_CORE_FT
PRINCIPAL DISCHARGE DIAGNOSIS  Diagnosis: Headache  Assessment and Plan of Treatment: likely related to acute infection, resolved      SECONDARY DISCHARGE DIAGNOSES  Diagnosis: Infection due to 2019 novel coronavirus  Assessment and Plan of Treatment: You were found to be positive COVID 19 virus (diagnosed on 3/29/20). Please follow full instructions listed in your paperwork. Please self quarantine at home for 14 days from discharge and stay 6 feet away minimum from other individuals or animals. Do not go to work, school, public areas, or use public transportaion. Restrict outside activity except for  medical care. Please call ahead if you have an appointment with your doctor to inform them of your COVID positive status. Always wear a facemask at home. Cover your sneezes and coughs, and wash hands with soap and water for at least 20 seconds frequently. Avoid sharing personal household items. Clean all surfaces where you contact frequently such as coutners and doorknobs frequently.   If you have any worsening breathing, faster breathing or trouble with breathing call 911 immediately or your healthcare provider ahead of time and wear facemask before being seen by medical personel.   Take tylenol for your fevers. Please follow state and local rules and regulations regarding coming off of isolation.    Diagnosis: Fever  Assessment and Plan of Treatment: resolving. take tylenol as needed    Diagnosis: Ear pain, right  Assessment and Plan of Treatment: Continue with ear drops as prescribed for 7days (started on 4/2/2020)    Diagnosis: Knee pain  Assessment and Plan of Treatment: Likely related to fall, xray with no acute findings. Continue with lidacaine patch and topical ointment as prescribed. Maintain a safe environment. Do not change positions quickly. Particpate in program recommended by physical therapy    Diagnosis: Abdominal distention  Assessment and Plan of Treatment: Resolved after having bowel movement. you may continue with bowel regimen as prescribed    Diagnosis: DVT (deep venous thrombosis)  Assessment and Plan of Treatment: chronic dvt, continue with xarelto as prescribed    Diagnosis: Type 2 diabetes mellitus without complication, without long-term current use of insulin  Assessment and Plan of Treatment: A1C 6.5%. Continue your medication regimen and a consistent carbohydrate diet (Meaning eating the same amount of carbohydrates at the same time each day). Monitor blood glucose levels throughout the day before meals and at bedtime. Record blood sugars and bring to outpatient providers appointment in order to be reviewed by your doctor for management modifications. If your sugars are more than 400 or less than 70 you should contact your PCP immediately. Monitor for signs/symptoms of low blood glucose, such as, dizziness, altered mental status, or cool/clammy skin. In addition, monitor for signs/symptoms of high blood glucose, such as, feeling hot, dry, fatigued, or with increased thirst/urination. Make regular podiatry appointments in order to have feet checked for wounds and uncontrolled toe nail growth to prevent infections, as well as, appointments with an ophthalmologist to monitor your vision.    Diagnosis: Essential hypertension  Assessment and Plan of Treatment: Stable Continue blood pressure medication regimen as directed. Monitor for any visual changes, headaches or dizziness.  Monitor blood pressure regularly.  Follow up with your primary care provider for further management for high blood pressure. PRINCIPAL DISCHARGE DIAGNOSIS  Diagnosis: Headache  Assessment and Plan of Treatment: likely related to acute infection, resolved      SECONDARY DISCHARGE DIAGNOSES  Diagnosis: Infection due to 2019 novel coronavirus  Assessment and Plan of Treatment: You were found to be positive COVID 19 virus (diagnosed on 3/29/20). Please follow full instructions listed in your paperwork. Please self quarantine at home for 14 days from discharge and stay 6 feet away minimum from other individuals or animals. Do not go to work, school, public areas, or use public transportaion. Restrict outside activity except for  medical care. Please call ahead if you have an appointment with your doctor to inform them of your COVID positive status. Always wear a facemask at home. Cover your sneezes and coughs, and wash hands with soap and water for at least 20 seconds frequently. Avoid sharing personal household items. Clean all surfaces where you contact frequently such as coutners and doorknobs frequently.   If you have any worsening breathing, faster breathing or trouble with breathing call 911 immediately or your healthcare provider ahead of time and wear facemask before being seen by medical personel.   Take tylenol for your fevers. Please follow state and local rules and regulations regarding coming off of isolation.    Diagnosis: Fever  Assessment and Plan of Treatment: resolving. take tylenol as needed    Diagnosis: Ear pain, right  Assessment and Plan of Treatment: Continue with ear drops as prescribed for 7days (started on 4/2/2020)    Diagnosis: Knee pain  Assessment and Plan of Treatment: Likely related to fall, xray with no acute findings. Continue with lidacaine patch and topical ointment as prescribed. Holding Volteran and hydrocortisone for now. Maintain a safe environment. Do not change positions quickly. Particpate in program recommended by physical therapy    Diagnosis: Abdominal distention  Assessment and Plan of Treatment: Resolved after having bowel movement. you may continue with bowel regimen as prescribed    Diagnosis: DVT (deep venous thrombosis)  Assessment and Plan of Treatment: chronic dvt, continue with xarelto as prescribed    Diagnosis: Type 2 diabetes mellitus without complication, without long-term current use of insulin  Assessment and Plan of Treatment: A1C 6.5%. Continue your medication regimen and a consistent carbohydrate diet (Meaning eating the same amount of carbohydrates at the same time each day). Monitor blood glucose levels throughout the day before meals and at bedtime. Record blood sugars and bring to outpatient providers appointment in order to be reviewed by your doctor for management modifications. If your sugars are more than 400 or less than 70 you should contact your PCP immediately. Monitor for signs/symptoms of low blood glucose, such as, dizziness, altered mental status, or cool/clammy skin. In addition, monitor for signs/symptoms of high blood glucose, such as, feeling hot, dry, fatigued, or with increased thirst/urination. Make regular podiatry appointments in order to have feet checked for wounds and uncontrolled toe nail growth to prevent infections, as well as, appointments with an ophthalmologist to monitor your vision.    Diagnosis: Essential hypertension  Assessment and Plan of Treatment: Stable Continue blood pressure medication regimen as directed. Monitor for any visual changes, headaches or dizziness.  Monitor blood pressure regularly.  Follow up with your primary care provider for further management for high blood pressure. PRINCIPAL DISCHARGE DIAGNOSIS  Diagnosis: Infection due to 2019 novel coronavirus  Assessment and Plan of Treatment: You were found to be positive COVID 19 virus (diagnosed on 3/29/20). Please follow full instructions listed in your paperwork. Please self quarantine at home for 14 days from discharge and stay 6 feet away minimum from other individuals or animals. Do not go to work, school, public areas, or use public transportaion. Restrict outside activity except for  medical care. Please call ahead if you have an appointment with your doctor to inform them of your COVID positive status. Always wear a facemask at home. Cover your sneezes and coughs, and wash hands with soap and water for at least 20 seconds frequently. Avoid sharing personal household items. Clean all surfaces where you contact frequently such as coutners and doorknobs frequently.   If you have any worsening breathing, faster breathing or trouble with breathing call 911 immediately or your healthcare provider ahead of time and wear facemask before being seen by medical personel.   Take tylenol for your fevers. Please follow state and local rules and regulations regarding coming off of isolation.      SECONDARY DISCHARGE DIAGNOSES  Diagnosis: DVT (deep venous thrombosis)  Assessment and Plan of Treatment: chronic dvt, continue with xarelto as prescribed    Diagnosis: Essential hypertension  Assessment and Plan of Treatment: Stable Continue blood pressure medication regimen as directed. Monitor for any visual changes, headaches or dizziness.  Monitor blood pressure regularly.  Follow up with your primary care provider for further management for high blood pressure.    Diagnosis: Type 2 diabetes mellitus without complication, without long-term current use of insulin  Assessment and Plan of Treatment: A1C 6.5%. Continue your medication regimen and a consistent carbohydrate diet (Meaning eating the same amount of carbohydrates at the same time each day). Monitor blood glucose levels throughout the day before meals and at bedtime. Record blood sugars and bring to outpatient providers appointment in order to be reviewed by your doctor for management modifications. If your sugars are more than 400 or less than 70 you should contact your PCP immediately. Monitor for signs/symptoms of low blood glucose, such as, dizziness, altered mental status, or cool/clammy skin. In addition, monitor for signs/symptoms of high blood glucose, such as, feeling hot, dry, fatigued, or with increased thirst/urination. Make regular podiatry appointments in order to have feet checked for wounds and uncontrolled toe nail growth to prevent infections, as well as, appointments with an ophthalmologist to monitor your vision.    Diagnosis: Abdominal distention  Assessment and Plan of Treatment: Resolved after having bowel movement. you may continue with bowel regimen as prescribed    Diagnosis: Ear pain, right  Assessment and Plan of Treatment: Continue with ear drops as prescribed for 7days (started on 4/2/2020)    Diagnosis: Fever  Assessment and Plan of Treatment: resolving. take tylenol as needed    Diagnosis: Knee pain  Assessment and Plan of Treatment: Likely related to fall, xray with no acute findings. Continue with lidacaine patch and topical ointment as prescribed. Holding Volteran and hydrocortisone for now. Maintain a safe environment. Do not change positions quickly. Particpate in program recommended by physical therapy    Diagnosis: Type 2 diabetes mellitus without complication, without long-term current use of insulin  Assessment and Plan of Treatment: A1C 6.5%. Continue your medication regimen and a consistent carbohydrate diet (Meaning eating the same amount of carbohydrates at the same time each day). Monitor blood glucose levels throughout the day before meals and at bedtime. Record blood sugars and bring to outpatient providers appointment in order to be reviewed by your doctor for management modifications. If your sugars are more than 400 or less than 70 you should contact your PCP immediately. Monitor for signs/symptoms of low blood glucose, such as, dizziness, altered mental status, or cool/clammy skin. In addition, monitor for signs/symptoms of high blood glucose, such as, feeling hot, dry, fatigued, or with increased thirst/urination. Make regular podiatry appointments in order to have feet checked for wounds and uncontrolled toe nail growth to prevent infections, as well as, appointments with an ophthalmologist to monitor your vision.

## 2020-04-02 NOTE — DISCHARGE NOTE PROVIDER - PROVIDER TOKENS
FREE:[LAST:[Folow up with PCP],PHONE:[(   )    -],FAX:[(   )    -],ADDRESS:[repeat CBC, BMP, LFTs in 1-2 weeks]]

## 2020-04-02 NOTE — DISCHARGE NOTE PROVIDER - NSDCMRMEDTOKEN_GEN_ALL_CORE_FT
acetaminophen 325 mg oral tablet: 2 tab(s) orally every 6 hours, As needed, Temp greater or equal to 38C (100.4F), Mild Pain (1 - 3), Moderate Pain (4 - 6)  albuterol 90 mcg/inh inhalation aerosol: 2 puff(s) inhaled every 6 hours, As needed, Shortness of Breath and/or Wheezing  amLODIPine 5 mg oral tablet: 1 tab(s) orally once a day  hold for SBP&lt;110  ciprofloxacin-dexamethasone 0.3%-0.1% otic suspension: 4 drop(s) to each affected ear 2 times a day for 7days (started on 4/2/2020)  diclofenac 1% topical gel: Apply topically to affected area 4 times a day  lidocaine 5% topical film: Apply topically to affected area once a day, On for 12hours, Off for 12hours.  Right Hip pain  Lipitor 20 mg oral tablet: 1 tab(s) orally once a day  metFORMIN 1000 mg oral tablet, extended release: 1 tab(s) orally once a day  Multiple Vitamins oral tablet: 1 tab(s) orally once a day  Myrbetriq 50 mg oral tablet, extended release: 1 tab(s) orally once a day  omeprazole 40 mg oral delayed release capsule: 1 cap(s) orally once a day  phenazopyridine 200 mg oral tablet: 1 tab(s) orally 3 times a day (after meals), As Needed  polyethylene glycol 3350 oral powder for reconstitution: 17 gram(s) orally once a day  senna oral tablet: 2 tab(s) orally once a day (at bedtime)  Xarelto 20 mg oral tablet: 1 tab(s) orally once a day (in the evening with dinner)

## 2020-04-02 NOTE — DISCHARGE NOTE NURSING/CASE MANAGEMENT/SOCIAL WORK - PATIENT PORTAL LINK FT
You can access the FollowMyHealth Patient Portal offered by [facility] by registering at the following website: [email or web address]. By joining [employer]’s FollowMyHealth portal, you will also be able to view your health information using other applications (apps) compatible with our system.

## 2020-04-03 VITALS
TEMPERATURE: 99 F | SYSTOLIC BLOOD PRESSURE: 133 MMHG | OXYGEN SATURATION: 98 % | HEART RATE: 110 BPM | DIASTOLIC BLOOD PRESSURE: 88 MMHG | RESPIRATION RATE: 18 BRPM

## 2020-04-03 LAB
ALBUMIN SERPL ELPH-MCNC: 3.6 G/DL — SIGNIFICANT CHANGE UP (ref 3.3–5)
ALP SERPL-CCNC: 118 U/L — SIGNIFICANT CHANGE UP (ref 40–120)
ALT FLD-CCNC: 32 U/L — SIGNIFICANT CHANGE UP (ref 4–33)
ANION GAP SERPL CALC-SCNC: 14 MMO/L — SIGNIFICANT CHANGE UP (ref 7–14)
AST SERPL-CCNC: 31 U/L — SIGNIFICANT CHANGE UP (ref 4–32)
BASOPHILS # BLD AUTO: 0.02 K/UL — SIGNIFICANT CHANGE UP (ref 0–0.2)
BASOPHILS NFR BLD AUTO: 0.5 % — SIGNIFICANT CHANGE UP (ref 0–2)
BILIRUB SERPL-MCNC: 0.3 MG/DL — SIGNIFICANT CHANGE UP (ref 0.2–1.2)
BUN SERPL-MCNC: 14 MG/DL — SIGNIFICANT CHANGE UP (ref 7–23)
CALCIUM SERPL-MCNC: 9.7 MG/DL — SIGNIFICANT CHANGE UP (ref 8.4–10.5)
CHLORIDE SERPL-SCNC: 102 MMOL/L — SIGNIFICANT CHANGE UP (ref 98–107)
CO2 SERPL-SCNC: 21 MMOL/L — LOW (ref 22–31)
CREAT SERPL-MCNC: 0.7 MG/DL — SIGNIFICANT CHANGE UP (ref 0.5–1.3)
D DIMER BLD IA.RAPID-MCNC: < 150 NG/ML — SIGNIFICANT CHANGE UP
EOSINOPHIL # BLD AUTO: 0 K/UL — SIGNIFICANT CHANGE UP (ref 0–0.5)
EOSINOPHIL NFR BLD AUTO: 0 % — SIGNIFICANT CHANGE UP (ref 0–6)
GLUCOSE SERPL-MCNC: 96 MG/DL — SIGNIFICANT CHANGE UP (ref 70–99)
HCT VFR BLD CALC: 42.4 % — SIGNIFICANT CHANGE UP (ref 34.5–45)
HGB BLD-MCNC: 13.8 G/DL — SIGNIFICANT CHANGE UP (ref 11.5–15.5)
IMM GRANULOCYTES NFR BLD AUTO: 0.3 % — SIGNIFICANT CHANGE UP (ref 0–1.5)
LYMPHOCYTES # BLD AUTO: 1.25 K/UL — SIGNIFICANT CHANGE UP (ref 1–3.3)
LYMPHOCYTES # BLD AUTO: 34 % — SIGNIFICANT CHANGE UP (ref 13–44)
MCHC RBC-ENTMCNC: 24.3 PG — LOW (ref 27–34)
MCHC RBC-ENTMCNC: 32.5 % — SIGNIFICANT CHANGE UP (ref 32–36)
MCV RBC AUTO: 74.8 FL — LOW (ref 80–100)
MONOCYTES # BLD AUTO: 0.28 K/UL — SIGNIFICANT CHANGE UP (ref 0–0.9)
MONOCYTES NFR BLD AUTO: 7.6 % — SIGNIFICANT CHANGE UP (ref 2–14)
NEUTROPHILS # BLD AUTO: 2.12 K/UL — SIGNIFICANT CHANGE UP (ref 1.8–7.4)
NEUTROPHILS NFR BLD AUTO: 57.6 % — SIGNIFICANT CHANGE UP (ref 43–77)
NRBC # FLD: 0 K/UL — SIGNIFICANT CHANGE UP (ref 0–0)
PLATELET # BLD AUTO: 150 K/UL — SIGNIFICANT CHANGE UP (ref 150–400)
PMV BLD: 11.1 FL — SIGNIFICANT CHANGE UP (ref 7–13)
POTASSIUM SERPL-MCNC: 3.9 MMOL/L — SIGNIFICANT CHANGE UP (ref 3.5–5.3)
POTASSIUM SERPL-SCNC: 3.9 MMOL/L — SIGNIFICANT CHANGE UP (ref 3.5–5.3)
PROT SERPL-MCNC: 7.2 G/DL — SIGNIFICANT CHANGE UP (ref 6–8.3)
RBC # BLD: 5.67 M/UL — HIGH (ref 3.8–5.2)
RBC # FLD: 14.8 % — HIGH (ref 10.3–14.5)
SODIUM SERPL-SCNC: 137 MMOL/L — SIGNIFICANT CHANGE UP (ref 135–145)
WBC # BLD: 3.68 K/UL — LOW (ref 3.8–10.5)
WBC # FLD AUTO: 3.68 K/UL — LOW (ref 3.8–10.5)

## 2020-04-03 RX ADMIN — Medication 1 APPLICATION(S): at 12:17

## 2020-04-03 RX ADMIN — AMLODIPINE BESYLATE 5 MILLIGRAM(S): 2.5 TABLET ORAL at 05:54

## 2020-04-03 RX ADMIN — PANTOPRAZOLE SODIUM 40 MILLIGRAM(S): 20 TABLET, DELAYED RELEASE ORAL at 05:54

## 2020-04-03 RX ADMIN — Medication 1 APPLICATION(S): at 00:14

## 2020-04-03 RX ADMIN — Medication 1 TABLET(S): at 12:17

## 2020-04-03 RX ADMIN — Medication 650 MILLIGRAM(S): at 00:14

## 2020-04-03 RX ADMIN — CIPROFLOXACIN AND DEXAMETHASONE 4 DROP(S): 3; 1 SUSPENSION/ DROPS AURICULAR (OTIC) at 05:55

## 2020-04-03 RX ADMIN — Medication 1 APPLICATION(S): at 05:54

## 2020-04-03 RX ADMIN — POLYETHYLENE GLYCOL 3350 17 GRAM(S): 17 POWDER, FOR SOLUTION ORAL at 12:17

## 2020-04-03 RX ADMIN — LIDOCAINE 1 PATCH: 4 CREAM TOPICAL at 12:18

## 2020-04-03 RX ADMIN — ATORVASTATIN CALCIUM 20 MILLIGRAM(S): 80 TABLET, FILM COATED ORAL at 00:14

## 2020-04-03 NOTE — PROGRESS NOTE ADULT - PROBLEM SELECTOR PLAN 6
DVT PPx: on Xarelto     PT eval: full eval pending patient was evaluated today
ISS with meal.   A1c 6.5%

## 2020-04-03 NOTE — PROGRESS NOTE ADULT - PROBLEM SELECTOR PROBLEM 4
Other hyperlipidemia
DVT (deep venous thrombosis)

## 2020-04-03 NOTE — PROGRESS NOTE ADULT - PROBLEM SELECTOR PROBLEM 6
Prophylactic measure
Type 2 diabetes mellitus without complication, without long-term current use of insulin

## 2020-04-03 NOTE — PROGRESS NOTE ADULT - PROBLEM SELECTOR PLAN 5
- on Xarelto for prior h/o VTE
- on Xarelto for prior h/o VTE
- on Xarelto for prior h/o VTE   - e GFR 88
- on Xarelto for prior h/o VTE
ISS with meal.   A1c 6.5%

## 2020-04-03 NOTE — PROVIDER CONTACT NOTE (OTHER) - ASSESSMENT
Patient A&Ox4. Pt febrile at 101.8. remaining Vital Signs Stable. pt with no IV access per previous d/c

## 2020-04-03 NOTE — PROGRESS NOTE ADULT - PROBLEM SELECTOR PLAN 3
- blood pressure controlled   - c/w Norvasc 5 mg
- c/w Lipitor

## 2020-04-03 NOTE — PROVIDER CONTACT NOTE (OTHER) - ACTION/TREATMENT ORDERED:
Tylenol PO 650mg given. Will continue to monitor. administer 650mg tylenol PO per order. Recheck 2 am. cont with current orders. ok to keep IV out per d/c order. will follow up with am team/ social work re; d/c planning.

## 2020-04-03 NOTE — PROVIDER CONTACT NOTE (OTHER) - SITUATION
pt discharged per day shift paperwork completed and left with EMS/ rehab did not accept pt/ pt returned with EMS to ED per social work, pt febrile in ED. pt readmitted to floor.

## 2020-04-03 NOTE — PROGRESS NOTE ADULT - PROBLEM SELECTOR PLAN 7
DVT PPx: on Xarelto     PT eval: full eval pending patient was evaluated today
DVT PPx: on Xarelto   PT recs: rehab

## 2020-04-03 NOTE — PROGRESS NOTE ADULT - PROBLEM SELECTOR PROBLEM 5
DVT (deep venous thrombosis)
Type 2 diabetes mellitus without complication, without long-term current use of insulin

## 2020-04-03 NOTE — PROVIDER CONTACT NOTE (OTHER) - RECOMMENDATIONS
MD notified. administer tylenol PO per order. Recheck 2 am. cont with current orders. ok to keep IV out per d/c order. will follow up with am team/ social work re; d/c planning. MD notified. administer tylenol PO per order? Recheck temp 2 am? cont with current orders? ok to keep IV out per previous d/c order?

## 2020-04-03 NOTE — PROGRESS NOTE ADULT - PROBLEM SELECTOR PROBLEM 3
Essential hypertension
Other hyperlipidemia

## 2020-04-03 NOTE — PROGRESS NOTE ADULT - PROBLEM SELECTOR PLAN 2
COVID19 PCR result: positive  Saturating well on RA  Advance Care Planning and Code Status: DNR/DNI, MOLST in chart  Prone positioning while awake and asleep as tolerated if hypoxia  Avoid HFNC, Nebulized treatments, and NIPPV with BIPAP/CPAP.  Avoid NSAIDs. Use Tylenol PRN for fever, pain instead  Albuterol and atrovent HFA PRN  ICU consult for early intubation if worsening hypoxia on NRB, increased work of breathing and inability to maintain airway
likely due to constipation now improved after having BMs  - CT Abdomen showed dilated loops of bowel   - tolerated clears, advance diet as tolerated   If abdominal pain worsens and patient with N/V, then reconsult surgery team.  Serial abdominal exams
likely due to constipation vs colitis   - CT Abdomen obtain showed dilated loops of bowel   - surgery made aware and recommendations to start clear liquids diet, strict bowel regimen. If abdominal pain worsens and patient with N/V, then reconsult surgery team.  - will monitor
COVID19 PCR result: positive  Saturating well on RA  Advance Care Planning and Code Status: DNR/DNI, MOLST in chart  Prone positioning while awake and asleep as tolerated if hypoxia  Avoid HFNC, Nebulized treatments, and NIPPV with BIPAP/CPAP.  Avoid NSAIDs. Use Tylenol PRN for fever, pain instead  Albuterol and atrovent HFA PRN  ICU consult for early intubation if worsening hypoxia on NRB, increased work of breathing and inability to maintain airway  - patient will have fever due to infection
- blood pressure controlled   - c/w Norvasc 5 mg

## 2020-04-03 NOTE — PROGRESS NOTE ADULT - REASON FOR ADMISSION
fall, rule-out covid

## 2020-04-03 NOTE — PROGRESS NOTE ADULT - PROBLEM SELECTOR PLAN 1
- clinically stable   - patient does not qualify for Hydroxychlorquine / Azithro due to no need for supplemental oxygen (saturating >94% on RA)   - continue with strict isolation   - o2 supplemental PRN   - continue to monitor resp status   - CRP/ CPK elevated
? infection vs TMJ dysfunction  Trial with cipro otic drops x 7 days  switch diet to soft as patient without dentures and is edentulous
COVID19 PCR result: positive  Saturating well on RA  Advance Care Planning and Code Status: DNR/DNI, MOLST in chart  Prone positioning while awake and asleep as tolerated if hypoxia  Avoid HFNC, Nebulized treatments, and NIPPV with BIPAP/CPAP.  Avoid NSAIDs. Use Tylenol PRN for fever, pain instead  Albuterol and atrovent HFA PRN  ICU consult for early intubation if worsening hypoxia on NRB, increased work of breathing and inability to maintain airway
? infection vs TMJ dysfunction  Trial with cipro otic drops x 7 days  switch diet to soft as patient without dentures and is edentulous
- clinically stable   - patient does not qualify for Hydroxychlorquine / Azithro due to no need for supplemental oxygen (saturating 99% on RA)   - continue with strict isolation   - o2 supplemental PRN   - continue to monitor resp status   - CRP/ CPK elevated

## 2020-04-03 NOTE — PROGRESS NOTE ADULT - PROBLEM SELECTOR PROBLEM 1
Coronavirus infection
Coronavirus infection
Ear pain, right
Infection due to 2019 novel coronavirus
Ear pain, right

## 2020-04-03 NOTE — PROGRESS NOTE ADULT - PROBLEM SELECTOR PROBLEM 2
Abdominal distention
Infection due to 2019 novel coronavirus
R/O Abdominal distention
Infection due to 2019 novel coronavirus
Essential hypertension

## 2020-04-03 NOTE — PROGRESS NOTE ADULT - SUBJECTIVE AND OBJECTIVE BOX
Patient is a 74y old  Female who presents with a chief complaint of fall, rule-out covid (02 Apr 2020 17:11)      SUBJECTIVE / OVERNIGHT EVENTS: No acute events overnight.    MEDICATIONS  (STANDING):  amLODIPine   Tablet 5 milliGRAM(s) Oral daily  atorvastatin 20 milliGRAM(s) Oral at bedtime  capsaicin HP 0.075% Cream 1 Application(s) Topical every 6 hours  ciprofloxacin/dexamethasone Suspension Otic 4 Drop(s) Right Ear two times a day  dextrose 5%. 1000 milliLiter(s) (50 mL/Hr) IV Continuous <Continuous>  dextrose 50% Injectable 12.5 Gram(s) IV Push once  dextrose 50% Injectable 25 Gram(s) IV Push once  dextrose 50% Injectable 25 Gram(s) IV Push once  insulin lispro (HumaLOG) corrective regimen sliding scale   SubCutaneous three times a day before meals  insulin lispro (HumaLOG) corrective regimen sliding scale   SubCutaneous at bedtime  lidocaine   Patch 1 Patch Transdermal daily  multivitamin 1 Tablet(s) Oral daily  pantoprazole    Tablet 40 milliGRAM(s) Oral before breakfast  polyethylene glycol 3350 17 Gram(s) Oral daily  rivaroxaban 20 milliGRAM(s) Oral with dinner  senna 2 Tablet(s) Oral at bedtime    MEDICATIONS  (PRN):  acetaminophen   Tablet .. 650 milliGRAM(s) Oral every 6 hours PRN Temp greater or equal to 38C (100.4F), Mild Pain (1 - 3), Moderate Pain (4 - 6)  ALBUTerol    90 MICROgram(s) HFA Inhaler 2 Puff(s) Inhalation every 6 hours PRN Shortness of Breath and/or Wheezing  bisacodyl Suppository 10 milliGRAM(s) Rectal daily PRN Constipation  dextrose 40% Gel 15 Gram(s) Oral once PRN Blood Glucose LESS THAN 70 milliGRAM(s)/deciliter  glucagon  Injectable 1 milliGRAM(s) IntraMuscular once PRN Glucose LESS THAN 70 milligrams/deciliter  phenazopyridine 200 milliGRAM(s) Oral every 8 hours PRN urinary symptoms      T(C): 37.4 (04-03-20 @ 10:16), Max: 38.8 (04-02-20 @ 22:44)  HR: 110 (04-03-20 @ 10:16) (84 - 115)  BP: 133/88 (04-03-20 @ 10:16) (115/69 - 149/93)  RR: 18 (04-03-20 @ 10:16) (18 - 20)  SpO2: 98% (04-03-20 @ 10:16) (94% - 100%)  CAPILLARY BLOOD GLUCOSE      POCT Blood Glucose.: 113 mg/dL (03 Apr 2020 08:47)  POCT Blood Glucose.: 107 mg/dL (02 Apr 2020 23:42)  POCT Blood Glucose.: 123 mg/dL (02 Apr 2020 17:11)    I&O's Summary    02 Apr 2020 07:01  -  03 Apr 2020 07:00  --------------------------------------------------------  IN: 720 mL / OUT: 1050 mL / NET: -330 mL    03 Apr 2020 07:01  -  03 Apr 2020 12:34  --------------------------------------------------------  IN: 0 mL / OUT: 200 mL / NET: -200 mL        PHYSICAL EXAM:  GENERAL: not in distress, on room air  EYES: open, sclera clear b/l  CHEST/LUNG: normal respiratory effort, not tachypneic, speaking in full sentences without difficulty  HEART: Not tachycardic, no lower extremity edema b/l  ABDOMEN: Soft, Nontender, Nondistended  EXTREMITIES: Warm and well perfused  NEUROLOGY: awake, alert, responds to Qs appropriately, no gross deficits  PSYCH: calm, cooperative  SKIN: No visible rashes or lesions    LABS:                        13.8   3.68  )-----------( 150      ( 03 Apr 2020 06:30 )             42.4     04-03    137  |  102  |  14  ----------------------------<  96  3.9   |  21<L>  |  0.70    Ca    9.7      03 Apr 2020 06:30  Phos  2.6     04-02  Mg     2.2     04-02    TPro  7.2  /  Alb  3.6  /  TBili  0.3  /  DBili  x   /  AST  31  /  ALT  32  /  AlkPhos  118  04-03              RADIOLOGY & ADDITIONAL TESTS: Patient is a 74y old  Female who presents with a chief complaint of fall, rule-out covid (02 Apr 2020 17:11)      SUBJECTIVE / OVERNIGHT EVENTS:  Patient was transferred to the NH and then brought back due to the fact patient did not want to get out of the bed with fever 101.4; patient otherwise is unchanged with no complaints and no acute decompensation in respiratory status     MEDICATIONS  (STANDING):  amLODIPine   Tablet 5 milliGRAM(s) Oral daily  atorvastatin 20 milliGRAM(s) Oral at bedtime  capsaicin HP 0.075% Cream 1 Application(s) Topical every 6 hours  ciprofloxacin/dexamethasone Suspension Otic 4 Drop(s) Right Ear two times a day  dextrose 5%. 1000 milliLiter(s) (50 mL/Hr) IV Continuous <Continuous>  dextrose 50% Injectable 12.5 Gram(s) IV Push once  dextrose 50% Injectable 25 Gram(s) IV Push once  dextrose 50% Injectable 25 Gram(s) IV Push once  insulin lispro (HumaLOG) corrective regimen sliding scale   SubCutaneous three times a day before meals  insulin lispro (HumaLOG) corrective regimen sliding scale   SubCutaneous at bedtime  lidocaine   Patch 1 Patch Transdermal daily  multivitamin 1 Tablet(s) Oral daily  pantoprazole    Tablet 40 milliGRAM(s) Oral before breakfast  polyethylene glycol 3350 17 Gram(s) Oral daily  rivaroxaban 20 milliGRAM(s) Oral with dinner  senna 2 Tablet(s) Oral at bedtime    MEDICATIONS  (PRN):  acetaminophen   Tablet .. 650 milliGRAM(s) Oral every 6 hours PRN Temp greater or equal to 38C (100.4F), Mild Pain (1 - 3), Moderate Pain (4 - 6)  ALBUTerol    90 MICROgram(s) HFA Inhaler 2 Puff(s) Inhalation every 6 hours PRN Shortness of Breath and/or Wheezing  bisacodyl Suppository 10 milliGRAM(s) Rectal daily PRN Constipation  dextrose 40% Gel 15 Gram(s) Oral once PRN Blood Glucose LESS THAN 70 milliGRAM(s)/deciliter  glucagon  Injectable 1 milliGRAM(s) IntraMuscular once PRN Glucose LESS THAN 70 milligrams/deciliter  phenazopyridine 200 milliGRAM(s) Oral every 8 hours PRN urinary symptoms      T(C): 37.4 (04-03-20 @ 10:16), Max: 38.8 (04-02-20 @ 22:44)  HR: 110 (04-03-20 @ 10:16) (84 - 115)  BP: 133/88 (04-03-20 @ 10:16) (115/69 - 149/93)  RR: 18 (04-03-20 @ 10:16) (18 - 20)  SpO2: 98% (04-03-20 @ 10:16) (94% - 100%)  CAPILLARY BLOOD GLUCOSE      POCT Blood Glucose.: 113 mg/dL (03 Apr 2020 08:47)  POCT Blood Glucose.: 107 mg/dL (02 Apr 2020 23:42)  POCT Blood Glucose.: 123 mg/dL (02 Apr 2020 17:11)    I&O's Summary    02 Apr 2020 07:01  -  03 Apr 2020 07:00  --------------------------------------------------------  IN: 720 mL / OUT: 1050 mL / NET: -330 mL    03 Apr 2020 07:01  -  03 Apr 2020 12:34  --------------------------------------------------------  IN: 0 mL / OUT: 200 mL / NET: -200 mL        PHYSICAL EXAM:  GENERAL: not in distress, on room air  EYES: open, sclera clear b/l  CHEST/LUNG: normal respiratory effort, not tachypneic, speaking in full sentences without difficulty  HEART: Not tachycardic, no lower extremity edema b/l  ABDOMEN: Soft, Nontender, Nondistended  EXTREMITIES: Warm and well perfused  NEUROLOGY: awake, alert, responds to Qs appropriately, no gross deficits  PSYCH: calm, cooperative  SKIN: No visible rashes or lesions    LABS:                        13.8   3.68  )-----------( 150      ( 03 Apr 2020 06:30 )             42.4     04-03    137  |  102  |  14  ----------------------------<  96  3.9   |  21<L>  |  0.70    Ca    9.7      03 Apr 2020 06:30  Phos  2.6     04-02  Mg     2.2     04-02    TPro  7.2  /  Alb  3.6  /  TBili  0.3  /  DBili  x   /  AST  31  /  ALT  32  /  AlkPhos  118  04-03              RADIOLOGY & ADDITIONAL TESTS:

## 2020-04-03 NOTE — PROGRESS NOTE ADULT - ASSESSMENT
74F with HTN, HLD, T2DM, DVT presented with knee pain after fall at home. low grade fever COVID positive
74F with HTN, HLD, T2DM, DVT presented with knee pain after fall at home. Also with low grade fever and found to be COVID positive. Course c/b abdominal distention/pain improved after bowel movement
74F with HTN, HLD, T2DM, DVT presented with knee pain after fall at home. Also with low grade fever and found to be COVID positive. Course c/b abdominal distention/pain improved after bowel movement and right ear pain
74F with HTN, HLD, T2DM, DVT presented with knee pain after fall at home. low grade fever COVID positive
74F with HTN, HLD, T2DM, DVT presented with knee pain after fall at home. Also with low grade fever and found to be COVID positive. Course c/b abdominal distention/pain improved after bowel movement and right ear pain

## 2021-03-05 NOTE — ED PROVIDER NOTE - NSCAREINITIATED _GEN_ER
----- Message from PATO Young CST  To: HOANG Somers Np Result Pool      
Called patient and left detailed message explaining below recommendation and plan. Patient instructed to call back if there are any further questions.  
Lexx Escalera(Attending)
numerical 0-10

## 2021-04-05 NOTE — ED ADULT NURSE NOTE - PRIMARY CARE PROVIDER
Pt just had shoulder surgery and needs pain med - they gave her Rx for steroid but wants to see if it is ok to take first - also asking what pain med would be best for her to take with her osteoperosis MD Reynoso

## 2021-07-01 NOTE — PATIENT PROFILE ADULT. - FUNCTIONAL SCREEN CURRENT LEVEL: DRESSING, MLM
(2) assistive person O-Z Plasty Text: The defect edges were debeveled with a #15 scalpel blade.  Given the location of the defect, shape of the defect and the proximity to free margins an O-Z plasty (double transposition flap) was deemed most appropriate.  Using a sterile surgical marker, the appropriate transposition flaps were drawn incorporating the defect and placing the expected incisions within the relaxed skin tension lines where possible.    The area thus outlined was incised deep to adipose tissue with a #15 scalpel blade.  The skin margins were undermined to an appropriate distance in all directions utilizing iris scissors.  Hemostasis was achieved with electrocautery.  The flaps were then transposed into place, one clockwise and the other counterclockwise, and anchored with interrupted buried subcutaneous sutures.

## 2021-07-09 NOTE — H&P ADULT. - GENITOURINARY
Notified Dr. Ronel Santamaria of patients pain, he arrived to unit and saw her. No new orders. details…
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
## 2022-01-28 NOTE — ED ADULT TRIAGE NOTE - NS ED NURSE DIRECT TO ROOM YN
Detail Level: Detailed No Quality 130: Documentation Of Current Medications In The Medical Record: Current Medications Documented Quality 110: Preventive Care And Screening: Influenza Immunization: Influenza Immunization Administered during Influenza season

## 2022-03-01 NOTE — ED ADULT TRIAGE NOTE - AS TEMP SITE
Time-based billing (NON-critical care) Time-based billing (NON-critical care) Time-based billing (NON-critical care) Time-based billing (NON-critical care) Time-based billing (NON-critical care) Time-based billing (NON-critical care) Time-based billing (NON-critical care) Time-based billing (NON-critical care) oral

## 2022-03-11 NOTE — PHYSICAL THERAPY INITIAL EVALUATION ADULT - SITTING BALANCE: STATIC
Baby is active, no bleeding, LOF  Occasional contractions at night  Ready for IOL - would like to be scheduled  If not admitted today then to be scheduled  Okay for pitocin only - as is 3cm and third baby  normal balance

## 2022-04-25 NOTE — ED ADULT TRIAGE NOTE - BP NONINVASIVE SYSTOLIC (MM HG)
, Misael, returned phone call to provider.  Misael is available after 1:30pm today @ phone number [phone]   146

## 2022-05-31 ENCOUNTER — OUTPATIENT (OUTPATIENT)
Dept: OUTPATIENT SERVICES | Facility: HOSPITAL | Age: 77
LOS: 1 days | Discharge: HOME | End: 2022-05-31

## 2022-05-31 ENCOUNTER — NON-APPOINTMENT (OUTPATIENT)
Age: 77
End: 2022-05-31

## 2022-05-31 ENCOUNTER — APPOINTMENT (OUTPATIENT)
Dept: RADIOLOGY | Facility: HOSPITAL | Age: 77
End: 2022-05-31
Payer: MEDICARE

## 2022-05-31 ENCOUNTER — APPOINTMENT (OUTPATIENT)
Dept: SPEECH THERAPY | Facility: HOSPITAL | Age: 77
End: 2022-05-31

## 2022-05-31 ENCOUNTER — OUTPATIENT (OUTPATIENT)
Dept: OUTPATIENT SERVICES | Facility: HOSPITAL | Age: 77
LOS: 1 days | End: 2022-05-31

## 2022-05-31 DIAGNOSIS — R13.10 DYSPHAGIA, UNSPECIFIED: ICD-10-CM

## 2022-05-31 PROCEDURE — 74230 X-RAY XM SWLNG FUNCJ C+: CPT | Mod: 26

## 2022-06-06 DIAGNOSIS — R13.10 DYSPHAGIA, UNSPECIFIED: ICD-10-CM

## 2022-10-16 NOTE — H&P ADULT - ASSESSMENT
Date of service: 10-16-22 @ 15:59    Lying in bed in NAD  Has right lower leg pain  Has low grade fever  Has right lower leg erythema    ROS: denies dizziness, no HA, no SOB or cough, no abdominal pain, no diarrhea or constipation; no dysuria    MEDICATIONS  (STANDING):  budesonide 160 MICROgram(s)/formoterol 4.5 MICROgram(s) Inhaler 2 Puff(s) Inhalation two times a day  cefepime   IVPB 2000 milliGRAM(s) IV Intermittent every 12 hours  cholestyramine Powder (Sugar-Free) 4 Gram(s) Oral two times a day  dextrose 5%. 1000 milliLiter(s) (50 mL/Hr) IV Continuous <Continuous>  dextrose 5%. 1000 milliLiter(s) (100 mL/Hr) IV Continuous <Continuous>  dextrose 50% Injectable 25 Gram(s) IV Push once  dextrose 50% Injectable 12.5 Gram(s) IV Push once  dextrose 50% Injectable 25 Gram(s) IV Push once  gabapentin 800 milliGRAM(s) Oral three times a day  glucagon  Injectable 1 milliGRAM(s) IntraMuscular once  insulin lispro (ADMELOG) corrective regimen sliding scale   SubCutaneous Before meals and at bedtime  metoprolol tartrate 25 milliGRAM(s) Oral every 8 hours  morphine ER Tablet 15 milliGRAM(s) Oral every 12 hours  predniSONE   Tablet 10 milliGRAM(s) Oral daily  simvastatin 10 milliGRAM(s) Oral at bedtime  warfarin 5 milliGRAM(s) Oral once    Vital Signs Last 24 Hrs  T(C): 36.6 (16 Oct 2022 07:32), Max: 38.1 (15 Oct 2022 22:25)  T(F): 97.9 (16 Oct 2022 07:32), Max: 100.5 (15 Oct 2022 22:25)  HR: 99 (16 Oct 2022 12:42) (97 - 114)  BP: 139/89 (16 Oct 2022 12:42) (119/87 - 145/91)  BP(mean): 101 (15 Oct 2022 16:00) (101 - 101)  RR: 18 (16 Oct 2022 07:32) (18 - 20)  SpO2: 93% (16 Oct 2022 08:40) (93% - 95%)    Parameters below as of 16 Oct 2022 08:40  Patient On (Oxygen Delivery Method): room air     Physical exam:    Constitutional:  No acute distress  HEENT: NC/AT, EOMI, PERRLA, conjunctivae clear; ears and nose atraumatic  Neck: supple; thyroid not palpable  Back: no tenderness  Respiratory: respiratory effort normal; clear to auscultation  Cardiovascular: S1S2 regular, no murmurs  Abdomen: soft, not tender, not distended, positive BS  Genitourinary: no suprapubic tenderness  Lymphatic: no LN palpable  Musculoskeletal: no muscle tenderness, no joint swelling or tenderness  Extremities: no pedal edema  Right foot, ankle, shin, calf extensive erythema, edema, warmth; thin skin - improving slowly   2 medial aspect necrotic ulcer; scant serous discharge around ankle  Left lower leg thickened skin with chronic discoloration  Neurological/ Psychiatric: AxOx3, judgement and insight normal; moving all extremities  Skin: no rashes; no palpable lesions    Labs: reviewed                        12.5   10 )-----------( 225      ( 15 Oct 2022 06:13 )             39.9     10-15    140  |  107  |  19  ----------------------------<  178<H>  3.7   |  28  |  0.58    Ca    8.6      15 Oct 2022 06:13  Phos  3.1     10-15  Mg     1.8     10-15    Vancomycin Level, Trough: 13.5 ug/mL (10-14 @ 08:45)               13.7   15.58 )-----------( 251      ( 13 Oct 2022 05:44 )             42.0     10-13    139  |  107  |  13  ----------------------------<  100<H>  4.0   |  26  |  0.73    Ca    8.6      13 Oct 2022 05:44  Phos  3.2     10-13  Mg     2.4     10-13    TPro  6.3  /  Alb  2.7<L>  /  TBili  1.2  /  DBili  x   /  AST  19  /  ALT  27  /  AlkPhos  46  10-13     LIVER FUNCTIONS - ( 13 Oct 2022 05:44 )  Alb: 2.7 g/dL / Pro: 6.3 gm/dL / ALK PHOS: 46 U/L / ALT: 27 U/L / AST: 19 U/L / GGT: x           Urinalysis Basic - ( 13 Oct 2022 02:45 )    Color: Yellow / Appearance: Clear / S.005 / pH: x  Gluc: x / Ketone: Negative  / Bili: Negative / Urobili: Negative   Blood: x / Protein: Negative / Nitrite: Negative   Leuk Esterase: Negative / RBC: 3-5 /HPF / WBC 0-2   Sq Epi: x / Non Sq Epi: Occasional / Bacteria: Negative    (10-12 @ 20:26)  NotDetec      Culture - Urine (collected 13 Oct 2022 02:46)  Source: Clean Catch Clean Catch (Midstream)  Final Report (14 Oct 2022 13:50):    No growth    Culture - Blood (collected 12 Oct 2022 20:53)  Source: .Blood None  Gram Stain (14 Oct 2022 03:00):    Growth in aerobic bottle: Gram Negative Rods  Preliminary Report (14 Oct 2022 17:43):    Growth in aerobic bottle: Pseudomonas putida group  Organism: Blood Culture PCR (14 Oct 2022 06:47)      -  Blood PCR Panel: NEG      Method Type: PCR    Culture - Blood (collected 12 Oct 2022 20:26)  Source: .Blood None  Gram Stain (13 Oct 2022 22:37):    Growth in aerobic bottle: Gram Negative Rods  Preliminary Report (14 Oct 2022 20:35):    Growth in aerobic bottle: Pseudomonas putida group    Radiology: all available radiological tests reviewed    Advanced directives addressed: full resuscitation 72y Female PMH HTN, HLD, DM2, constipation, GERD, chronic back pain,  presents to the ED for syncope.  IMPROVE VTE Individual Risk Assessment          RISK                                                          Points    [  ] Previous VTE                                                3    [  ] Thrombophilia                                             2    [  ] Lower limb paralysis                                    2        (unable to hold up >15 seconds)      [  ] Current Cancer                                             2         (within 6 months)    [  ] Immobilization > 24 hrs                              1    [  ] ICU/CCU stay > 24 hours                            1    [x ] Age > 60                                                    1    IMPROVE VTE Score _______1__

## 2022-12-07 NOTE — PROGRESS NOTE ADULT - SUBJECTIVE AND OBJECTIVE BOX
Please call Sherice Wilson tomorrow to get post-procedure pain diary report from the patient for  Medial Branch/Dorsal Ramus Block, Bilateral, Lumbar L3, L4, and L5, First Diagnostic Block performed by Alcon Eldridge MD on 12/7/2022.    Pre-procedure pain score: 8    Next office visit: Repeat block on 12/21/22    Thank you!   CC/F/U for: cellulitis    INTERVAL HPI/OVERNIGHT EVENTS:  Pt seen and examined at bedside.     Allergies/Intolerance: No Known Allergies      MEDICATIONS  (STANDING):  piperacillin/tazobactam IVPB. 3.375Gram(s) IV Intermittent every 8 hours  insulin lispro (HumaLOG) corrective regimen sliding scale  SubCutaneous three times a day before meals  insulin lispro (HumaLOG) corrective regimen sliding scale  SubCutaneous at bedtime  dextrose 50% Injectable 12.5Gram(s) IV Push once  dextrose 50% Injectable 25Gram(s) IV Push once  dextrose 50% Injectable 25Gram(s) IV Push once  heparin  Injectable 5000Unit(s) SubCutaneous every 12 hours  atorvastatin 20milliGRAM(s) Oral at bedtime  amLODIPine   Tablet 5milliGRAM(s) Oral daily  docusate sodium 100milliGRAM(s) Oral two times a day  vancomycin  IVPB 1000milliGRAM(s) IV Intermittent every 12 hours    MEDICATIONS  (PRN):  dextrose Gel 1Dose(s) Oral once PRN Blood Glucose LESS THAN 70 milliGRAM(s)/deciliter  glucagon  Injectable 1milliGRAM(s) IntraMuscular once PRN Glucose LESS THAN 70 milligrams/deciliter  acetaminophen   Tablet 650milliGRAM(s) Oral every 6 hours PRN For Temp greater than 38 C (100.4 F)  acetaminophen   Tablet. 650milliGRAM(s) Oral every 6 hours PRN Moderate Pain (4 - 6)  lactulose Syrup 20Gram(s) Oral two times a day PRN constipation        ROS: as above; all other systems reviewed and wnl      PHYSICAL EXAMINATION:  Vital Signs Last 24 Hrs  T(C): 36.4, Max: 37 (03-03 @ 18:12)  T(F): 97.6, Max: 98.6 (03-03 @ 18:12)  HR: 70 (70 - 87)  BP: 131/68 (110/76 - 138/83)  BP(mean): --  RR: 16 (16 - 18)  SpO2: 98% (96% - 100%)  CAPILLARY BLOOD GLUCOSE  107 (04 Mar 2017 17:00)  106 (04 Mar 2017 11:50)  128 (04 Mar 2017 08:02)  105 (03 Mar 2017 22:53)      GENERAL: NAD, well-groomed, well-developed  HEAD:  atraumatic, normocephalic  EYES: sclera anicteric  ENMT: mucous membranes moist  NECK: supple, No JVD  CHEST/LUNG: clear to auscultation bilaterally; no rales, rhonchi, or wheezing b/l  HEART: normal S1, S2  ABDOMEN: BS+, soft, ND, NT   EXTREMITIES:  pulses palpable; no clubbing, cyanosis, or edema b/l LEs  NEURO: awake, alert, interactive; moves all extremities  SKIN: no rashes or lesions      LABS:                        14.9   2.9   )-----------( 220      ( 04 Mar 2017 07:30 )             45.2     04 Mar 2017 07:30    141    |  107    |  11     ----------------------------<  115    3.9     |  29     |  0.70     Ca    9.7        04 Mar 2017 07:30    TPro  7.9    /  Alb  3.6    /  TBili  0.7    /  DBili  x      /  AST  16     /  ALT  19     /  AlkPhos  192    04 Mar 2017 07:30      ASSESSMENT AND PLAN:  71F, type 2 DM, HTN, hx Cdiff, being tx'd for LLE cellulitis s/p failed o/p abxs, neg blood cxs x2, UA neg, no e/o VTE w/neg b/l lower extrem dopplers    ID/MSK:  -IV abxs cvrg w/zosyn, vanco  -WBC shows leukopenia, monitoring in setting of acute infection  -f/u cxs    ENDO: DM - continue glycemic tx w/insulin SS    CV: HTN - stable hemodynamics; continue Norvasc; continue cardioprotective lipitor    OTHER: dvt prophy CC/F/U for: LLE cellulitis    INTERVAL HPI/OVERNIGHT EVENTS:  Pt seen and examined at bedside. Feels better, leg swelling less    Allergies/Intolerance: No Known Allergies      MEDICATIONS  (STANDING):  piperacillin/tazobactam IVPB. 3.375Gram(s) IV Intermittent every 8 hours  insulin lispro (HumaLOG) corrective regimen sliding scale  SubCutaneous three times a day before meals  insulin lispro (HumaLOG) corrective regimen sliding scale  SubCutaneous at bedtime  dextrose 50% Injectable 12.5Gram(s) IV Push once  dextrose 50% Injectable 25Gram(s) IV Push once  dextrose 50% Injectable 25Gram(s) IV Push once  heparin  Injectable 5000Unit(s) SubCutaneous every 12 hours  atorvastatin 20milliGRAM(s) Oral at bedtime  amLODIPine   Tablet 5milliGRAM(s) Oral daily  docusate sodium 100milliGRAM(s) Oral two times a day  vancomycin  IVPB 1000milliGRAM(s) IV Intermittent every 12 hours    MEDICATIONS  (PRN):  dextrose Gel 1Dose(s) Oral once PRN Blood Glucose LESS THAN 70 milliGRAM(s)/deciliter  glucagon  Injectable 1milliGRAM(s) IntraMuscular once PRN Glucose LESS THAN 70 milligrams/deciliter  acetaminophen   Tablet 650milliGRAM(s) Oral every 6 hours PRN For Temp greater than 38 C (100.4 F)  acetaminophen   Tablet. 650milliGRAM(s) Oral every 6 hours PRN Moderate Pain (4 - 6)  lactulose Syrup 20Gram(s) Oral two times a day PRN constipation        ROS: as above; all other systems reviewed and wnl      PHYSICAL EXAMINATION:  Vital Signs Last 24 Hrs  T(C): 36.4, Max: 37 (03-03 @ 18:12)  T(F): 97.6, Max: 98.6 (03-03 @ 18:12)  HR: 70 (70 - 87)  BP: 131/68 (110/76 - 138/83)  BP(mean): --  RR: 16 (16 - 18)  SpO2: 98% (96% - 100%)  CAPILLARY BLOOD GLUCOSE  107 (04 Mar 2017 17:00)  106 (04 Mar 2017 11:50)  128 (04 Mar 2017 08:02)  105 (03 Mar 2017 22:53)      GENERAL: elderly female; NAD, well-groomed, well-developed  HEAD:  atraumatic, normocephalic  EYES: sclera anicteric  ENMT: mucous membranes moist  NECK: supple, No JVD  CHEST/LUNG: clear to auscultation bilaterally; no rales, rhonchi, or wheezing b/l  HEART: normal S1, S2  ABDOMEN: BS+, soft, ND, NT   EXTREMITIES:  pulses palpable; no clubbing, cyanosis b/l LEs; LLE swollen from ankle up to thigh  NEURO: awake, alert, interactive; moves all extremities      LABS:                        14.9   2.9   )-----------( 220      ( 04 Mar 2017 07:30 )             45.2     04 Mar 2017 07:30    141    |  107    |  11     ----------------------------<  115    3.9     |  29     |  0.70     Ca    9.7        04 Mar 2017 07:30    TPro  7.9    /  Alb  3.6    /  TBili  0.7    /  DBili  x      /  AST  16     /  ALT  19     /  AlkPhos  192    04 Mar 2017 07:30      ASSESSMENT AND PLAN:  71F, type 2 DM, HTN, hx Cdiff, being tx'd for LLE cellulitis s/p failed o/p abxs (Bactrim), neg blood cxs x2, UA neg, no e/o VTE w/neg b/l lower extrem dopplers    ID/MSK:  -IV abxs cvrg w/zosyn, vanco  -WBC shows leukopenia, monitoring in setting of acute infection  -f/u cxs    ENDO: DM - continue glycemic tx w/insulin SS    CV: HTN - stable hemodynamics; continue Norvasc; continue cardioprotective lipitor    OTHER: dvt prophy

## 2023-01-05 NOTE — ED ADULT NURSE NOTE - MUSCULOSKELETAL WDL
Follow-up with primary care as soon as possible  Follow-up with neurology per referral  for seizure clinic  Return to the ED if symptoms repeat
Full range of motion of upper and lower extremities, no joint tenderness/swelling.

## 2023-07-31 ENCOUNTER — INPATIENT (INPATIENT)
Facility: HOSPITAL | Age: 78
LOS: 5 days | Discharge: NURSING, REHAB OR LONG-TERM CARE | DRG: 981 | End: 2023-08-06
Attending: INTERNAL MEDICINE | Admitting: INTERNAL MEDICINE
Payer: MEDICARE

## 2023-07-31 VITALS
RESPIRATION RATE: 18 BRPM | OXYGEN SATURATION: 95 % | HEART RATE: 120 BPM | WEIGHT: 179.9 LBS | SYSTOLIC BLOOD PRESSURE: 128 MMHG | HEIGHT: 69 IN | DIASTOLIC BLOOD PRESSURE: 75 MMHG | TEMPERATURE: 98 F

## 2023-07-31 DIAGNOSIS — R10.9 UNSPECIFIED ABDOMINAL PAIN: ICD-10-CM

## 2023-07-31 DIAGNOSIS — R00.0 TACHYCARDIA, UNSPECIFIED: ICD-10-CM

## 2023-07-31 DIAGNOSIS — R09.89 OTHER SPECIFIED SYMPTOMS AND SIGNS INVOLVING THE CIRCULATORY AND RESPIRATORY SYSTEMS: ICD-10-CM

## 2023-07-31 DIAGNOSIS — E78.5 HYPERLIPIDEMIA, UNSPECIFIED: ICD-10-CM

## 2023-07-31 DIAGNOSIS — Z29.9 ENCOUNTER FOR PROPHYLACTIC MEASURES, UNSPECIFIED: ICD-10-CM

## 2023-07-31 DIAGNOSIS — L89.159 PRESSURE ULCER OF SACRAL REGION, UNSPECIFIED STAGE: ICD-10-CM

## 2023-07-31 DIAGNOSIS — K59.00 CONSTIPATION, UNSPECIFIED: ICD-10-CM

## 2023-07-31 DIAGNOSIS — E11.9 TYPE 2 DIABETES MELLITUS WITHOUT COMPLICATIONS: ICD-10-CM

## 2023-07-31 DIAGNOSIS — Z98.49 CATARACT EXTRACTION STATUS, UNSPECIFIED EYE: Chronic | ICD-10-CM

## 2023-07-31 DIAGNOSIS — I10 ESSENTIAL (PRIMARY) HYPERTENSION: ICD-10-CM

## 2023-07-31 LAB
ALBUMIN SERPL ELPH-MCNC: 3.3 G/DL — SIGNIFICANT CHANGE UP (ref 3.3–5)
ALP SERPL-CCNC: 91 U/L — SIGNIFICANT CHANGE UP (ref 40–120)
ALT FLD-CCNC: 46 U/L — HIGH (ref 10–45)
ANION GAP SERPL CALC-SCNC: 18 MMOL/L — HIGH (ref 5–17)
ANISOCYTOSIS BLD QL: SLIGHT — SIGNIFICANT CHANGE UP
APPEARANCE UR: CLEAR — SIGNIFICANT CHANGE UP
APTT BLD: 32.6 SEC — SIGNIFICANT CHANGE UP (ref 24.5–35.6)
AST SERPL-CCNC: 28 U/L — SIGNIFICANT CHANGE UP (ref 10–40)
BACTERIA # UR AUTO: NEGATIVE — SIGNIFICANT CHANGE UP
BASE EXCESS BLDV CALC-SCNC: 2.7 MMOL/L — SIGNIFICANT CHANGE UP (ref -2–3)
BASE EXCESS BLDV CALC-SCNC: 5.6 MMOL/L — HIGH (ref -2–3)
BASOPHILS # BLD AUTO: 0.11 K/UL — SIGNIFICANT CHANGE UP (ref 0–0.2)
BASOPHILS NFR BLD AUTO: 1.8 % — SIGNIFICANT CHANGE UP (ref 0–2)
BILIRUB SERPL-MCNC: 0.3 MG/DL — SIGNIFICANT CHANGE UP (ref 0.2–1.2)
BILIRUB UR-MCNC: NEGATIVE — SIGNIFICANT CHANGE UP
BUN SERPL-MCNC: 6 MG/DL — LOW (ref 7–23)
BURR CELLS BLD QL SMEAR: PRESENT — SIGNIFICANT CHANGE UP
CA-I SERPL-SCNC: 1.24 MMOL/L — SIGNIFICANT CHANGE UP (ref 1.15–1.33)
CA-I SERPL-SCNC: 1.29 MMOL/L — SIGNIFICANT CHANGE UP (ref 1.15–1.33)
CALCIUM SERPL-MCNC: 9.6 MG/DL — SIGNIFICANT CHANGE UP (ref 8.4–10.5)
CHLORIDE BLDV-SCNC: 101 MMOL/L — SIGNIFICANT CHANGE UP (ref 96–108)
CHLORIDE BLDV-SCNC: 98 MMOL/L — SIGNIFICANT CHANGE UP (ref 96–108)
CHLORIDE SERPL-SCNC: 101 MMOL/L — SIGNIFICANT CHANGE UP (ref 96–108)
CO2 BLDV-SCNC: 31 MMOL/L — HIGH (ref 22–26)
CO2 BLDV-SCNC: 33 MMOL/L — HIGH (ref 22–26)
CO2 SERPL-SCNC: 22 MMOL/L — SIGNIFICANT CHANGE UP (ref 22–31)
COLOR SPEC: SIGNIFICANT CHANGE UP
CREAT SERPL-MCNC: 0.44 MG/DL — LOW (ref 0.5–1.3)
DACRYOCYTES BLD QL SMEAR: SIGNIFICANT CHANGE UP
DIFF PNL FLD: NEGATIVE — SIGNIFICANT CHANGE UP
EGFR: 99 ML/MIN/1.73M2 — SIGNIFICANT CHANGE UP
EOSINOPHIL # BLD AUTO: 0.17 K/UL — SIGNIFICANT CHANGE UP (ref 0–0.5)
EOSINOPHIL NFR BLD AUTO: 2.7 % — SIGNIFICANT CHANGE UP (ref 0–6)
EPI CELLS # UR: 1 /HPF — SIGNIFICANT CHANGE UP
GAS PNL BLDV: 135 MMOL/L — LOW (ref 136–145)
GAS PNL BLDV: 136 MMOL/L — SIGNIFICANT CHANGE UP (ref 136–145)
GAS PNL BLDV: SIGNIFICANT CHANGE UP
GLUCOSE BLDV-MCNC: 91 MG/DL — SIGNIFICANT CHANGE UP (ref 70–99)
GLUCOSE BLDV-MCNC: 94 MG/DL — SIGNIFICANT CHANGE UP (ref 70–99)
GLUCOSE SERPL-MCNC: 99 MG/DL — SIGNIFICANT CHANGE UP (ref 70–99)
GLUCOSE UR QL: NEGATIVE — SIGNIFICANT CHANGE UP
HCO3 BLDV-SCNC: 30 MMOL/L — HIGH (ref 22–29)
HCO3 BLDV-SCNC: 31 MMOL/L — HIGH (ref 22–29)
HCT VFR BLD CALC: 39.7 % — SIGNIFICANT CHANGE UP (ref 34.5–45)
HCT VFR BLDA CALC: 38 % — SIGNIFICANT CHANGE UP (ref 34.5–46.5)
HCT VFR BLDA CALC: 40 % — SIGNIFICANT CHANGE UP (ref 34.5–46.5)
HGB BLD CALC-MCNC: 12.7 G/DL — SIGNIFICANT CHANGE UP (ref 11.7–16.1)
HGB BLD CALC-MCNC: 13.4 G/DL — SIGNIFICANT CHANGE UP (ref 11.7–16.1)
HGB BLD-MCNC: 12.6 G/DL — SIGNIFICANT CHANGE UP (ref 11.5–15.5)
HYALINE CASTS # UR AUTO: 0 /LPF — SIGNIFICANT CHANGE UP (ref 0–2)
INR BLD: 1.22 RATIO — HIGH (ref 0.85–1.18)
KETONES UR-MCNC: NEGATIVE — SIGNIFICANT CHANGE UP
LACTATE BLDV-MCNC: 2.7 MMOL/L — HIGH (ref 0.5–2)
LACTATE BLDV-MCNC: 4.6 MMOL/L — CRITICAL HIGH (ref 0.5–2)
LEUKOCYTE ESTERASE UR-ACNC: NEGATIVE — SIGNIFICANT CHANGE UP
LYMPHOCYTES # BLD AUTO: 1.32 K/UL — SIGNIFICANT CHANGE UP (ref 1–3.3)
LYMPHOCYTES # BLD AUTO: 20.9 % — SIGNIFICANT CHANGE UP (ref 13–44)
MACROCYTES BLD QL: SLIGHT — SIGNIFICANT CHANGE UP
MAGNESIUM SERPL-MCNC: 2 MG/DL — SIGNIFICANT CHANGE UP (ref 1.6–2.6)
MANUAL SMEAR VERIFICATION: SIGNIFICANT CHANGE UP
MCHC RBC-ENTMCNC: 23.5 PG — LOW (ref 27–34)
MCHC RBC-ENTMCNC: 31.7 GM/DL — LOW (ref 32–36)
MCV RBC AUTO: 74.1 FL — LOW (ref 80–100)
MONOCYTES # BLD AUTO: 0.35 K/UL — SIGNIFICANT CHANGE UP (ref 0–0.9)
MONOCYTES NFR BLD AUTO: 5.5 % — SIGNIFICANT CHANGE UP (ref 2–14)
NEUTROPHILS # BLD AUTO: 3.51 K/UL — SIGNIFICANT CHANGE UP (ref 1.8–7.4)
NEUTROPHILS NFR BLD AUTO: 55.5 % — SIGNIFICANT CHANGE UP (ref 43–77)
NITRITE UR-MCNC: NEGATIVE — SIGNIFICANT CHANGE UP
NT-PROBNP SERPL-SCNC: 111 PG/ML — SIGNIFICANT CHANGE UP (ref 0–300)
OVALOCYTES BLD QL SMEAR: SLIGHT — SIGNIFICANT CHANGE UP
PCO2 BLDV: 48 MMHG — HIGH (ref 39–42)
PCO2 BLDV: 55 MMHG — HIGH (ref 39–42)
PH BLDV: 7.34 — SIGNIFICANT CHANGE UP (ref 7.32–7.43)
PH BLDV: 7.42 — SIGNIFICANT CHANGE UP (ref 7.32–7.43)
PH UR: 7.5 — SIGNIFICANT CHANGE UP (ref 5–8)
PLAT MORPH BLD: NORMAL — SIGNIFICANT CHANGE UP
PLATELET # BLD AUTO: 288 K/UL — SIGNIFICANT CHANGE UP (ref 150–400)
PO2 BLDV: 22 MMHG — LOW (ref 25–45)
PO2 BLDV: 24 MMHG — LOW (ref 25–45)
POIKILOCYTOSIS BLD QL AUTO: SIGNIFICANT CHANGE UP
POLYCHROMASIA BLD QL SMEAR: SLIGHT — SIGNIFICANT CHANGE UP
POTASSIUM BLDV-SCNC: 3.8 MMOL/L — SIGNIFICANT CHANGE UP (ref 3.5–5.1)
POTASSIUM BLDV-SCNC: 4.7 MMOL/L — SIGNIFICANT CHANGE UP (ref 3.5–5.1)
POTASSIUM SERPL-MCNC: 3.6 MMOL/L — SIGNIFICANT CHANGE UP (ref 3.5–5.3)
POTASSIUM SERPL-SCNC: 3.6 MMOL/L — SIGNIFICANT CHANGE UP (ref 3.5–5.3)
PROCALCITONIN SERPL-MCNC: 0.05 NG/ML — SIGNIFICANT CHANGE UP (ref 0.02–0.1)
PROT SERPL-MCNC: 7.6 G/DL — SIGNIFICANT CHANGE UP (ref 6–8.3)
PROT UR-MCNC: NEGATIVE — SIGNIFICANT CHANGE UP
PROTHROM AB SERPL-ACNC: 13.3 SEC — HIGH (ref 9.5–13)
RBC # BLD: 5.36 M/UL — HIGH (ref 3.8–5.2)
RBC # FLD: 15.9 % — HIGH (ref 10.3–14.5)
RBC BLD AUTO: ABNORMAL
RBC CASTS # UR COMP ASSIST: 0 /HPF — SIGNIFICANT CHANGE UP (ref 0–4)
SAO2 % BLDV: 27.6 % — LOW (ref 67–88)
SAO2 % BLDV: 35.2 % — LOW (ref 67–88)
SCHISTOCYTES BLD QL AUTO: SLIGHT — SIGNIFICANT CHANGE UP
SODIUM SERPL-SCNC: 141 MMOL/L — SIGNIFICANT CHANGE UP (ref 135–145)
SP GR SPEC: 1.04 — HIGH (ref 1.01–1.02)
TROPONIN T, HIGH SENSITIVITY RESULT: 11 NG/L — SIGNIFICANT CHANGE UP (ref 0–51)
TSH SERPL-MCNC: 3.05 UIU/ML — SIGNIFICANT CHANGE UP (ref 0.27–4.2)
UROBILINOGEN FLD QL: NEGATIVE — SIGNIFICANT CHANGE UP
VARIANT LYMPHS # BLD: 13.6 % — HIGH (ref 0–6)
WBC # BLD: 6.32 K/UL — SIGNIFICANT CHANGE UP (ref 3.8–10.5)
WBC # FLD AUTO: 6.32 K/UL — SIGNIFICANT CHANGE UP (ref 3.8–10.5)
WBC UR QL: 1 /HPF — SIGNIFICANT CHANGE UP (ref 0–5)

## 2023-07-31 PROCEDURE — 74177 CT ABD & PELVIS W/CONTRAST: CPT | Mod: 26,MH

## 2023-07-31 PROCEDURE — 71045 X-RAY EXAM CHEST 1 VIEW: CPT | Mod: 26

## 2023-07-31 PROCEDURE — 99223 1ST HOSP IP/OBS HIGH 75: CPT

## 2023-07-31 PROCEDURE — 99285 EMERGENCY DEPT VISIT HI MDM: CPT | Mod: FS

## 2023-07-31 PROCEDURE — 71275 CT ANGIOGRAPHY CHEST: CPT | Mod: 26,MH

## 2023-07-31 RX ORDER — DEXTROSE 50 % IN WATER 50 %
12.5 SYRINGE (ML) INTRAVENOUS ONCE
Refills: 0 | Status: DISCONTINUED | OUTPATIENT
Start: 2023-07-31 | End: 2023-08-06

## 2023-07-31 RX ORDER — GABAPENTIN 400 MG/1
200 CAPSULE ORAL THREE TIMES A DAY
Refills: 0 | Status: DISCONTINUED | OUTPATIENT
Start: 2023-07-31 | End: 2023-08-06

## 2023-07-31 RX ORDER — INSULIN LISPRO 100/ML
VIAL (ML) SUBCUTANEOUS AT BEDTIME
Refills: 0 | Status: DISCONTINUED | OUTPATIENT
Start: 2023-07-31 | End: 2023-08-06

## 2023-07-31 RX ORDER — MEROPENEM 1 G/30ML
1000 INJECTION INTRAVENOUS EVERY 8 HOURS
Refills: 0 | Status: DISCONTINUED | OUTPATIENT
Start: 2023-08-01 | End: 2023-08-06

## 2023-07-31 RX ORDER — SODIUM CHLORIDE 9 MG/ML
500 INJECTION, SOLUTION INTRAVENOUS ONCE
Refills: 0 | Status: COMPLETED | OUTPATIENT
Start: 2023-07-31 | End: 2023-07-31

## 2023-07-31 RX ORDER — SODIUM CHLORIDE 9 MG/ML
1000 INJECTION, SOLUTION INTRAVENOUS
Refills: 0 | Status: DISCONTINUED | OUTPATIENT
Start: 2023-07-31 | End: 2023-08-06

## 2023-07-31 RX ORDER — MEROPENEM 1 G/30ML
INJECTION INTRAVENOUS
Refills: 0 | Status: DISCONTINUED | OUTPATIENT
Start: 2023-08-01 | End: 2023-08-06

## 2023-07-31 RX ORDER — MEROPENEM 1 G/30ML
1000 INJECTION INTRAVENOUS ONCE
Refills: 0 | Status: COMPLETED | OUTPATIENT
Start: 2023-07-31 | End: 2023-08-01

## 2023-07-31 RX ORDER — VANCOMYCIN HCL 1 G
750 VIAL (EA) INTRAVENOUS EVERY 12 HOURS
Refills: 0 | Status: DISCONTINUED | OUTPATIENT
Start: 2023-08-01 | End: 2023-08-03

## 2023-07-31 RX ORDER — VANCOMYCIN HCL 1 G
750 VIAL (EA) INTRAVENOUS ONCE
Refills: 0 | Status: COMPLETED | OUTPATIENT
Start: 2023-07-31 | End: 2023-08-01

## 2023-07-31 RX ORDER — POLYETHYLENE GLYCOL 3350 17 G/17G
17 POWDER, FOR SOLUTION ORAL DAILY
Refills: 0 | Status: DISCONTINUED | OUTPATIENT
Start: 2023-07-31 | End: 2023-08-06

## 2023-07-31 RX ORDER — LACTULOSE 10 G/15ML
20 SOLUTION ORAL DAILY
Refills: 0 | Status: DISCONTINUED | OUTPATIENT
Start: 2023-07-31 | End: 2023-08-06

## 2023-07-31 RX ORDER — DEXTROSE 50 % IN WATER 50 %
25 SYRINGE (ML) INTRAVENOUS ONCE
Refills: 0 | Status: DISCONTINUED | OUTPATIENT
Start: 2023-07-31 | End: 2023-08-06

## 2023-07-31 RX ORDER — SODIUM CHLORIDE 9 MG/ML
1000 INJECTION, SOLUTION INTRAVENOUS ONCE
Refills: 0 | Status: COMPLETED | OUTPATIENT
Start: 2023-07-31 | End: 2023-07-31

## 2023-07-31 RX ORDER — DEXTROSE 50 % IN WATER 50 %
15 SYRINGE (ML) INTRAVENOUS ONCE
Refills: 0 | Status: DISCONTINUED | OUTPATIENT
Start: 2023-07-31 | End: 2023-08-06

## 2023-07-31 RX ORDER — ENOXAPARIN SODIUM 100 MG/ML
40 INJECTION SUBCUTANEOUS EVERY 24 HOURS
Refills: 0 | Status: DISCONTINUED | OUTPATIENT
Start: 2023-07-31 | End: 2023-08-06

## 2023-07-31 RX ORDER — ASCORBIC ACID 60 MG
500 TABLET,CHEWABLE ORAL DAILY
Refills: 0 | Status: DISCONTINUED | OUTPATIENT
Start: 2023-07-31 | End: 2023-08-06

## 2023-07-31 RX ORDER — VANCOMYCIN HCL 1 G
VIAL (EA) INTRAVENOUS
Refills: 0 | Status: DISCONTINUED | OUTPATIENT
Start: 2023-08-01 | End: 2023-08-03

## 2023-07-31 RX ORDER — GLUCAGON INJECTION, SOLUTION 0.5 MG/.1ML
1 INJECTION, SOLUTION SUBCUTANEOUS ONCE
Refills: 0 | Status: DISCONTINUED | OUTPATIENT
Start: 2023-07-31 | End: 2023-08-06

## 2023-07-31 RX ORDER — ACETAMINOPHEN 500 MG
650 TABLET ORAL EVERY 6 HOURS
Refills: 0 | Status: DISCONTINUED | OUTPATIENT
Start: 2023-07-31 | End: 2023-08-06

## 2023-07-31 RX ORDER — AMLODIPINE BESYLATE 2.5 MG/1
5 TABLET ORAL DAILY
Refills: 0 | Status: DISCONTINUED | OUTPATIENT
Start: 2023-07-31 | End: 2023-08-06

## 2023-07-31 RX ORDER — INSULIN LISPRO 100/ML
VIAL (ML) SUBCUTANEOUS
Refills: 0 | Status: DISCONTINUED | OUTPATIENT
Start: 2023-07-31 | End: 2023-08-06

## 2023-07-31 RX ORDER — ATORVASTATIN CALCIUM 80 MG/1
20 TABLET, FILM COATED ORAL AT BEDTIME
Refills: 0 | Status: DISCONTINUED | OUTPATIENT
Start: 2023-07-31 | End: 2023-08-06

## 2023-07-31 RX ADMIN — SODIUM CHLORIDE 500 MILLILITER(S): 9 INJECTION, SOLUTION INTRAVENOUS at 22:19

## 2023-07-31 RX ADMIN — SODIUM CHLORIDE 2000 MILLILITER(S): 9 INJECTION, SOLUTION INTRAVENOUS at 16:31

## 2023-07-31 NOTE — H&P ADULT - NSHPSOCIALHISTORY_GEN_ALL_CORE
, retired from working for a [employer]  has one son, Ethan, and two grandsons   used to live at assisted living when able to ambulate with a cane but has been living at nursing home since wheelchair bound for over 3 years  no tobacco or alcohol use

## 2023-07-31 NOTE — ED ADULT NURSE NOTE - NSFALLRISKINTERV_ED_ALL_ED

## 2023-07-31 NOTE — H&P ADULT - PROBLEM SELECTOR PLAN 1
with Q waves III, aVF, V3-V5 in diabetic patient  - CTA without central PE  - troponin with Q waves III, aVF, V3-V5 in diabetic patient  - CTA without central PE  - Tele monitor for arrythmia, troponin 11, TSH 3.05, Lipase 36, CT A/P without acute pathology for abdominal pain  - lactate on presentation 4.6, improved to 2.7 with 1L LR bolus   - tachycardia improved from 120 to 90 with 1L IVF  - will check TTE to evaluate for structural heart disease, wall motion abnormalities and LV EF%  - Cardiology outpatient note appreciated

## 2023-07-31 NOTE — H&P ADULT - CARDIOVASCULAR SYMPTOMS
LM for patient that paperwork is at [facility]  for her to    palpitations

## 2023-07-31 NOTE — ED PROVIDER NOTE - OBJECTIVE STATEMENT
74F with HTN, HLD, T2DM, DVT, chronic sacral wound on vanc Presenting to the emergency patient cardiac presents to the ED from an outpatient cardiologist for the evaluation of tachycardia.  Per EMS patient was noted to be tachycardic and therefore sent to Dr. Cole office and then was subsequently sent here for abnormal EKG for evaluation of a pulmonary embolism.  When evaluating patient she endorses "stomach problems" intermittently.  Patient reports chronic constipation last bowel movement 4 days ago.  1 episode of vomiting about 1 week ago.  Patient was able to tolerate breakfast including orange juice and mashed potatoes this morning. patient denies chest pain and SOB    : [id number]

## 2023-07-31 NOTE — ED ADULT NURSE NOTE - OBJECTIVE STATEMENT
78 yo presents to the ED from MD office. A&Ox4 by EMS with aide at bedside, lives at group home at baseline. history of HTN, HLD, T2DM, DVT, chronic sacral wound on vanc via PICC line R arm. pt brought to the emergency patient from an outpatient cardiologist for the evaluation of tachycardia. Per EMS patient was noted to be tachycardic and therefore sent to Dr. Cole office and then was subsequently sent here for abnormal EKG for evaluation of a pulmonary embolism. When evaluating patient she endorses "stomach problems" intermittently. Patient reports chronic constipation last bowel movement 4 days ago. 1 episode of vomiting about 1 week ago. Patient was able to tolerate breakfast including orange juice and mashed potatoes this morning. patient denies chest pain and SOB. : [id number]. 20G inserted LAC. Patient undressed and placed into gown, call bell in hand and side rails up for safety. warm blanket provided, vital signs stable, pt in no acute distress.

## 2023-07-31 NOTE — H&P ADULT - NSHPADDITIONALINFOADULT_GEN_ALL_CORE
discussed with Dr. Lorrie Siddiqui who requested for initial evaluation/H&P and will assume care of this patient in am of 8/1/23.

## 2023-07-31 NOTE — ED PROVIDER NOTE - ATTENDING APP SHARED VISIT CONTRIBUTION OF CARE
------------ATTENDING NOTE------------  pt w/ aide brought to ED by EMS from cardiologist for concerns of sinus tachycardia, per EMS to "rule out a PE", pt c/o chest pain/discomfort or sob/dyspnea, c/o epigastric abdominal pain over past week w/ initially some nausea/nbnb vomiting and constipation over past 4 days, per aide pt is otherwise at her baseline mental/functional status, clear chest w/o distress, tachy low 100s but regular, equal distal pulses, mild epigastric tenderness, awaiting labs/imaging and close reassessments -->  - Volodymyr Messer MD   ----------------------------------------------

## 2023-07-31 NOTE — H&P ADULT - ADDITIONAL PE
T(F): 98.5 (31 Jul 2023 18:18), Max: 98.6 (31 Jul 2023 16:25)  HR: 93 (31 Jul 2023 18:18) (93 - 120)  BP: 148/81 (31 Jul 2023 18:18) (128/75 - 148/81)  RR: 18 (31 Jul 2023 18:18) (18 - 18)  SpO2: 100% (31 Jul 2023 18:18) (95% - 100%) room air

## 2023-07-31 NOTE — H&P ADULT - HISTORY OF PRESENT ILLNESS
74F with HTN, HLD, T2DM, DVT, chronic sacral wound on vanc Presenting to the emergency patient cardiac presents to the ED from an outpatient cardiologist for the evaluation of tachycardia.  Per EMS patient was noted to be tachycardic and therefore sent to Dr. Cole office and then was subsequently sent here for abnormal EKG for evaluation of a pulmonary embolism.  When evaluating patient she endorses "stomach problems" intermittently.  Patient reports chronic constipation last bowel movement 4 days ago.  1 episode of vomiting about 1 week ago.  Patient was able to tolerate breakfast including orange juice and mashed potatoes this morning. patient denies chest pain and SOB 74 [place] Female wheelchair bound with history of HTN, HLD, T2DM, DVT 2 year ago off anticoagulation, obesity, osteoarthritis, PAD, chronic sacral wound (2 years and 4 months per patient), recently started on Meropenem (7/24 for 6 weeks) and Vanco (7/26 for 4 weeks) via right PICC line, was sent in form outpatient cardiologist, Dr. Toney's office for the evaluation of tachycardia.  Patient reported to be tachycardic and "feels sick but cannot elaborate further" , then was subsequently found sinus tachycardia on EKG with history of DVT off anticoagulation, she was sent in to ED to evaluate for a pulmonary embolism.  Currently, patient is hungry, but no other symptoms.  She endorses headache and stomach problems/constipation intermittently, last bowel movement 4 days ago.  Patient had 1 episode of vomiting about 1 week ago, but was able to tolerate breakfast including orange juice and mashed potatoes this morning.

## 2023-07-31 NOTE — H&P ADULT - PROBLEM SELECTOR PLAN 2
- will continue with Vanco 750mg IV Q12 (7/26) and Meropenem 1gm IV Q8 (7/24) for sacral wound infection  - Wound care evaluation

## 2023-07-31 NOTE — H&P ADULT - PROBLEM SELECTOR PLAN 4
- will hold Meformin  - will monitor finger sticks and cover with insulin sliding scales  - will follow up HB A1C

## 2023-07-31 NOTE — ED ADULT TRIAGE NOTE - CHIEF COMPLAINT QUOTE
as per EMS, pt "went to the cardiologist and was found to be tachycardic and is being ruled out for PE"

## 2023-07-31 NOTE — H&P ADULT - NSICDXPASTMEDICALHX_GEN_ALL_CORE_FT
PAST MEDICAL HISTORY:  DM (diabetes mellitus)     HLD (hyperlipidemia)     HTN (hypertension)      PAST MEDICAL HISTORY:  Anxiety     Chronic ulcer of sacral region     DM (diabetes mellitus)     DVT, lower extremity     HLD (hyperlipidemia)     HTN (hypertension)     Macular degeneration     Obesity     Osteoarthritis     Ovarian cyst     PAD (peripheral artery disease)

## 2023-07-31 NOTE — H&P ADULT - ASSESSMENT
73 y/o F Hx of DM2, HTN, HLD, prior DVT off AC, obesity, OA, chronic right sacral decubitus ulcer recently started on Abx presented with tachycardia, CTA without central pulmonary embolus admitted for further cardiac work up

## 2023-07-31 NOTE — H&P ADULT - TIME BILLING
reviewing ED documentation, independently obtaining a history and interpreting results of tests, performing a physical examination, reviewing tests/imaging, discussing the plan with the patient, counseling and educating the patient/family member(s), ordering medications/tests, documenting clinical information in the electronic health record, and coordinating care.

## 2023-08-01 LAB
A1C WITH ESTIMATED AVERAGE GLUCOSE RESULT: 6.3 % — HIGH (ref 4–5.6)
A1C WITH ESTIMATED AVERAGE GLUCOSE RESULT: 6.6 % — HIGH (ref 4–5.6)
ANION GAP SERPL CALC-SCNC: 13 MMOL/L — SIGNIFICANT CHANGE UP (ref 5–17)
BASOPHILS # BLD AUTO: 0.06 K/UL — SIGNIFICANT CHANGE UP (ref 0–0.2)
BASOPHILS NFR BLD AUTO: 1.2 % — SIGNIFICANT CHANGE UP (ref 0–2)
BUN SERPL-MCNC: <4 MG/DL — LOW (ref 7–23)
CALCIUM SERPL-MCNC: 9.5 MG/DL — SIGNIFICANT CHANGE UP (ref 8.4–10.5)
CHLORIDE SERPL-SCNC: 102 MMOL/L — SIGNIFICANT CHANGE UP (ref 96–108)
CO2 SERPL-SCNC: 24 MMOL/L — SIGNIFICANT CHANGE UP (ref 22–31)
CREAT SERPL-MCNC: 0.33 MG/DL — LOW (ref 0.5–1.3)
EGFR: 106 ML/MIN/1.73M2 — SIGNIFICANT CHANGE UP
EOSINOPHIL # BLD AUTO: 0.17 K/UL — SIGNIFICANT CHANGE UP (ref 0–0.5)
EOSINOPHIL NFR BLD AUTO: 3.3 % — SIGNIFICANT CHANGE UP (ref 0–6)
ESTIMATED AVERAGE GLUCOSE: 134 MG/DL — HIGH (ref 68–114)
ESTIMATED AVERAGE GLUCOSE: 143 MG/DL — HIGH (ref 68–114)
GLUCOSE BLDC GLUCOMTR-MCNC: 107 MG/DL — HIGH (ref 70–99)
GLUCOSE BLDC GLUCOMTR-MCNC: 113 MG/DL — HIGH (ref 70–99)
GLUCOSE BLDC GLUCOMTR-MCNC: 126 MG/DL — HIGH (ref 70–99)
GLUCOSE BLDC GLUCOMTR-MCNC: 132 MG/DL — HIGH (ref 70–99)
GLUCOSE BLDC GLUCOMTR-MCNC: 153 MG/DL — HIGH (ref 70–99)
GLUCOSE SERPL-MCNC: 95 MG/DL — SIGNIFICANT CHANGE UP (ref 70–99)
HCT VFR BLD CALC: 36.9 % — SIGNIFICANT CHANGE UP (ref 34.5–45)
HGB BLD-MCNC: 12.1 G/DL — SIGNIFICANT CHANGE UP (ref 11.5–15.5)
IMM GRANULOCYTES NFR BLD AUTO: 0.2 % — SIGNIFICANT CHANGE UP (ref 0–0.9)
LYMPHOCYTES # BLD AUTO: 2.37 K/UL — SIGNIFICANT CHANGE UP (ref 1–3.3)
LYMPHOCYTES # BLD AUTO: 46.3 % — HIGH (ref 13–44)
MCHC RBC-ENTMCNC: 23.9 PG — LOW (ref 27–34)
MCHC RBC-ENTMCNC: 32.8 GM/DL — SIGNIFICANT CHANGE UP (ref 32–36)
MCV RBC AUTO: 72.8 FL — LOW (ref 80–100)
MONOCYTES # BLD AUTO: 0.51 K/UL — SIGNIFICANT CHANGE UP (ref 0–0.9)
MONOCYTES NFR BLD AUTO: 10 % — SIGNIFICANT CHANGE UP (ref 2–14)
NEUTROPHILS # BLD AUTO: 2 K/UL — SIGNIFICANT CHANGE UP (ref 1.8–7.4)
NEUTROPHILS NFR BLD AUTO: 39 % — LOW (ref 43–77)
NRBC # BLD: 0 /100 WBCS — SIGNIFICANT CHANGE UP (ref 0–0)
PLATELET # BLD AUTO: 276 K/UL — SIGNIFICANT CHANGE UP (ref 150–400)
POTASSIUM SERPL-MCNC: 3.7 MMOL/L — SIGNIFICANT CHANGE UP (ref 3.5–5.3)
POTASSIUM SERPL-SCNC: 3.7 MMOL/L — SIGNIFICANT CHANGE UP (ref 3.5–5.3)
RBC # BLD: 5.07 M/UL — SIGNIFICANT CHANGE UP (ref 3.8–5.2)
RBC # FLD: 15.5 % — HIGH (ref 10.3–14.5)
SODIUM SERPL-SCNC: 139 MMOL/L — SIGNIFICANT CHANGE UP (ref 135–145)
WBC # BLD: 5.12 K/UL — SIGNIFICANT CHANGE UP (ref 3.8–10.5)
WBC # FLD AUTO: 5.12 K/UL — SIGNIFICANT CHANGE UP (ref 3.8–10.5)

## 2023-08-01 PROCEDURE — 99222 1ST HOSP IP/OBS MODERATE 55: CPT

## 2023-08-01 PROCEDURE — 93306 TTE W/DOPPLER COMPLETE: CPT | Mod: 26

## 2023-08-01 RX ORDER — SODIUM HYPOCHLORITE 0.125 %
1 SOLUTION, NON-ORAL MISCELLANEOUS THREE TIMES A DAY
Refills: 0 | Status: DISCONTINUED | OUTPATIENT
Start: 2023-08-01 | End: 2023-08-03

## 2023-08-01 RX ADMIN — Medication 250 MILLIGRAM(S): at 02:03

## 2023-08-01 RX ADMIN — ENOXAPARIN SODIUM 40 MILLIGRAM(S): 100 INJECTION SUBCUTANEOUS at 05:43

## 2023-08-01 RX ADMIN — ATORVASTATIN CALCIUM 20 MILLIGRAM(S): 80 TABLET, FILM COATED ORAL at 21:46

## 2023-08-01 RX ADMIN — MEROPENEM 100 MILLIGRAM(S): 1 INJECTION INTRAVENOUS at 10:43

## 2023-08-01 RX ADMIN — MEROPENEM 100 MILLIGRAM(S): 1 INJECTION INTRAVENOUS at 00:07

## 2023-08-01 RX ADMIN — GABAPENTIN 200 MILLIGRAM(S): 400 CAPSULE ORAL at 05:43

## 2023-08-01 RX ADMIN — Medication 500 MILLIGRAM(S): at 12:05

## 2023-08-01 RX ADMIN — AMLODIPINE BESYLATE 5 MILLIGRAM(S): 2.5 TABLET ORAL at 05:43

## 2023-08-01 RX ADMIN — Medication 250 MILLIGRAM(S): at 18:38

## 2023-08-01 RX ADMIN — POLYETHYLENE GLYCOL 3350 17 GRAM(S): 17 POWDER, FOR SOLUTION ORAL at 12:05

## 2023-08-01 RX ADMIN — Medication 1 APPLICATION(S): at 22:42

## 2023-08-01 RX ADMIN — GABAPENTIN 200 MILLIGRAM(S): 400 CAPSULE ORAL at 14:49

## 2023-08-01 RX ADMIN — MEROPENEM 100 MILLIGRAM(S): 1 INJECTION INTRAVENOUS at 21:42

## 2023-08-01 RX ADMIN — LACTULOSE 20 GRAM(S): 10 SOLUTION ORAL at 05:45

## 2023-08-01 RX ADMIN — GABAPENTIN 200 MILLIGRAM(S): 400 CAPSULE ORAL at 21:46

## 2023-08-01 NOTE — CONSULT NOTE ADULT - SUBJECTIVE AND OBJECTIVE BOX
Wound SURGERY CONSULT NOTE    HPI:  Patient is a 73yo [place] Female wheelchair bound with history of HTN, HLD, T2DM, DVT 2 year ago off anticoagulation, obesity, osteoarthritis, PAD, chronic sacral wound (2 years and 4 months per patient), recently started on Meropenem (7/24 for 6 weeks) and Vanco (7/26 for 4 weeks) via right PICC line, was sent in form outpatient cardiologist, Dr. Toney's office for the evaluation of tachycardia.  Patient reported to be tachycardic and "feels sick but cannot elaborate further" , then was subsequently found sinus tachycardia on EKG with history of DVT off anticoagulation, she was sent in to ED to evaluate for a pulmonary embolism.  Currently, patient is hungry, but no other symptoms.  She endorses headache and stomach problems/constipation intermittently, last bowel movement 4 days ago.  Patient had 1 episode of vomiting about 1 week ago, but was able to tolerate breakfast including orange juice and mashed potatoes this morning. (31 Jul 2023 21:29)      Wound consult requested by team to assist w/ management of sacral pressure injury.   Pt w/o  c/o pain, drainage, odor, or increased edema. Offloading and pericare initiated upon admission as pt Increasingly sedentary 2/2 to illness. Pt is Incontinent of urine & stool.  No h/o falls, trauma.  Appetite good. All questions asked and answered to pt's understanding and satisfaction.    Current Diet: Diet, Pureed:   Consistent Carbohydrate No Snacks (CSTCHO)  DASH/TLC Sodium & Cholesterol Restricted (DASH) (07-31-23 @ 23:49)      PAST MEDICAL & SURGICAL HISTORY:  HTN (hypertension)      HLD (hyperlipidemia)      DM (diabetes mellitus)      Chronic ulcer of sacral region      Anxiety      PAD (peripheral artery disease)      DVT, lower extremity      Osteoarthritis      Obesity      Macular degeneration      Ovarian cyst      S/P cataract surgery          REVIEW OF SYSTEMS:   General/ Breast/ Skin/Vasc/ Neuro/ MSK: see HPI  All other systems negative    MEDICATIONS  (STANDING):  amLODIPine   Tablet 5 milliGRAM(s) Oral daily  ascorbic acid 500 milliGRAM(s) Oral daily  atorvastatin 20 milliGRAM(s) Oral at bedtime  Dakins Solution - 1/4 Strength 1 Application(s) Topical three times a day  dextrose 5%. 1000 milliLiter(s) (50 mL/Hr) IV Continuous <Continuous>  dextrose 5%. 1000 milliLiter(s) (100 mL/Hr) IV Continuous <Continuous>  dextrose 50% Injectable 25 Gram(s) IV Push once  dextrose 50% Injectable 25 Gram(s) IV Push once  dextrose 50% Injectable 12.5 Gram(s) IV Push once  enoxaparin Injectable 40 milliGRAM(s) SubCutaneous every 24 hours  gabapentin 200 milliGRAM(s) Oral three times a day  glucagon  Injectable 1 milliGRAM(s) IntraMuscular once  insulin lispro (ADMELOG) corrective regimen sliding scale   SubCutaneous three times a day before meals  insulin lispro (ADMELOG) corrective regimen sliding scale   SubCutaneous at bedtime  meropenem  IVPB 1000 milliGRAM(s) IV Intermittent every 8 hours  meropenem  IVPB      polyethylene glycol 3350 17 Gram(s) Oral daily  vancomycin  IVPB      vancomycin  IVPB 750 milliGRAM(s) IV Intermittent every 12 hours    MEDICATIONS  (PRN):  acetaminophen     Tablet .. 650 milliGRAM(s) Oral every 6 hours PRN Temp greater or equal to 38C (100.4F), Mild Pain (1 - 3)  dextrose Oral Gel 15 Gram(s) Oral once PRN Blood Glucose LESS THAN 70 milliGRAM(s)/deciliter  lactulose Syrup 20 Gram(s) Oral daily PRN for constipation      Allergies    No Known Allergies    Intolerances        SOCIAL HISTORY:  , retired from working for a [employer] company  has one son, Ethan, and two grandsons   used to live at assisted living when able to ambulate with a cane but has been living at nursing home since wheelchair bound for over 3 years  no tobacco or alcohol use      FAMILY HISTORY:  No pertinent family history in first degree relatives      PHYSICAL EXAM:  Vital Signs Last 24 Hrs  T(C): 36.7 (01 Aug 2023 13:33), Max: 37.2 (01 Aug 2023 00:12)  T(F): 98 (01 Aug 2023 13:33), Max: 98.9 (01 Aug 2023 00:12)  HR: 88 (01 Aug 2023 13:33) (84 - 120)  BP: 129/76 (01 Aug 2023 13:33) (122/76 - 148/81)  BP(mean): 72 (31 Jul 2023 21:50) (72 - 72)  RR: 19 (01 Aug 2023 13:33) (16 - 19)  SpO2: 98% (01 Aug 2023 13:33) (95% - 100%)    Parameters below as of 01 Aug 2023 13:33  Patient On (Oxygen Delivery Method): room air        NAD,   A&Ox3/  Obese       HEENT:  Sclera clear, mucosa moist, throat clear, trachea midline, neck supple  Respiratory: nonlabored w/ equal chest rise  Gastrointestinal: soft NT/ND  : Attends absorbant pad  Neurology:  weakened strength & sensation grossly intact,  Psych: calm/ appropriate  Musculoskeletal:  no deformities/ contractures  Vascular: BLLE equally warm no cyanosis, clubbing, edema nor acute ischemia    BLLE edema equal    BLLE DP pulses palpable  Skin:  thin, dry, pale, frail,    Sacral region full thickness skin loss, loose, soft, brown/black eschar, malodorous 4cm x 6.7cm x 0.1cm    copious  serosanguinous drainage, (+)fluctuance, : No warmth, tenderness, induration, nor crepitus  Procedure Note  Using aseptic technique wound was assessed with limited excisional debridment using a scissor and forceps through necrotic/ nonviable dermis subcutaneous tissue and muscle.  Pt tolerated procedure well.  Hemostasis was maintained throughout.    Debulking debridement of necrotic tissue.    Pre measurements: 4cm x 6.7cm x 0.1cm  Post measurements: 4cm x 7cm x 2.5cm ------   undermining 12:00 -6:00 O'clock   4cm   at 12:00  4.5cm at 2:00  2.5cm at 6:00          LABS/ CULTURES/ RADIOLOGY:                        12.1   5.12  )-----------( 276      ( 01 Aug 2023 07:35 )             36.9       139  |  102  |  <4  ----------------------------<  95      [08-01-23 @ 07:35]  3.7   |  24  |  0.33        Ca     9.5     [08-01-23 @ 07:35]      Mg     2.0     [07-31-23 @ 16:45]    TPro  7.6  /  Alb  3.3  /  TBili  0.3  /  DBili  x   /  AST  28  /  ALT  46  /  AlkPhos  91  [07-31-23 @ 16:45]    PT/INR: PT 13.3 , INR 1.22       [07-31-23 @ 16:45]  PTT: 32.6       [07-31-23 @ 16:45]        A1C with Estimated Average Glucose Result: 6.3 % (08-01-23 @ 07:35)  A1C with Estimated Average Glucose Result: 6.6 % (07-31-23 @ 16:45)                             Wound SURGERY CONSULT NOTE    HPI:  Patient is a 75yo [place] Female wheelchair bound with history of HTN, HLD, T2DM, DVT 2 year ago off anticoagulation, obesity, osteoarthritis, PAD, chronic sacral wound (2 years and 4 months per patient), recently started on Meropenem (7/24 for 6 weeks) and Vanco (7/26 for 4 weeks) via right PICC line, was sent in form outpatient cardiologist, Dr. Toney's office for the evaluation of tachycardia.  Patient reported to be tachycardic and "feels sick but cannot elaborate further" , then was subsequently found sinus tachycardia on EKG with history of DVT off anticoagulation, she was sent in to ED to evaluate for a pulmonary embolism.  Currently, patient is hungry, but no other symptoms.  She endorses headache and stomach problems/constipation intermittently, last bowel movement 4 days ago.  Patient had 1 episode of vomiting about 1 week ago, but was able to tolerate breakfast including orange juice and mashed potatoes this morning. (31 Jul 2023 21:29)      Wound consult requested by team to assist w/ management of sacral pressure injury.   Pt w/o  c/o pain, drainage, odor, or increased edema. Offloading and pericare initiated upon admission as pt Increasingly sedentary 2/2 to illness. Pt is Incontinent of urine & stool.  No h/o falls, trauma.  Appetite good. All questions asked and answered to pt's understanding and satisfaction.    Current Diet: Diet, Pureed:   Consistent Carbohydrate No Snacks (CSTCHO)  DASH/TLC Sodium & Cholesterol Restricted (DASH) (07-31-23 @ 23:49)      PAST MEDICAL & SURGICAL HISTORY:  HTN (hypertension)      HLD (hyperlipidemia)      DM (diabetes mellitus)      Chronic ulcer of sacral region      Anxiety      PAD (peripheral artery disease)      DVT, lower extremity      Osteoarthritis      Obesity      Macular degeneration      Ovarian cyst      S/P cataract surgery          REVIEW OF SYSTEMS:   General/ Breast/ Skin/Vasc/ Neuro/ MSK: see HPI  All other systems negative    MEDICATIONS  (STANDING):  amLODIPine   Tablet 5 milliGRAM(s) Oral daily  ascorbic acid 500 milliGRAM(s) Oral daily  atorvastatin 20 milliGRAM(s) Oral at bedtime  Dakins Solution - 1/4 Strength 1 Application(s) Topical three times a day  dextrose 5%. 1000 milliLiter(s) (50 mL/Hr) IV Continuous <Continuous>  dextrose 5%. 1000 milliLiter(s) (100 mL/Hr) IV Continuous <Continuous>  dextrose 50% Injectable 25 Gram(s) IV Push once  dextrose 50% Injectable 25 Gram(s) IV Push once  dextrose 50% Injectable 12.5 Gram(s) IV Push once  enoxaparin Injectable 40 milliGRAM(s) SubCutaneous every 24 hours  gabapentin 200 milliGRAM(s) Oral three times a day  glucagon  Injectable 1 milliGRAM(s) IntraMuscular once  insulin lispro (ADMELOG) corrective regimen sliding scale   SubCutaneous three times a day before meals  insulin lispro (ADMELOG) corrective regimen sliding scale   SubCutaneous at bedtime  meropenem  IVPB 1000 milliGRAM(s) IV Intermittent every 8 hours  meropenem  IVPB      polyethylene glycol 3350 17 Gram(s) Oral daily  vancomycin  IVPB      vancomycin  IVPB 750 milliGRAM(s) IV Intermittent every 12 hours    MEDICATIONS  (PRN):  acetaminophen     Tablet .. 650 milliGRAM(s) Oral every 6 hours PRN Temp greater or equal to 38C (100.4F), Mild Pain (1 - 3)  dextrose Oral Gel 15 Gram(s) Oral once PRN Blood Glucose LESS THAN 70 milliGRAM(s)/deciliter  lactulose Syrup 20 Gram(s) Oral daily PRN for constipation      Allergies    No Known Allergies    Intolerances        SOCIAL HISTORY:  , retired from working for a [employer]  has one son, Ethan, and two grandsons   used to live at assisted living when able to ambulate with a cane but has been living at nursing home since wheelchair bound for over 3 years  no tobacco or alcohol use      FAMILY HISTORY:  No pertinent family history in first degree relatives      PHYSICAL EXAM:  Vital Signs Last 24 Hrs  T(C): 36.7 (01 Aug 2023 13:33), Max: 37.2 (01 Aug 2023 00:12)  T(F): 98 (01 Aug 2023 13:33), Max: 98.9 (01 Aug 2023 00:12)  HR: 88 (01 Aug 2023 13:33) (84 - 120)  BP: 129/76 (01 Aug 2023 13:33) (122/76 - 148/81)  BP(mean): 72 (31 Jul 2023 21:50) (72 - 72)  RR: 19 (01 Aug 2023 13:33) (16 - 19)  SpO2: 98% (01 Aug 2023 13:33) (95% - 100%)    Parameters below as of 01 Aug 2023 13:33  Patient On (Oxygen Delivery Method): room air        NAD,   A&Ox3/  Obese       HEENT:  Sclera clear, mucosa moist, throat clear, trachea midline, neck supple  Respiratory: nonlabored w/ equal chest rise  Gastrointestinal: soft NT/ND  : Attends absorbant pad  Neurology:  weakened strength & sensation grossly intact,  Psych: calm/ appropriate  Musculoskeletal:  no deformities/ contractures  Vascular: BLLE equally warm no cyanosis, clubbing, nor acute ischemia    BLLE edema equal    BLLE DP pulses palpable  Skin:  thin, dry, pale, frail,    Sacral region full thickness skin loss, loose, soft, brown/black eschar, malodorous 4cm x 6.7cm x 0.1cm    copious  serosanguinous drainage, (+)fluctuance, : No warmth, tenderness, induration, nor crepitus  Procedure Note  Using aseptic technique wound was assessed with limited excisional debridement using a scissor and forceps through necrotic/ nonviable dermis subcutaneous tissue and muscle.  Pt tolerated procedure well.  Hemostasis was maintained throughout.    Debulking debridement of necrotic tissue.    Pre measurements: 4cm x 6.7cm x 0.1cm  Post measurements: 4cm x 7cm x 2.5cm ------   undermining 12:00 -6:00 O'clock   4cm   at 12:00  4.5cm at 2:00  2.5cm at 6:00          LABS/ CULTURES/ RADIOLOGY:                        12.1   5.12  )-----------( 276      ( 01 Aug 2023 07:35 )             36.9       139  |  102  |  <4  ----------------------------<  95      [08-01-23 @ 07:35]  3.7   |  24  |  0.33        Ca     9.5     [08-01-23 @ 07:35]      Mg     2.0     [07-31-23 @ 16:45]    TPro  7.6  /  Alb  3.3  /  TBili  0.3  /  DBili  x   /  AST  28  /  ALT  46  /  AlkPhos  91  [07-31-23 @ 16:45]    PT/INR: PT 13.3 , INR 1.22       [07-31-23 @ 16:45]  PTT: 32.6       [07-31-23 @ 16:45]        A1C with Estimated Average Glucose Result: 6.3 % (08-01-23 @ 07:35)  A1C with Estimated Average Glucose Result: 6.6 % (07-31-23 @ 16:45)                             Wound SURGERY CONSULT NOTE    HPI:  Patient is a 73yo [place]er Female wheelchair bound with history of HTN, HLD, T2DM, DVT 2 year ago off anticoagulation, obesity, osteoarthritis, PAD, chronic sacral wound (2 years and 4 months per patient), recently started on Meropenem (7/24 for 6 weeks) and Vanco (7/26 for 4 weeks) via right PICC line, was sent in form outpatient cardiologist, Dr. Toney's office for the evaluation of tachycardia.  Patient reported to be tachycardic and "feels sick but cannot elaborate further" , then was subsequently found sinus tachycardia on EKG with history of DVT off anticoagulation, she was sent in to ED to evaluate for a pulmonary embolism.  Currently, patient is hungry, but no other symptoms.  She endorses headache and stomach problems/constipation intermittently, last bowel movement 4 days ago.  Patient had 1 episode of vomiting about 1 week ago, but was able to tolerate breakfast including orange juice and mashed potatoes this morning. (31 Jul 2023 21:29)      Wound consult requested by team to assist w/ management of sacral pressure injury.   Pt w/o  c/o pain, drainage, odor, or increased edema. Offloading and pericare initiated upon admission as pt Increasingly sedentary 2/2 to illness. Pt is Incontinent of urine & stool.  No h/o falls, trauma.  Appetite good. All questions asked and answered to pt's understanding and satisfaction.    Current Diet: Diet, Pureed:   Consistent Carbohydrate No Snacks (CSTCHO)  DASH/TLC Sodium & Cholesterol Restricted (DASH) (07-31-23 @ 23:49)      PAST MEDICAL & SURGICAL HISTORY:  HTN (hypertension)      HLD (hyperlipidemia)      DM (diabetes mellitus)      Chronic ulcer of sacral region      Anxiety      PAD (peripheral artery disease)      DVT, lower extremity      Osteoarthritis      Obesity      Macular degeneration      Ovarian cyst      S/P cataract surgery          REVIEW OF SYSTEMS:   General/ Breast/ Skin/Vasc/ Neuro/ MSK: see HPI  All other systems negative    MEDICATIONS  (STANDING):  amLODIPine   Tablet 5 milliGRAM(s) Oral daily  ascorbic acid 500 milliGRAM(s) Oral daily  atorvastatin 20 milliGRAM(s) Oral at bedtime  Dakins Solution - 1/4 Strength 1 Application(s) Topical three times a day  dextrose 5%. 1000 milliLiter(s) (50 mL/Hr) IV Continuous <Continuous>  dextrose 5%. 1000 milliLiter(s) (100 mL/Hr) IV Continuous <Continuous>  dextrose 50% Injectable 25 Gram(s) IV Push once  dextrose 50% Injectable 25 Gram(s) IV Push once  dextrose 50% Injectable 12.5 Gram(s) IV Push once  enoxaparin Injectable 40 milliGRAM(s) SubCutaneous every 24 hours  gabapentin 200 milliGRAM(s) Oral three times a day  glucagon  Injectable 1 milliGRAM(s) IntraMuscular once  insulin lispro (ADMELOG) corrective regimen sliding scale   SubCutaneous three times a day before meals  insulin lispro (ADMELOG) corrective regimen sliding scale   SubCutaneous at bedtime  meropenem  IVPB 1000 milliGRAM(s) IV Intermittent every 8 hours  meropenem  IVPB      polyethylene glycol 3350 17 Gram(s) Oral daily  vancomycin  IVPB      vancomycin  IVPB 750 milliGRAM(s) IV Intermittent every 12 hours    MEDICATIONS  (PRN):  acetaminophen     Tablet .. 650 milliGRAM(s) Oral every 6 hours PRN Temp greater or equal to 38C (100.4F), Mild Pain (1 - 3)  dextrose Oral Gel 15 Gram(s) Oral once PRN Blood Glucose LESS THAN 70 milliGRAM(s)/deciliter  lactulose Syrup 20 Gram(s) Oral daily PRN for constipation      Allergies    No Known Allergies    Intolerances        SOCIAL HISTORY:  , retired from working for a [employer]  has one son, Ethan, and two grandsons   used to live at assisted living when able to ambulate with a cane but has been living at nursing home since wheelchair bound for over 3 years  no tobacco or alcohol use      FAMILY HISTORY:  No pertinent family history in first degree relatives      PHYSICAL EXAM:  Vital Signs Last 24 Hrs  T(C): 36.7 (01 Aug 2023 13:33), Max: 37.2 (01 Aug 2023 00:12)  T(F): 98 (01 Aug 2023 13:33), Max: 98.9 (01 Aug 2023 00:12)  HR: 88 (01 Aug 2023 13:33) (84 - 120)  BP: 129/76 (01 Aug 2023 13:33) (122/76 - 148/81)  BP(mean): 72 (31 Jul 2023 21:50) (72 - 72)  RR: 19 (01 Aug 2023 13:33) (16 - 19)  SpO2: 98% (01 Aug 2023 13:33) (95% - 100%)    Parameters below as of 01 Aug 2023 13:33  Patient On (Oxygen Delivery Method): room air        NAD,   A&Ox3/  Obese  HEENT:  Sclera clear, mucosa moist, throat clear, trachea midline, neck supple  Respiratory: nonlabored w/ equal chest rise  Gastrointestinal: soft NT/ND  : Attends absorbant pad  Neurology:  weakened strength & sensation grossly intact,  Psych: calm/ appropriate  Musculoskeletal:  no deformities/ contractures  Vascular: BLLE equally warm no cyanosis, clubbing, nor acute ischemia    BLLE edema equal    BLLE DP pulses palpable  Skin:  thin, dry, pale, frail,    Sacral region loose, soft, brown/black eschar, malodorous 4cm x 6.7cm x 0.1cm    copious  serosanguinous drainage, (+)fluctuance, : No warmth, tenderness, induration, nor crepitus  Procedure Note  Using aseptic technique the sacral wound underwent selective excisional debridement using a scissor and forceps through necrotic/ nonviable epidermis, dermis, subcutaneous tissue and gluteal muscle.  Pt tolerated procedure well.  Hemostasis was maintained throughout.    Debulking debridement of necrotic tissue.    Pre measurements: 4cm x 6.7cm x 0.1cm  Post measurements: 4cm x 7cm x 2.5cm ------   undermining 12:00 -6:00 O'clock   4cm   at 12:00  4.5cm at 2:00  2.5cm at 6:00          LABS/ CULTURES/ RADIOLOGY:                        12.1   5.12  )-----------( 276      ( 01 Aug 2023 07:35 )             36.9       139  |  102  |  <4  ----------------------------<  95      [08-01-23 @ 07:35]  3.7   |  24  |  0.33        Ca     9.5     [08-01-23 @ 07:35]      Mg     2.0     [07-31-23 @ 16:45]    TPro  7.6  /  Alb  3.3  /  TBili  0.3  /  DBili  x   /  AST  28  /  ALT  46  /  AlkPhos  91  [07-31-23 @ 16:45]    PT/INR: PT 13.3 , INR 1.22       [07-31-23 @ 16:45]  PTT: 32.6       [07-31-23 @ 16:45]        A1C with Estimated Average Glucose Result: 6.3 % (08-01-23 @ 07:35)  A1C with Estimated Average Glucose Result: 6.6 % (07-31-23 @ 16:45)

## 2023-08-01 NOTE — CONSULT NOTE ADULT - ASSESSMENT
A/P: Patient is a 73yo [place] Female wheelchair bound with history of HTN, HLD, T2DM, DVT ,obesity, osteoarthritis, PAD, chronic sacral wound. recently started on Meropenem (7/24 for 6 weeks) and Vanco (7/26 for 4 weeks) via right PICC line.      Wound Consult requested to assist w/ management of  Stage 4 pressure injury    Recommendations:   Pressure injury: Dakins TID  -   Buttocks/  TRIAD BID /CAVILON ADVANCE TIW and prn soiling   -   Continue turning and positioning w/ offloading assistive devices as per protocol  -   Continue w/ attends under pads and Pericare as per protocol  -   Waffle Cushion to chair when oob to chair  -   Continue w/ low air loss pressure redistribution bed surface   This pressure injury is community acquired YES  Moisturize intact skin w/ SWEEN cream BID  Abx per Medicine/ ID  Nutrition Consult for optimization in pt w/ Increased nutritional needs  -   Encourage high quality protein, fely/ prosource, MVI & Vit C to promote wound healing  Pt will need Group 2 mattress on hospital bed and ROHO cushion for wheel chair upon discharge home  Care as per medicine, will follow w/ you and remain available as requested  Upon discharge f/u as outpatient at Wound Center [address] [place] [address] [phone]  Seen w/ attng & and D/w team & RN  Thank you for this consult  Anaid ODELL-BC, [facility] [phone]  Nights/ Weekends/ Holidays please call:  General Surgery Consult pager ([phone]) for emergencies  Wound PT for multilayer leg wrapping or VAC issues ([phone])  A/P: Patient is a 73yo [place] Female wheelchair bound with history of HTN, HLD, T2DM, DVT ,obesity, osteoarthritis, PAD, chronic sacral wound. Recently started on Meropenem (7/24 for 6 weeks) and Vanco (7/26 for 4 weeks) via right PICC line.      Wound Consult requested to assist w/ management of  Stage 4 pressure injury    Recommendations:   Pressure injury, Sacral : Dakins TID  -   Buttocks/  TRIAD BID /CAVILON ADVANCE TIW and prn soiling   -   Continue turning and positioning w/ offloading assistive devices as per protocol  -   Continue w/ attends under pads and Pericare as per protocol  -   Waffle Cushion to chair when oob to chair  -   Continue w/ low air loss pressure redistribution bed surface   This pressure injury is community acquired YES  Moisturize intact skin w/ SWEEN cream BID  Abx per Medicine/ ID  Nutrition Consult for optimization in pt w/ Increased nutritional needs  -   Encourage high quality protein, fely/ prosource, MVI & Vit C to promote wound healing  Pt will need Group 2 mattress on hospital bed and ROHO cushion for wheel chair upon discharge home  Care as per medicine, will follow w/ you and remain available as requested  Upon discharge f/u as outpatient at Wound Center [address] [place] [address] [phone]  Seen w/ attng & D/w team & RN  Thank you for this consult  Anaid ODELL-BC, [facility] [phone]  Nights/ Weekends/ Holidays please call:  General Surgery Consult pager ([phone]) for emergencies  Wound PT for multilayer leg wrapping or VAC issues (x [phone])

## 2023-08-01 NOTE — PROGRESS NOTE ADULT - NSPROGADDITIONALINFOA_GEN_ALL_CORE
discharge planning discussed with patient in detail, expresses understanding of treatment plans.  discussed with covering ACP

## 2023-08-01 NOTE — CONSULT NOTE ADULT - ASSESSMENT
Sinus tachycardia   better today   no evidence of acute MI or PE   possible cause due to infection from her sacral decub   on abx  obtain echo     abnormal EKG  obtain echo     HTN  stable    DM II  Monitor finger stick. Insulin coverage. Diabetic education and Diabetic diet. Consider nutrition consultation.     Advanced care planning was discussed with patient and family.  Risks, benefits and alternatives of the cardiac treatments and medical therapy including procedures were discussed in detail and all questions were answered. Importance of compliance with medical therapy and lifestyle modification to improve cardiovascular health were addressed. Appropriate forms and patient educational materials were reviewed. 30 minutes face to face spent.

## 2023-08-01 NOTE — PATIENT PROFILE ADULT - NSPROGENBLOODRESTRICT_GEN_A_NUR
Patient is a 74 yo F who underwent R modified radical mastectomy 8/26/19 for an occult right breast cancer, who was also diagnosed with pulm emboli in May 2019 and has been on therapeutic lovenox perioperatively. Represented on 9/1 with R breast hematoma s/p hematoma washout on 9/2. Required 2 uPRBC preoperatively, but has been hemodynamically stable post op.    Regular diabetic diet   Home medications. Lovenox started today  SCDs and Lovenox for dvt ppx   Continue to monitor drain output closely- output currently serosanguinous. If change in character will repeat CBC  CBC, CMP, Mag and Phos daily   PRN pain medications  Replete lytes PRN   Encourage ambulation   Likely discharge home today. Will evaluate this afternoon and if she looks good, d/c home     none

## 2023-08-01 NOTE — CONSULT NOTE ADULT - SUBJECTIVE AND OBJECTIVE BOX
CHIEF COMPLAINT:Patient is a 77y old  Female who presents with a chief complaint of tachycardia (31 Jul 2023 21:29)      HISTORY OF PRESENT ILLNESS:    This is a pleasant 75 y/o F Hx of DM2, HTN, HLD, prior DVT off AC, obesity, OA, chronic right sacral decubitus ulcer recently started on Abx presented with tachycardia to my office for initial visit yesterday sent to ED for work up.  CTA without central pulmonary embolus , or significant anemia   feels better today   No chest pain, dyspnea, palpitation, or dizziness.     PAST MEDICAL & SURGICAL HISTORY:  HTN (hypertension)      HLD (hyperlipidemia)      DM (diabetes mellitus)      Chronic ulcer of sacral region      Anxiety      PAD (peripheral artery disease)      DVT, lower extremity      Osteoarthritis      Obesity      Macular degeneration      Ovarian cyst      S/P cataract surgery              MEDICATIONS:  amLODIPine   Tablet 5 milliGRAM(s) Oral daily  enoxaparin Injectable 40 milliGRAM(s) SubCutaneous every 24 hours    meropenem  IVPB      meropenem  IVPB 1000 milliGRAM(s) IV Intermittent every 8 hours  vancomycin  IVPB      vancomycin  IVPB 750 milliGRAM(s) IV Intermittent every 12 hours      acetaminophen     Tablet .. 650 milliGRAM(s) Oral every 6 hours PRN  gabapentin 200 milliGRAM(s) Oral three times a day    lactulose Syrup 20 Gram(s) Oral daily PRN  polyethylene glycol 3350 17 Gram(s) Oral daily    atorvastatin 20 milliGRAM(s) Oral at bedtime  dextrose 50% Injectable 25 Gram(s) IV Push once  dextrose 50% Injectable 25 Gram(s) IV Push once  dextrose 50% Injectable 12.5 Gram(s) IV Push once  dextrose Oral Gel 15 Gram(s) Oral once PRN  glucagon  Injectable 1 milliGRAM(s) IntraMuscular once  insulin lispro (ADMELOG) corrective regimen sliding scale   SubCutaneous three times a day before meals  insulin lispro (ADMELOG) corrective regimen sliding scale   SubCutaneous at bedtime    ascorbic acid 500 milliGRAM(s) Oral daily  dextrose 5%. 1000 milliLiter(s) IV Continuous <Continuous>  dextrose 5%. 1000 milliLiter(s) IV Continuous <Continuous>      FAMILY HISTORY:  No pertinent family history in first degree relatives        Non-contributory    SOCIAL HISTORY:    No tobacco, drugs or etoh    Allergies    No Known Allergies    Intolerances    	    REVIEW OF SYSTEMS:  as above  The rest of the 14 points ROS reviewed and except above they are unremarkable.        PHYSICAL EXAM:  T(C): 36.6 (08-01-23 @ 05:30), Max: 37.2 (08-01-23 @ 00:12)  HR: 92 (08-01-23 @ 05:30) (84 - 120)  BP: 122/76 (08-01-23 @ 05:30) (122/76 - 148/81)  RR: 18 (08-01-23 @ 05:30) (16 - 18)  SpO2: 99% (08-01-23 @ 05:30) (95% - 100%)  Wt(kg): --  I&O's Summary  JVP: Normal  Neck: supple  Lung: clear   CV: S1 S2 , Murmur:  Abd: soft  Ext: No edema  neuro: Awake / alert  Psych: flat affect      LABS/DATA:    TELEMETRY: 	  sinus   ECG:  	   	  CARDIAC MARKERS:    < from: CT Angio Chest PE Protocol w/ IV Cont (07.31.23 @ 19:07) >    IMPRESSION:  No pulmonary embolus is noted within the main, right and left main and   lobar pulmonary arteries bilaterally. Evaluation of the segmental and   subsegmental pulmonary arteries bilaterally is limited.    Probable decubitus ulcer in the right buttock as described above.    --- End of Report ---    < end of copied text >                      11 <<== 07-31-23 @ 16:45                              12.1   5.12  )-----------( 276      ( 01 Aug 2023 07:35 )             36.9     07-31    141  |  101  |  6<L>  ----------------------------<  99  3.6   |  22  |  0.44<L>    Ca    9.6      31 Jul 2023 16:45  Mg     2.0     07-31    TPro  7.6  /  Alb  3.3  /  TBili  0.3  /  DBili  x   /  AST  28  /  ALT  46<H>  /  AlkPhos  91  07-31    proBNP:   Lipid Profile:   HgA1c:   TSH: Thyroid Stimulating Hormone, Serum: 3.05 uIU/mL (07-31 @ 16:45)

## 2023-08-01 NOTE — PATIENT PROFILE ADULT - FALL HARM RISK - HARM RISK INTERVENTIONS

## 2023-08-01 NOTE — CONSULT NOTE ADULT - NS ATTEND AMEND GEN_ALL_CORE FT
Pt seen and examined with ACP.  Assessment and plan reviewed and discussed.  Agree with above.    Status of wounds and treatment recommendations d/w  pt.  All questions answered.   Pt expressed understanding.     I spent 55  minutes face to face w/ this pt of which more than 50% of the time was spent counseling & coordinating care of this pt.

## 2023-08-02 LAB
CULTURE RESULTS: NO GROWTH — SIGNIFICANT CHANGE UP
GLUCOSE BLDC GLUCOMTR-MCNC: 106 MG/DL — HIGH (ref 70–99)
GLUCOSE BLDC GLUCOMTR-MCNC: 134 MG/DL — HIGH (ref 70–99)
GLUCOSE BLDC GLUCOMTR-MCNC: 135 MG/DL — HIGH (ref 70–99)
GLUCOSE BLDC GLUCOMTR-MCNC: 92 MG/DL — SIGNIFICANT CHANGE UP (ref 70–99)
SPECIMEN SOURCE: SIGNIFICANT CHANGE UP
VANCOMYCIN TROUGH SERPL-MCNC: 5.5 UG/ML — LOW (ref 10–20)

## 2023-08-02 RX ORDER — CHLORHEXIDINE GLUCONATE 213 G/1000ML
1 SOLUTION TOPICAL DAILY
Refills: 0 | Status: DISCONTINUED | OUTPATIENT
Start: 2023-08-02 | End: 2023-08-06

## 2023-08-02 RX ORDER — METOPROLOL TARTRATE 50 MG
25 TABLET ORAL DAILY
Refills: 0 | Status: DISCONTINUED | OUTPATIENT
Start: 2023-08-02 | End: 2023-08-06

## 2023-08-02 RX ADMIN — Medication 250 MILLIGRAM(S): at 22:41

## 2023-08-02 RX ADMIN — MEROPENEM 100 MILLIGRAM(S): 1 INJECTION INTRAVENOUS at 15:04

## 2023-08-02 RX ADMIN — GABAPENTIN 200 MILLIGRAM(S): 400 CAPSULE ORAL at 15:05

## 2023-08-02 RX ADMIN — POLYETHYLENE GLYCOL 3350 17 GRAM(S): 17 POWDER, FOR SOLUTION ORAL at 15:06

## 2023-08-02 RX ADMIN — AMLODIPINE BESYLATE 5 MILLIGRAM(S): 2.5 TABLET ORAL at 05:26

## 2023-08-02 RX ADMIN — Medication 1 APPLICATION(S): at 05:25

## 2023-08-02 RX ADMIN — MEROPENEM 100 MILLIGRAM(S): 1 INJECTION INTRAVENOUS at 22:03

## 2023-08-02 RX ADMIN — CHLORHEXIDINE GLUCONATE 1 APPLICATION(S): 213 SOLUTION TOPICAL at 19:06

## 2023-08-02 RX ADMIN — GABAPENTIN 200 MILLIGRAM(S): 400 CAPSULE ORAL at 05:24

## 2023-08-02 RX ADMIN — ENOXAPARIN SODIUM 40 MILLIGRAM(S): 100 INJECTION SUBCUTANEOUS at 05:25

## 2023-08-02 RX ADMIN — Medication 1 APPLICATION(S): at 21:53

## 2023-08-02 RX ADMIN — MEROPENEM 100 MILLIGRAM(S): 1 INJECTION INTRAVENOUS at 05:23

## 2023-08-02 RX ADMIN — Medication 500 MILLIGRAM(S): at 15:05

## 2023-08-02 RX ADMIN — Medication 250 MILLIGRAM(S): at 06:52

## 2023-08-02 RX ADMIN — ATORVASTATIN CALCIUM 20 MILLIGRAM(S): 80 TABLET, FILM COATED ORAL at 21:53

## 2023-08-02 RX ADMIN — Medication 1 APPLICATION(S): at 19:06

## 2023-08-02 RX ADMIN — GABAPENTIN 200 MILLIGRAM(S): 400 CAPSULE ORAL at 21:52

## 2023-08-02 NOTE — CHART NOTE - NSCHARTNOTEFT_GEN_A_CORE
74 year old Female wheelchair bound with history of HTN, HLD, T2DM, DVT ,obesity, osteoarthritis, PAD, chronic sacral wound. Recently started on Meropenem (7/24 for 6 weeks) and Vanco (7/26 for 4 weeks) via right PICC line, presented with tachycardia. CTA without central pulmonary embolus admitted for further cardiac work up. Continuing IV meropenem and IV vanco for sacral wound infection, wound care following.     Concern for PICC line infection, as patient presented with Rt PICC line in place on arrival. Patient seen and examined, Rt PICC line dressing clean dry and intact without surrounding erythema/induration or purulence noted. Mild TTP at insertion site. Afebrile, VSS and without complaints. Discussed with Dr. Siddiqui, who evaluated PICC site at bedside, no evidence of infection at this time. PICC RN called, who advised clearance to use PICC. Dr. Siddiqui in agreement. CXR 7/31 confirms Rt PICC placement, findings below.     Chest XR:  FINDINGS:  Right PICC with tip in SVC.  The heart size is not accurately assessed on this projection.  The lungs are clear. Low lung volumes.  There is no pneumothorax or pleural effusion.    Will place order OK to use Rt PICC at this time. Continue to monitor site for signs of infection. Monitor vitals. RN aware.     Cheryl Shetty PA-C 74 year old Female wheelchair bound with history of HTN, HLD, T2DM, DVT ,obesity, osteoarthritis, PAD, chronic sacral wound. Recently started on Meropenem (7/24 for 6 weeks) and Vanco (7/26 for 4 weeks) via right PICC line, presented with tachycardia. CTA without central pulmonary embolus admitted for further cardiac work up. Continuing IV meropenem and IV vanco for sacral wound infection, wound care following.     Concern for PICC line infection, as patient presented with Rt PICC line in place on arrival. Patient seen and examined, Rt PICC line dressing clean dry and intact without surrounding erythema/induration or purulence noted. Mild TTP at insertion site. Afebrile, VSS and without complaints. Discussed with Dr. Siddiqui, who evaluated PICC site at bedside, no evidence of infection at this time. PICC RN called, who advised clearance to use PICC. Dr. Siddiqui in agreement. CXR 7/31 confirms Rt PICC placement, findings below.     Chest XR:  FINDINGS:  Right PICC with tip in SVC.  The heart size is not accurately assessed on this projection.  The lungs are clear. Low lung volumes.  There is no pneumothorax or pleural effusion.    Will place order OK to use Rt PICC at this time. Continue to monitor site for signs of infection. Monitor vitals and for symptom development. RN aware.     Cheryl Shetty PA-C

## 2023-08-02 NOTE — PROGRESS NOTE ADULT - NSPROGADDITIONALINFOA_GEN_ALL_CORE
discussed with patient  left text message for son Ethan who did not answer his phone and his mail box is full. I could not leave a message

## 2023-08-02 NOTE — PROVIDER CONTACT NOTE (OTHER) - ASSESSMENT
The dressing of the PICC line is dry and in tact. No redness, swelling or any sign of infection is noted.

## 2023-08-03 LAB
ANION GAP SERPL CALC-SCNC: 20 MMOL/L — HIGH (ref 5–17)
BUN SERPL-MCNC: 7 MG/DL — SIGNIFICANT CHANGE UP (ref 7–23)
CALCIUM SERPL-MCNC: 9.9 MG/DL — SIGNIFICANT CHANGE UP (ref 8.4–10.5)
CHLORIDE SERPL-SCNC: 106 MMOL/L — SIGNIFICANT CHANGE UP (ref 96–108)
CO2 SERPL-SCNC: 21 MMOL/L — LOW (ref 22–31)
CREAT SERPL-MCNC: 0.44 MG/DL — LOW (ref 0.5–1.3)
EGFR: 99 ML/MIN/1.73M2 — SIGNIFICANT CHANGE UP
GLUCOSE BLDC GLUCOMTR-MCNC: 109 MG/DL — HIGH (ref 70–99)
GLUCOSE BLDC GLUCOMTR-MCNC: 123 MG/DL — HIGH (ref 70–99)
GLUCOSE BLDC GLUCOMTR-MCNC: 167 MG/DL — HIGH (ref 70–99)
GLUCOSE BLDC GLUCOMTR-MCNC: 168 MG/DL — HIGH (ref 70–99)
GLUCOSE SERPL-MCNC: 104 MG/DL — HIGH (ref 70–99)
HCT VFR BLD CALC: 35.7 % — SIGNIFICANT CHANGE UP (ref 34.5–45)
HGB BLD-MCNC: 11.7 G/DL — SIGNIFICANT CHANGE UP (ref 11.5–15.5)
MAGNESIUM SERPL-MCNC: 2.4 MG/DL — SIGNIFICANT CHANGE UP (ref 1.6–2.6)
MCHC RBC-ENTMCNC: 24 PG — LOW (ref 27–34)
MCHC RBC-ENTMCNC: 32.8 GM/DL — SIGNIFICANT CHANGE UP (ref 32–36)
MCV RBC AUTO: 73.3 FL — LOW (ref 80–100)
MRSA PCR RESULT.: SIGNIFICANT CHANGE UP
NRBC # BLD: 0 /100 WBCS — SIGNIFICANT CHANGE UP (ref 0–0)
PLATELET # BLD AUTO: 263 K/UL — SIGNIFICANT CHANGE UP (ref 150–400)
POTASSIUM SERPL-MCNC: 3.4 MMOL/L — LOW (ref 3.5–5.3)
POTASSIUM SERPL-SCNC: 3.4 MMOL/L — LOW (ref 3.5–5.3)
RBC # BLD: 4.87 M/UL — SIGNIFICANT CHANGE UP (ref 3.8–5.2)
RBC # FLD: 15.3 % — HIGH (ref 10.3–14.5)
S AUREUS DNA NOSE QL NAA+PROBE: SIGNIFICANT CHANGE UP
SODIUM SERPL-SCNC: 147 MMOL/L — HIGH (ref 135–145)
VANCOMYCIN TROUGH SERPL-MCNC: 6.6 UG/ML — LOW (ref 10–20)
VANCOMYCIN TROUGH SERPL-MCNC: 8.1 UG/ML — LOW (ref 10–20)
WBC # BLD: 4.06 K/UL — SIGNIFICANT CHANGE UP (ref 3.8–10.5)
WBC # FLD AUTO: 4.06 K/UL — SIGNIFICANT CHANGE UP (ref 3.8–10.5)

## 2023-08-03 PROCEDURE — 99232 SBSQ HOSP IP/OBS MODERATE 35: CPT | Mod: 25

## 2023-08-03 PROCEDURE — 97597 DBRDMT OPN WND 1ST 20 CM/<: CPT

## 2023-08-03 PROCEDURE — 97605 NEG PRS WND THER DME<=50SQCM: CPT | Mod: 59

## 2023-08-03 RX ORDER — VANCOMYCIN HCL 1 G
1000 VIAL (EA) INTRAVENOUS ONCE
Refills: 0 | Status: COMPLETED | OUTPATIENT
Start: 2023-08-03 | End: 2023-08-03

## 2023-08-03 RX ORDER — POTASSIUM CHLORIDE 20 MEQ
20 PACKET (EA) ORAL ONCE
Refills: 0 | Status: COMPLETED | OUTPATIENT
Start: 2023-08-03 | End: 2023-08-03

## 2023-08-03 RX ORDER — VANCOMYCIN HCL 1 G
1000 VIAL (EA) INTRAVENOUS EVERY 12 HOURS
Refills: 0 | Status: DISCONTINUED | OUTPATIENT
Start: 2023-08-03 | End: 2023-08-06

## 2023-08-03 RX ADMIN — GABAPENTIN 200 MILLIGRAM(S): 400 CAPSULE ORAL at 21:40

## 2023-08-03 RX ADMIN — Medication 250 MILLIGRAM(S): at 21:53

## 2023-08-03 RX ADMIN — Medication 1 APPLICATION(S): at 05:25

## 2023-08-03 RX ADMIN — MEROPENEM 100 MILLIGRAM(S): 1 INJECTION INTRAVENOUS at 23:04

## 2023-08-03 RX ADMIN — CHLORHEXIDINE GLUCONATE 1 APPLICATION(S): 213 SOLUTION TOPICAL at 13:20

## 2023-08-03 RX ADMIN — MEROPENEM 100 MILLIGRAM(S): 1 INJECTION INTRAVENOUS at 05:23

## 2023-08-03 RX ADMIN — MEROPENEM 100 MILLIGRAM(S): 1 INJECTION INTRAVENOUS at 13:03

## 2023-08-03 RX ADMIN — ATORVASTATIN CALCIUM 20 MILLIGRAM(S): 80 TABLET, FILM COATED ORAL at 21:40

## 2023-08-03 RX ADMIN — POLYETHYLENE GLYCOL 3350 17 GRAM(S): 17 POWDER, FOR SOLUTION ORAL at 13:03

## 2023-08-03 RX ADMIN — Medication 1: at 13:02

## 2023-08-03 RX ADMIN — Medication 20 MILLIEQUIVALENT(S): at 16:03

## 2023-08-03 RX ADMIN — Medication 250 MILLIGRAM(S): at 08:22

## 2023-08-03 RX ADMIN — ENOXAPARIN SODIUM 40 MILLIGRAM(S): 100 INJECTION SUBCUTANEOUS at 05:25

## 2023-08-03 RX ADMIN — AMLODIPINE BESYLATE 5 MILLIGRAM(S): 2.5 TABLET ORAL at 05:24

## 2023-08-03 RX ADMIN — GABAPENTIN 200 MILLIGRAM(S): 400 CAPSULE ORAL at 05:24

## 2023-08-03 RX ADMIN — Medication 25 MILLIGRAM(S): at 05:24

## 2023-08-03 RX ADMIN — GABAPENTIN 200 MILLIGRAM(S): 400 CAPSULE ORAL at 16:03

## 2023-08-03 RX ADMIN — Medication 500 MILLIGRAM(S): at 13:03

## 2023-08-03 NOTE — DIETITIAN INITIAL EVALUATION ADULT - NSICDXPASTMEDICALHX_GEN_ALL_CORE_FT
PAST MEDICAL HISTORY:  Anxiety     Chronic ulcer of sacral region     DM (diabetes mellitus)     DVT, lower extremity     HLD (hyperlipidemia)     HTN (hypertension)     Macular degeneration     Obesity     Osteoarthritis     Ovarian cyst     PAD (peripheral artery disease)

## 2023-08-03 NOTE — DIETITIAN INITIAL EVALUATION ADULT - REASON FOR ADMISSION
Abdominal pain    Chart reviewed, events noted. This is a "74 [place] Female wheelchair bound with history of HTN, HLD, T2DM, DVT 2 year ago off anticoagulation, obesity, osteoarthritis, PAD, chronic sacral wound (2 years and 4 months per patient), recently started on Meropenem (7/24 for 6 weeks) and Vanco (7/26 for 4 weeks) via right PICC line, was sent in form outpatient cardiologist, Dr. Toney's office for the evaluation of tachycardia.  Patient reported to be tachycardic and "feels sick but cannot elaborate further" , then was subsequently found sinus tachycardia on EKG with history of DVT off anticoagulation, she was sent in to ED to evaluate for a pulmonary embolism.  Currently, patient is hungry, but no other symptoms.  She endorses headache and stomach problems/constipation intermittently, last bowel movement 4 days ago."

## 2023-08-03 NOTE — PHYSICAL THERAPY INITIAL EVALUATION ADULT - ADDITIONAL COMMENTS
Prior to admission patient dependent with functional mobility and ADLs resides as a longterm care patient in a nursing home

## 2023-08-03 NOTE — PHYSICAL THERAPY INITIAL EVALUATION ADULT - MODALITIES TREATMENT COMMENTS
Pt seen for PT owundcare eval for vac application for Right buttock wound [id number], circumfrential underminig deepest at 2:00 4.5cm. 75% pink viable granular tissue 25% slough, debrided as able. medihoney applied to woundbase, VAC with black sponge to 125mmHg, continuous therapy, good seal.

## 2023-08-03 NOTE — DIETITIAN INITIAL EVALUATION ADULT - PROBLEM SELECTOR PLAN 1
with Q waves III, aVF, V3-V5 in diabetic patient  - CTA without central PE  - Tele monitor for arrythmia, troponin 11, TSH 3.05, Lipase 36, CT A/P without acute pathology for abdominal pain  - lactate on presentation 4.6, improved to 2.7 with 1L LR bolus   - tachycardia improved from 120 to 90 with 1L IVF  - will check TTE to evaluate for structural heart disease, wall motion abnormalities and LV EF%  - Cardiology outpatient note appreciated

## 2023-08-03 NOTE — PHYSICAL THERAPY INITIAL EVALUATION ADULT - PERTINENT HX OF CURRENT PROBLEM, REHAB EVAL
77F with HTN, HLD, T2DM, , prior DVT off AC, Obesity, OA, chronic sacral wound on vanc Presenting to the emergency patient cardiac presents to the ED from an outpatient cardiologist Dr. Cole office for the evaluation of tachycardia -sent here for abnormal EKG for evaluation of a pulmonary embolism .  Patient reports chronic constipation last bowel movement 4 days ago.  1 episode of vomiting about 1 week ago. Dx: Sinus Tachycardia -neg PE, Sacral Ulcer: Vanco and Josefa iv

## 2023-08-03 NOTE — DIETITIAN INITIAL EVALUATION ADULT - NSFNSPHYEXAMSKINFT_GEN_A_CORE
Pressure Injury 1: sacrum, Unstageable per nursing flow sheets   per wound care note on 8/2 pt noted with Stage 4 pressure injury to sacrum

## 2023-08-03 NOTE — DIETITIAN INITIAL EVALUATION ADULT - NS FNS DIET ORDER
Diet, Easy to Chew:   Consistent Carbohydrate {No Snacks} (CSTCHO)  DASH/TLC {Sodium & Cholesterol Restricted} (DASH) (08-02-23 @ 14:35)

## 2023-08-03 NOTE — DIETITIAN INITIAL EVALUATION ADULT - ADD RECOMMEND
1) Defer diet texture/consistency to team/ Speech Language Pathologist. Continue DASH + carbohydrate consistent diet. 2) Recommend addition of Glucerna 1x daily to supplement PO intake as needed. 3) Recommend Ronnie BID + multivitamin + Ascorbic acid daily to aid with wound healing. 4) RD to remain available and follow-up as medically appropriate.  1) Defer diet texture/consistency to team/ Speech Language Pathologist. Continue DASH + carbohydrate consistent diet. 2) Recommend addition of Glucerna 1x daily to supplement PO intake as needed. 3) Recommend Ronnie BID + multivitamin daily and continue ascorbic acid. to aid with wound healing. 4) RD to remain available and follow-up as medically appropriate.

## 2023-08-03 NOTE — DIETITIAN INITIAL EVALUATION ADULT - PERTINENT MEDS FT
MEDICATIONS  (STANDING):  amLODIPine   Tablet 5 milliGRAM(s) Oral daily  ascorbic acid 500 milliGRAM(s) Oral daily  atorvastatin 20 milliGRAM(s) Oral at bedtime  chlorhexidine 2% Cloths 1 Application(s) Topical daily  Dakins Solution - 1/4 Strength 1 Application(s) Topical three times a day  dextrose 5%. 1000 milliLiter(s) (100 mL/Hr) IV Continuous <Continuous>  dextrose 5%. 1000 milliLiter(s) (50 mL/Hr) IV Continuous <Continuous>  dextrose 50% Injectable 25 Gram(s) IV Push once  dextrose 50% Injectable 12.5 Gram(s) IV Push once  dextrose 50% Injectable 25 Gram(s) IV Push once  enoxaparin Injectable 40 milliGRAM(s) SubCutaneous every 24 hours  gabapentin 200 milliGRAM(s) Oral three times a day  glucagon  Injectable 1 milliGRAM(s) IntraMuscular once  insulin lispro (ADMELOG) corrective regimen sliding scale   SubCutaneous three times a day before meals  insulin lispro (ADMELOG) corrective regimen sliding scale   SubCutaneous at bedtime  meropenem  IVPB      meropenem  IVPB 1000 milliGRAM(s) IV Intermittent every 8 hours  metoprolol succinate ER 25 milliGRAM(s) Oral daily  polyethylene glycol 3350 17 Gram(s) Oral daily  vancomycin  IVPB 1000 milliGRAM(s) IV Intermittent every 12 hours    MEDICATIONS  (PRN):  acetaminophen     Tablet .. 650 milliGRAM(s) Oral every 6 hours PRN Temp greater or equal to 38C (100.4F), Mild Pain (1 - 3)  dextrose Oral Gel 15 Gram(s) Oral once PRN Blood Glucose LESS THAN 70 milliGRAM(s)/deciliter  lactulose Syrup 20 Gram(s) Oral daily PRN for constipation

## 2023-08-03 NOTE — DIETITIAN INITIAL EVALUATION ADULT - ENERGY INTAKE
Adequate (%) In-house pt reports appetite and PO intake fluctuations depending on preference to meal. Consumed most of lunch this afternoon. Per nursing flow sheets pt consuming % of meals. Pt denies any acute GI distress. Patient with no nutrition-related questions at this time. Made aware RD remains available as needed.

## 2023-08-03 NOTE — DIETITIAN INITIAL EVALUATION ADULT - OTHER INFO
Weight: Pt endorses some weight loss but unable to provide time frame, denies rapid weight loss. States used to ~ 224lbs. Current dosing weight is 179.5lbs. No additional weights to assess, will continue to monitor.

## 2023-08-03 NOTE — DIETITIAN INITIAL EVALUATION ADULT - EDUCATION DIETARY MODIFICATIONS
importance if protein intake for wound healing, avoidance of concentrated sweets. discussed purpose of nutrition supplements and encourage consumption once ordered./(1) partially meets; needs review/practice/verbalization

## 2023-08-03 NOTE — DIETITIAN INITIAL EVALUATION ADULT - PERTINENT LABORATORY DATA
08-03    147<H>  |  106  |  7   ----------------------------<  104<H>  3.4<L>   |  21<L>  |  0.44<L>    Ca    9.9      03 Aug 2023 07:18  Mg     2.4     08-03    POCT Blood Glucose.: 167 mg/dL (08-03-23 @ 12:24)  A1C with Estimated Average Glucose Result: 6.3 % (08-01-23 @ 07:35)  A1C with Estimated Average Glucose Result: 6.6 % (07-31-23 @ 16:45)

## 2023-08-03 NOTE — DIETITIAN INITIAL EVALUATION ADULT - ORAL INTAKE PTA/DIET HISTORY
Pt reports good PO intake and appetite at baseline PTA, consumes regular diet with no restrictions, reports she resides in nursing home. States she consumes a lot of fish. Drinks Glucerna shakes regularly.  Pt denies chewing/swallowing difficulty, nausea, vomiting, diarrhea. Reports constipation PTA. NKFA

## 2023-08-04 ENCOUNTER — TRANSCRIPTION ENCOUNTER (OUTPATIENT)
Age: 78
End: 2023-08-04

## 2023-08-04 LAB
ANION GAP SERPL CALC-SCNC: 11 MMOL/L — SIGNIFICANT CHANGE UP (ref 5–17)
BUN SERPL-MCNC: 9 MG/DL — SIGNIFICANT CHANGE UP (ref 7–23)
CALCIUM SERPL-MCNC: 9.5 MG/DL — SIGNIFICANT CHANGE UP (ref 8.4–10.5)
CHLORIDE SERPL-SCNC: 104 MMOL/L — SIGNIFICANT CHANGE UP (ref 96–108)
CO2 SERPL-SCNC: 26 MMOL/L — SIGNIFICANT CHANGE UP (ref 22–31)
CREAT SERPL-MCNC: 0.48 MG/DL — LOW (ref 0.5–1.3)
EGFR: 97 ML/MIN/1.73M2 — SIGNIFICANT CHANGE UP
GLUCOSE BLDC GLUCOMTR-MCNC: 107 MG/DL — HIGH (ref 70–99)
GLUCOSE BLDC GLUCOMTR-MCNC: 148 MG/DL — HIGH (ref 70–99)
GLUCOSE BLDC GLUCOMTR-MCNC: 208 MG/DL — HIGH (ref 70–99)
GLUCOSE BLDC GLUCOMTR-MCNC: 310 MG/DL — HIGH (ref 70–99)
GLUCOSE SERPL-MCNC: 92 MG/DL — SIGNIFICANT CHANGE UP (ref 70–99)
HCT VFR BLD CALC: 35.4 % — SIGNIFICANT CHANGE UP (ref 34.5–45)
HGB BLD-MCNC: 11.6 G/DL — SIGNIFICANT CHANGE UP (ref 11.5–15.5)
MCHC RBC-ENTMCNC: 24 PG — LOW (ref 27–34)
MCHC RBC-ENTMCNC: 32.8 GM/DL — SIGNIFICANT CHANGE UP (ref 32–36)
MCV RBC AUTO: 73.3 FL — LOW (ref 80–100)
NRBC # BLD: 0 /100 WBCS — SIGNIFICANT CHANGE UP (ref 0–0)
PLATELET # BLD AUTO: 258 K/UL — SIGNIFICANT CHANGE UP (ref 150–400)
POTASSIUM SERPL-MCNC: 3.9 MMOL/L — SIGNIFICANT CHANGE UP (ref 3.5–5.3)
POTASSIUM SERPL-SCNC: 3.9 MMOL/L — SIGNIFICANT CHANGE UP (ref 3.5–5.3)
RBC # BLD: 4.83 M/UL — SIGNIFICANT CHANGE UP (ref 3.8–5.2)
RBC # FLD: 15.5 % — HIGH (ref 10.3–14.5)
SODIUM SERPL-SCNC: 141 MMOL/L — SIGNIFICANT CHANGE UP (ref 135–145)
VANCOMYCIN TROUGH SERPL-MCNC: 7.1 UG/ML — LOW (ref 10–20)
WBC # BLD: 4.22 K/UL — SIGNIFICANT CHANGE UP (ref 3.8–10.5)
WBC # FLD AUTO: 4.22 K/UL — SIGNIFICANT CHANGE UP (ref 3.8–10.5)

## 2023-08-04 RX ADMIN — Medication 250 MILLIGRAM(S): at 09:40

## 2023-08-04 RX ADMIN — Medication 2: at 12:30

## 2023-08-04 RX ADMIN — ATORVASTATIN CALCIUM 20 MILLIGRAM(S): 80 TABLET, FILM COATED ORAL at 22:00

## 2023-08-04 RX ADMIN — AMLODIPINE BESYLATE 5 MILLIGRAM(S): 2.5 TABLET ORAL at 05:23

## 2023-08-04 RX ADMIN — GABAPENTIN 200 MILLIGRAM(S): 400 CAPSULE ORAL at 05:23

## 2023-08-04 RX ADMIN — ENOXAPARIN SODIUM 40 MILLIGRAM(S): 100 INJECTION SUBCUTANEOUS at 05:24

## 2023-08-04 RX ADMIN — CHLORHEXIDINE GLUCONATE 1 APPLICATION(S): 213 SOLUTION TOPICAL at 12:30

## 2023-08-04 RX ADMIN — GABAPENTIN 200 MILLIGRAM(S): 400 CAPSULE ORAL at 22:00

## 2023-08-04 RX ADMIN — GABAPENTIN 200 MILLIGRAM(S): 400 CAPSULE ORAL at 14:15

## 2023-08-04 RX ADMIN — Medication 25 MILLIGRAM(S): at 05:23

## 2023-08-04 RX ADMIN — Medication 500 MILLIGRAM(S): at 12:30

## 2023-08-04 RX ADMIN — MEROPENEM 100 MILLIGRAM(S): 1 INJECTION INTRAVENOUS at 06:57

## 2023-08-04 RX ADMIN — MEROPENEM 100 MILLIGRAM(S): 1 INJECTION INTRAVENOUS at 16:42

## 2023-08-04 RX ADMIN — Medication 2: at 22:00

## 2023-08-04 RX ADMIN — Medication 250 MILLIGRAM(S): at 21:59

## 2023-08-04 RX ADMIN — POLYETHYLENE GLYCOL 3350 17 GRAM(S): 17 POWDER, FOR SOLUTION ORAL at 12:30

## 2023-08-05 LAB
GLUCOSE BLDC GLUCOMTR-MCNC: 126 MG/DL — HIGH (ref 70–99)
GLUCOSE BLDC GLUCOMTR-MCNC: 137 MG/DL — HIGH (ref 70–99)
GLUCOSE BLDC GLUCOMTR-MCNC: 140 MG/DL — HIGH (ref 70–99)
GLUCOSE BLDC GLUCOMTR-MCNC: 187 MG/DL — HIGH (ref 70–99)
VANCOMYCIN TROUGH SERPL-MCNC: 8 UG/ML — LOW (ref 10–20)

## 2023-08-05 RX ADMIN — GABAPENTIN 200 MILLIGRAM(S): 400 CAPSULE ORAL at 05:29

## 2023-08-05 RX ADMIN — Medication 250 MILLIGRAM(S): at 09:10

## 2023-08-05 RX ADMIN — Medication 25 MILLIGRAM(S): at 05:29

## 2023-08-05 RX ADMIN — Medication 250 MILLIGRAM(S): at 19:54

## 2023-08-05 RX ADMIN — Medication 1: at 12:16

## 2023-08-05 RX ADMIN — MEROPENEM 100 MILLIGRAM(S): 1 INJECTION INTRAVENOUS at 00:21

## 2023-08-05 RX ADMIN — POLYETHYLENE GLYCOL 3350 17 GRAM(S): 17 POWDER, FOR SOLUTION ORAL at 11:17

## 2023-08-05 RX ADMIN — ATORVASTATIN CALCIUM 20 MILLIGRAM(S): 80 TABLET, FILM COATED ORAL at 21:38

## 2023-08-05 RX ADMIN — MEROPENEM 100 MILLIGRAM(S): 1 INJECTION INTRAVENOUS at 21:38

## 2023-08-05 RX ADMIN — Medication 500 MILLIGRAM(S): at 11:17

## 2023-08-05 RX ADMIN — CHLORHEXIDINE GLUCONATE 1 APPLICATION(S): 213 SOLUTION TOPICAL at 12:31

## 2023-08-05 RX ADMIN — GABAPENTIN 200 MILLIGRAM(S): 400 CAPSULE ORAL at 14:33

## 2023-08-05 RX ADMIN — ENOXAPARIN SODIUM 40 MILLIGRAM(S): 100 INJECTION SUBCUTANEOUS at 05:28

## 2023-08-05 RX ADMIN — MEROPENEM 100 MILLIGRAM(S): 1 INJECTION INTRAVENOUS at 06:54

## 2023-08-05 RX ADMIN — AMLODIPINE BESYLATE 5 MILLIGRAM(S): 2.5 TABLET ORAL at 08:48

## 2023-08-05 RX ADMIN — MEROPENEM 100 MILLIGRAM(S): 1 INJECTION INTRAVENOUS at 15:15

## 2023-08-05 RX ADMIN — GABAPENTIN 200 MILLIGRAM(S): 400 CAPSULE ORAL at 21:38

## 2023-08-05 NOTE — DISCHARGE NOTE PROVIDER - DID THE PATIENT PRESENT WITH OR WAS TREATED FOR MALNUTRITION DURING THIS ADMISSION
Cancelled.
Gala I spoke with patient and she stated she does not have time to have it done - she wants the order cancelled. OK to Cancel?
OK to cancel
No

## 2023-08-05 NOTE — DISCHARGE NOTE PROVIDER - CARE PROVIDER_API CALL
Evan Toney  Cardiovascular Disease  [address] [address]  Phone: [phone]  Fax: [phone]  Follow Up Time:

## 2023-08-05 NOTE — DISCHARGE NOTE PROVIDER - HOSPITAL COURSE
Hospital Course     75 y/o F Hx of DM2, HTN, HLD, prior DVT off AC, obesity, OA, chronic right sacral decubitus ulcer recently started on Abx presented with tachycardia, CTA without central pulmonary embolus admitted for further cardiac work up     Problem/Plan - 1:  ·  Problem: Sacral decubitus ulcer.   ·  Plan: continue with Vanco increased to 1000mg IV Q12 (7/26) and Meropenem 1gm IV Q8 (7/24) for sacral wound infection  Wound care evaluation appreciated  PT wound care for VAC noted  VAC placed.     Problem/Plan - 2:  ·  Problem: Sinus tachycardia.   ·  Plan: no intervention at this time  resolved   echocardiogram result noted.     Problem/Plan - 3:  ·  Problem: Constipation.   ·  Plan: will continue Lactulose and Miralax.     Problem/Plan - 4:  ·  Problem: Diabetes mellitus.   ·  Plan: no oral meds   - will monitor finger sticks and cover with insulin sliding scales  HbA1C 6.3  no oral meds.     Problem/Plan - 5:  ·  Problem: HTN (hypertension).   ·  Plan: continue Norvasc 5mg with holding parameters.     Problem/Plan - 6:  ·  Problem: HLD (hyperlipidemia).   ·  Plan: - will continue to treat with Atorvastatin.     Problem/Plan - 7:  ·  Problem: Prophylactic measure.   ·  Plan: Lovenox 40mg subcutaneous daily.     75 y/o F Hx of DM2, HTN, HLD, prior DVT off AC, obesity, OA, chronic right sacral decubitus ulcer recently started on Abx presented with tachycardia, CTA without central pulmonary embolus admitted for further cardiac work up       Problem/Plan - 1:  ·  Problem: Sacral decubitus ulcer.   ·  Plan: continue with Vanco increased to 1000mg IV Q12 (7/26) and Meropenem 1gm IV Q8 (7/24) for sacral wound   PT wound care for VAC noted  VAC placed  discharge planning.     Problem/Plan - 2:  ·  Problem: Sinus tachycardia.   ·  Plan: no intervention at this time  resolved   echocardiogram result noted.     Problem/Plan - 3:  ·  Problem: Constipation.   ·  Plan: will continue Lactulose and Miralax.     Problem/Plan - 4:  ·  Problem: Diabetes mellitus.   ·  Plan: resume home meds   will monitor finger sticks and cover with insulin sliding scales  HbA1C 6.3.     Problem/Plan - 5:  ·  Problem: HTN (hypertension).   ·  Plan: continue Norvasc 5mg with holding parameters.     Problem/Plan - 6:  ·  Problem: HLD (hyperlipidemia).   ·  Plan: will continue to treat with Atorvastatin.     Problem/Plan - 7:  ·  Problem: Prophylactic measure.   ·  Plan: Lovenox 40mg subcutaneous daily.       Additional Information:  Additional Information: discussed with case management   patient has an accepting facility   discharge to Subacute Rehab   75 y/o F Hx of DM2, HTN, HLD, prior DVT off AC, obesity, OA, chronic right sacral decubitus ulcer recently started on Abx presented with tachycardia, CTA without central pulmonary embolus admitted for further cardiac work up       Problem/Plan - 1:  ·  Problem: Sacral decubitus ulcer.   ·  Plan: continue with Vanco increased to 1000mg IV Q12 (7/26) and Meropenem 1gm IV Q8 (7/24) for sacral wound   PT wound care for VAC noted  VAC placed - wound vac in place. Patient with wound vac in hospital. pt will be transported with a wet to dry dressing to facility where they will place their wound vac.  discharge planning.     Problem/Plan - 2:  ·  Problem: Sinus tachycardia.   ·  Plan: no intervention at this time, continue with toprol 25 mg, once  a day   resolved   echocardiogram result noted.     Problem/Plan - 3:  ·  Problem: Constipation.   ·  Plan: will continue Lactulose and Miralax.     Problem/Plan - 4:  ·  Problem: Diabetes mellitus.   ·  Plan: resume home meds   will monitor finger sticks and cover with insulin sliding scales  HbA1C 6.3.     Problem/Plan - 5:  ·  Problem: HTN (hypertension).   ·  Plan: continue Norvasc 5mg with holding parameters.     Problem/Plan - 6:  ·  Problem: HLD (hyperlipidemia).   ·  Plan: will continue to treat with Atorvastatin.     Problem/Plan - 7:  ·  Problem: Prophylactic measure.   ·  Plan: Lovenox 40mg subcutaneous daily.       Additional Information:  Additional Information: discussed with case management   patient has an accepting facility      73 y/o F Hx of DM2, HTN, HLD, prior DVT off AC, obesity, OA, chronic right sacral decubitus ulcer recently started on Abx presented with tachycardia, CTA without central pulmonary embolus admitted for further cardiac work up       Problem/Plan - 1:  ·  Problem: Sacral decubitus ulcer.   ·  Plan: continue with Vanco increased to 1000mg IV Q12 thru 8/23/23 and Meropenem 1gm IV Q8 thru 9/4/23 for sacral wound   PT wound care for VAC noted  VAC placed - wound vac in place. Patient with wound vac in hospital. pt will be transported with a wet to dry dressing to facility where they will place their wound vac.  discharge planning.     Problem/Plan - 2:  ·  Problem: Sinus tachycardia.   ·  Plan: no intervention at this time, continue with toprol 25 mg, once  a day   resolved   echocardiogram result noted.     Problem/Plan - 3:  ·  Problem: Constipation.   ·  Plan: will continue Lactulose and Miralax.     Problem/Plan - 4:  ·  Problem: Diabetes mellitus.   ·  Plan: resume home meds   will monitor finger sticks and cover with insulin sliding scales  HbA1C 6.3.     Problem/Plan - 5:  ·  Problem: HTN (hypertension).   ·  Plan: continue Norvasc 5mg with holding parameters.     Problem/Plan - 6:  ·  Problem: HLD (hyperlipidemia).   ·  Plan: will continue to treat with Atorvastatin.     Problem/Plan - 7:  ·  Problem: Prophylactic measure.   ·  Plan: Lovenox 40mg subcutaneous daily.       Additional Information:  Additional Information: discussed with case management   patient has an accepting facility

## 2023-08-05 NOTE — DISCHARGE NOTE PROVIDER - NSDCMRMEDTOKEN_GEN_ALL_CORE_FT
ascorbic acid 500 mg oral tablet: 1 tab(s) orally once a day  atorvastatin 20 mg oral tablet: 1 tab(s) orally once a day (at bedtime)  gabapentin 100 mg oral tablet: 2 tab(s) orally 3 times a day  GlycoLax oral powder for reconstitution: 17 gram(s) orally once a day as needed for  constipation  lactulose 10 g/15 mL oral syrup: 30 milliliter(s) orally once a day as needed for  constipation  meropenem 1000 mg intravenous injection: 1,000 milligram(s) intravenously every 8 hours  metFORMIN 1000 mg oral tablet: 1 tab(s) orally 2 times a day  Norvasc 5 mg oral tablet: 1 tab(s) orally once a day  vancomycin 750 mg/150 mL intravenous solution: 750 milligram(s) intravenously 2 times a day   acetaminophen 325 mg oral tablet: 2 tab(s) orally every 6 hours As needed Temp greater or equal to 38C (100.4F), Mild Pain (1 - 3)  ascorbic acid 500 mg oral tablet: 1 tab(s) orally once a day  atorvastatin 20 mg oral tablet: 1 tab(s) orally once a day (at bedtime)  enoxaparin: 40 milligram(s) subcutaneous once a day  gabapentin 100 mg oral tablet: 2 tab(s) orally 3 times a day  GlycoLax oral powder for reconstitution: 17 gram(s) orally once a day as needed for  constipation  lactulose 10 g/15 mL oral syrup: 30 milliliter(s) orally once a day as needed for  constipation  meropenem 1000 mg intravenous injection: 1,000 milligram(s) intravenously every 8 hours thru 7/26/2023  metFORMIN 1000 mg oral tablet: 1 tab(s) orally 2 times a day  metoprolol succinate 25 mg oral tablet, extended release: 1 tab(s) orally once a day  Norvasc 5 mg oral tablet: 1 tab(s) orally once a day  vancomycin 1 g intravenous injection: 1 gram(s) intravenous every 12 hours thru 7/26/2023   acetaminophen 325 mg oral tablet: 2 tab(s) orally every 6 hours As needed Temp greater or equal to 38C (100.4F), Mild Pain (1 - 3)  ascorbic acid 500 mg oral tablet: 1 tab(s) orally once a day  atorvastatin 20 mg oral tablet: 1 tab(s) orally once a day (at bedtime)  enoxaparin: 40 milligram(s) subcutaneous once a day  gabapentin 100 mg oral tablet: 2 tab(s) orally 3 times a day  GlycoLax oral powder for reconstitution: 17 gram(s) orally once a day as needed for  constipation  lactulose 10 g/15 mL oral syrup: 30 milliliter(s) orally once a day as needed for  constipation  meropenem 1000 mg intravenous injection: 1,000 milligram(s) intravenously every 8 hours until 9/4/23  metFORMIN 1000 mg oral tablet: 1 tab(s) orally 2 times a day  metoprolol succinate 25 mg oral tablet, extended release: 1 tab(s) orally once a day  Norvasc 5 mg oral tablet: 1 tab(s) orally once a day  vancomycin 1 g intravenous injection: 1 gram(s) intravenous every 12 hours until8/23/23

## 2023-08-05 NOTE — DISCHARGE NOTE PROVIDER - NSDCCPCAREPLAN_GEN_ALL_CORE_FT
PRINCIPAL DISCHARGE DIAGNOSIS  Diagnosis: Sinus tachycardia  Assessment and Plan of Treatment:       SECONDARY DISCHARGE DIAGNOSES  Diagnosis: Sinus tachycardia  Assessment and Plan of Treatment:     Diagnosis: Diabetes mellitus  Assessment and Plan of Treatment:     Diagnosis: HTN (hypertension)  Assessment and Plan of Treatment:     Diagnosis: Sacral decubitus ulcer  Assessment and Plan of Treatment:     Diagnosis: Constipation  Assessment and Plan of Treatment:      PRINCIPAL DISCHARGE DIAGNOSIS  Diagnosis: Sinus tachycardia  Assessment and Plan of Treatment: resolved. cardiac work up cnegative, negative for pulmonary embolus.      SECONDARY DISCHARGE DIAGNOSES  Diagnosis: Sacral decubitus ulcer  Assessment and Plan of Treatment: wound vac in place. Patient with wound vac in hospital. pt will be transported with a wet to dry dressing to facility where they will place their wound vac.    Diagnosis: Constipation  Assessment and Plan of Treatment:     Diagnosis: Diabetes mellitus  Assessment and Plan of Treatment: continue current regimen    Diagnosis: HTN (hypertension)  Assessment and Plan of Treatment: continue current regimen

## 2023-08-06 ENCOUNTER — TRANSCRIPTION ENCOUNTER (OUTPATIENT)
Age: 78
End: 2023-08-06

## 2023-08-06 VITALS
HEART RATE: 91 BPM | DIASTOLIC BLOOD PRESSURE: 74 MMHG | TEMPERATURE: 98 F | SYSTOLIC BLOOD PRESSURE: 121 MMHG | OXYGEN SATURATION: 96 % | RESPIRATION RATE: 18 BRPM

## 2023-08-06 LAB
CULTURE RESULTS: SIGNIFICANT CHANGE UP
CULTURE RESULTS: SIGNIFICANT CHANGE UP
GLUCOSE BLDC GLUCOMTR-MCNC: 124 MG/DL — HIGH (ref 70–99)
GLUCOSE BLDC GLUCOMTR-MCNC: 147 MG/DL — HIGH (ref 70–99)
GLUCOSE BLDC GLUCOMTR-MCNC: 159 MG/DL — HIGH (ref 70–99)
SPECIMEN SOURCE: SIGNIFICANT CHANGE UP
SPECIMEN SOURCE: SIGNIFICANT CHANGE UP

## 2023-08-06 PROCEDURE — 84295 ASSAY OF SERUM SODIUM: CPT

## 2023-08-06 PROCEDURE — 82803 BLOOD GASES ANY COMBINATION: CPT

## 2023-08-06 PROCEDURE — 81001 URINALYSIS AUTO W/SCOPE: CPT

## 2023-08-06 PROCEDURE — 84484 ASSAY OF TROPONIN QUANT: CPT

## 2023-08-06 PROCEDURE — 85730 THROMBOPLASTIN TIME PARTIAL: CPT

## 2023-08-06 PROCEDURE — 82435 ASSAY OF BLOOD CHLORIDE: CPT

## 2023-08-06 PROCEDURE — 82330 ASSAY OF CALCIUM: CPT

## 2023-08-06 PROCEDURE — 83690 ASSAY OF LIPASE: CPT

## 2023-08-06 PROCEDURE — 82962 GLUCOSE BLOOD TEST: CPT

## 2023-08-06 PROCEDURE — 80053 COMPREHEN METABOLIC PANEL: CPT

## 2023-08-06 PROCEDURE — 85014 HEMATOCRIT: CPT

## 2023-08-06 PROCEDURE — 99285 EMERGENCY DEPT VISIT HI MDM: CPT | Mod: 25

## 2023-08-06 PROCEDURE — 85025 COMPLETE CBC W/AUTO DIFF WBC: CPT

## 2023-08-06 PROCEDURE — 85610 PROTHROMBIN TIME: CPT

## 2023-08-06 PROCEDURE — 83605 ASSAY OF LACTIC ACID: CPT

## 2023-08-06 PROCEDURE — 71045 X-RAY EXAM CHEST 1 VIEW: CPT

## 2023-08-06 PROCEDURE — 80202 ASSAY OF VANCOMYCIN: CPT

## 2023-08-06 PROCEDURE — 87086 URINE CULTURE/COLONY COUNT: CPT

## 2023-08-06 PROCEDURE — 80048 BASIC METABOLIC PNL TOTAL CA: CPT

## 2023-08-06 PROCEDURE — 36415 COLL VENOUS BLD VENIPUNCTURE: CPT

## 2023-08-06 PROCEDURE — 87640 STAPH A DNA AMP PROBE: CPT

## 2023-08-06 PROCEDURE — 85027 COMPLETE CBC AUTOMATED: CPT

## 2023-08-06 PROCEDURE — 97162 PT EVAL MOD COMPLEX 30 MIN: CPT

## 2023-08-06 PROCEDURE — 71275 CT ANGIOGRAPHY CHEST: CPT | Mod: MH

## 2023-08-06 PROCEDURE — 84132 ASSAY OF SERUM POTASSIUM: CPT

## 2023-08-06 PROCEDURE — 83036 HEMOGLOBIN GLYCOSYLATED A1C: CPT

## 2023-08-06 PROCEDURE — 83880 ASSAY OF NATRIURETIC PEPTIDE: CPT

## 2023-08-06 PROCEDURE — 84443 ASSAY THYROID STIM HORMONE: CPT

## 2023-08-06 PROCEDURE — 87641 MR-STAPH DNA AMP PROBE: CPT

## 2023-08-06 PROCEDURE — 85018 HEMOGLOBIN: CPT

## 2023-08-06 PROCEDURE — C8929: CPT

## 2023-08-06 PROCEDURE — 82947 ASSAY GLUCOSE BLOOD QUANT: CPT

## 2023-08-06 PROCEDURE — 87040 BLOOD CULTURE FOR BACTERIA: CPT

## 2023-08-06 PROCEDURE — 74177 CT ABD & PELVIS W/CONTRAST: CPT | Mod: MH

## 2023-08-06 PROCEDURE — 84145 PROCALCITONIN (PCT): CPT

## 2023-08-06 PROCEDURE — 83735 ASSAY OF MAGNESIUM: CPT

## 2023-08-06 RX ORDER — METOPROLOL TARTRATE 50 MG
1 TABLET ORAL
Qty: 0 | Refills: 0 | DISCHARGE
Start: 2023-08-06

## 2023-08-06 RX ORDER — ENOXAPARIN SODIUM 100 MG/ML
40 INJECTION SUBCUTANEOUS
Qty: 0 | Refills: 0 | DISCHARGE
Start: 2023-08-06

## 2023-08-06 RX ORDER — ACETAMINOPHEN 500 MG
2 TABLET ORAL
Qty: 0 | Refills: 0 | DISCHARGE
Start: 2023-08-06

## 2023-08-06 RX ORDER — VANCOMYCIN HCL 1 G
1 VIAL (EA) INTRAVENOUS
Qty: 0 | Refills: 0 | DISCHARGE
Start: 2023-08-06

## 2023-08-06 RX ADMIN — Medication 500 MILLIGRAM(S): at 11:31

## 2023-08-06 RX ADMIN — ENOXAPARIN SODIUM 40 MILLIGRAM(S): 100 INJECTION SUBCUTANEOUS at 06:16

## 2023-08-06 RX ADMIN — CHLORHEXIDINE GLUCONATE 1 APPLICATION(S): 213 SOLUTION TOPICAL at 11:29

## 2023-08-06 RX ADMIN — MEROPENEM 100 MILLIGRAM(S): 1 INJECTION INTRAVENOUS at 06:17

## 2023-08-06 RX ADMIN — Medication 25 MILLIGRAM(S): at 06:16

## 2023-08-06 RX ADMIN — POLYETHYLENE GLYCOL 3350 17 GRAM(S): 17 POWDER, FOR SOLUTION ORAL at 11:28

## 2023-08-06 RX ADMIN — Medication 250 MILLIGRAM(S): at 08:05

## 2023-08-06 RX ADMIN — MEROPENEM 100 MILLIGRAM(S): 1 INJECTION INTRAVENOUS at 14:02

## 2023-08-06 RX ADMIN — GABAPENTIN 200 MILLIGRAM(S): 400 CAPSULE ORAL at 06:17

## 2023-08-06 RX ADMIN — GABAPENTIN 200 MILLIGRAM(S): 400 CAPSULE ORAL at 14:01

## 2023-08-06 RX ADMIN — AMLODIPINE BESYLATE 5 MILLIGRAM(S): 2.5 TABLET ORAL at 06:16

## 2023-08-06 NOTE — PROGRESS NOTE ADULT - ASSESSMENT
73 y/o F Hx of DM2, HTN, HLD, prior DVT off AC, obesity, OA, chronic right sacral decubitus ulcer recently started on Abx presented with tachycardia, CTA without central pulmonary embolus admitted for further cardiac work up
Sinus tachycardia   better today   no evidence of acute MI or PE   possible cause due to infection from her sacral decub   on abx  echo limited  cont BB     HTN  stable    DM II  Monitor finger stick. Insulin coverage. Diabetic education and Diabetic diet. Consider nutrition consultation.  
Sinus tachycardia   better today   no evidence of acute MI or PE   possible cause due to infection from her sacral decub   on abx  echo limited  add low dose BB     HTN  stable    DM II  Monitor finger stick. Insulin coverage. Diabetic education and Diabetic diet. Consider nutrition consultation.     Advanced care planning was discussed with patient and family.  Risks, benefits and alternatives of the cardiac treatments and medical therapy including procedures were discussed in detail and all questions were answered. Importance of compliance with medical therapy and lifestyle modification to improve cardiovascular health were addressed. Appropriate forms and patient educational materials were reviewed. 30 minutes face to face spent.  
Sinus tachycardia   stable  cont BB    HTN  stable    DM II  Monitor finger stick. Insulin coverage.   
A/P:  75yo Female wheelchair bound with history of HTN, HLD, T2DM, DVT ,obesity, osteoarthritis, PAD, chronic sacral wound. Recently started on Meropenem (7/24 for 6 weeks) and Vanco (7/26 for 4 weeks) via right PICC line.      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      Buttocks wound- improving- VAC tx w/ medihoney as per protocol w/ Wound PT  -   Buttocks/CAVILON ADVANCE TIW and prn soiling   -   Continue turning and positioning w/ offloading assistive devices as per protocol  -   Continue w/ attends under pads and Pericare as per protocol  -   Waffle Cushion to chair when oob to chair  -   Continue w/ low air loss pressure redistribution bed surface   Moisturize intact skin w/ SWEEN cream BID  Abx per Medicine/ ID  Nutrition Consult for optimization in pt w/ Increased nutritional needs     Encourage high quality protein, fely/ prosource, MVI & Vit C to promote wound healing  Pt will need Group 2 mattress on hospital bed and ROHO cushion for wheel chair upon discharge home  Care as per medicine, will follow w/ you   Upon discharge f/u as outpatient at [facility] [address] [facility] [phone]  D/w team & RN & attng  Johana Vasquez PA-C, [facility] [phone]  Nights/ Weekends/ Holidays please call:  General Surgery Consult pager ([phone]) for emergencies  Wound PT for multilayer leg wrapping or VAC issues (x [phone])   I spent  35minutes face to face w/ this pt of which more than 50% of the time was spent counseling & coordinating care of this pt. 
73 y/o F Hx of DM2, HTN, HLD, prior DVT off AC, obesity, OA, chronic right sacral decubitus ulcer recently started on Abx presented with tachycardia, CTA without central pulmonary embolus admitted for further cardiac work up
75 y/o F Hx of DM2, HTN, HLD, prior DVT off AC, obesity, OA, chronic right sacral decubitus ulcer recently started on Abx presented with tachycardia, CTA without central pulmonary embolus admitted for further cardiac work up
73 y/o F Hx of DM2, HTN, HLD, prior DVT off AC, obesity, OA, chronic right sacral decubitus ulcer recently started on Abx presented with tachycardia, CTA without central pulmonary embolus admitted for further cardiac work up
75 y/o F Hx of DM2, HTN, HLD, prior DVT off AC, obesity, OA, chronic right sacral decubitus ulcer recently started on Abx presented with tachycardia, CTA without central pulmonary embolus admitted for further cardiac work up
75 y/o F Hx of DM2, HTN, HLD, prior DVT off AC, obesity, OA, chronic right sacral decubitus ulcer recently started on Abx presented with tachycardia, CTA without central pulmonary embolus admitted for further cardiac work up

## 2023-08-06 NOTE — PROGRESS NOTE ADULT - PROBLEM SELECTOR PROBLEM 2
Sacral decubitus ulcer
Sacral decubitus ulcer
Sinus tachycardia

## 2023-08-06 NOTE — PROGRESS NOTE ADULT - PROVIDER SPECIALTY LIST ADULT
Wound Care
Cardiology
Internal Medicine

## 2023-08-06 NOTE — PROGRESS NOTE ADULT - PROBLEM SELECTOR PLAN 5
continue Norvasc 5mg with holding parameters

## 2023-08-06 NOTE — PROGRESS NOTE ADULT - PROBLEM SELECTOR PROBLEM 1
Sacral decubitus ulcer
Sinus tachycardia
Sacral decubitus ulcer
Sinus tachycardia

## 2023-08-06 NOTE — PROGRESS NOTE ADULT - REASON FOR ADMISSION
tachycardia

## 2023-08-06 NOTE — DISCHARGE NOTE NURSING/CASE MANAGEMENT/SOCIAL WORK - NSDCPEFALRISK_GEN_ALL_CORE
For information on Fall & Injury Prevention, visit: [email or web address] OR  [email or web address] OR  https://www.cdc.gov/steadi/patient.html

## 2023-08-06 NOTE — PROGRESS NOTE ADULT - PROBLEM/PLAN-5
DISPLAY PLAN FREE TEXT
DISPLAY PLAN FREE TEXT
.
DISPLAY PLAN FREE TEXT

## 2023-08-06 NOTE — PROGRESS NOTE ADULT - PROBLEM SELECTOR PLAN 2
no intervention at this time  resolved   echocardiogram result noted
- will continue with Vanco 750mg IV Q12 (7/26) and Meropenem 1gm IV Q8 (7/24) for sacral wound infection  - Wound care evaluation pending
no intervention at this time  resolved   echocardiogram result noted
continue with Vanco 750mg IV Q12 (7/26) and Meropenem 1gm IV Q8 (7/24) for sacral wound infection  Wound care evaluation appreciated  continue to follow recommendations  PICC cleared for use
no intervention at this time  resolved   echocardiogram result noted
no intervention at this time  resolved   echocardiogram result noted

## 2023-08-06 NOTE — PROGRESS NOTE ADULT - PROBLEM SELECTOR PLAN 6
- will continue to treat with Atorvastatin
will continue to treat with Atorvastatin
- will continue to treat with Atorvastatin
- will continue to treat with Atorvastatin

## 2023-08-06 NOTE — PROGRESS NOTE ADULT - PROBLEM SELECTOR PLAN 3
will continue Lactulose and Miralax
- will continue to treat with Lactulose and Miralax

## 2023-08-06 NOTE — PROGRESS NOTE ADULT - PROBLEM SELECTOR PLAN 1
continue with Vanco 750mg IV Q12 (7/26) and Meropenem 1gm IV Q8 (7/24) for sacral wound infection  Wound care evaluation appreciated  PT wound care for VAC
discussed with cardiology  work up so far negative   will continue to monitor   echocardiogram result noted  discussed with cardiology  no intervention at this time
discussed with cardiology  work up so far negative   will continue to monitor   echocardiogram result pending
continue with Vanco increased to 1000mg IV Q12 (7/26) and Meropenem 1gm IV Q8 (7/24) for sacral wound infection  Wound care evaluation appreciated  PT wound care for VAC noted  VAC placed
continue with Vanco increased to 1000mg IV Q12 (7/26) and Meropenem 1gm IV Q8 (7/24) for sacral wound   PT wound care for VAC noted  VAC placed  discharge planning
continue with Vanco increased to 1000mg IV Q12 (7/26) and Meropenem 1gm IV Q8 (7/24) for sacral wound infection  PT wound care for VAC noted  VAC placed

## 2023-08-06 NOTE — PROGRESS NOTE ADULT - PROBLEM SELECTOR PLAN 4
resume home meds   will monitor finger sticks and cover with insulin sliding scales  HbA1C 6.3
no oral meds   - will monitor finger sticks and cover with insulin sliding scales  HbA1C 6.3  no oral meds
- will hold Meformin  - will monitor finger sticks and cover with insulin sliding scales  HbA1C 6.3  no oral meds
no oral meds   - will monitor finger sticks and cover with insulin sliding scales  HbA1C 6.3  no oral meds

## 2023-08-06 NOTE — DISCHARGE NOTE NURSING/CASE MANAGEMENT/SOCIAL WORK - PATIENT PORTAL LINK FT
You can access the FollowMyHealth Patient Portal offered by [facility] by registering at the following website: [email or web address]. By joining [employer]’s FollowMyHealth portal, you will also be able to view your health information using other applications (apps) compatible with our system.

## 2023-08-06 NOTE — PROGRESS NOTE ADULT - SUBJECTIVE AND OBJECTIVE BOX
Subjective: Patient seen and examined. No new events except as noted.     SUBJECTIVE/ROS:  nad      MEDICATIONS:  MEDICATIONS  (STANDING):  amLODIPine   Tablet 5 milliGRAM(s) Oral daily  ascorbic acid 500 milliGRAM(s) Oral daily  atorvastatin 20 milliGRAM(s) Oral at bedtime  Dakins Solution - 1/4 Strength 1 Application(s) Topical three times a day  dextrose 5%. 1000 milliLiter(s) (100 mL/Hr) IV Continuous <Continuous>  dextrose 5%. 1000 milliLiter(s) (50 mL/Hr) IV Continuous <Continuous>  dextrose 50% Injectable 25 Gram(s) IV Push once  dextrose 50% Injectable 12.5 Gram(s) IV Push once  dextrose 50% Injectable 25 Gram(s) IV Push once  enoxaparin Injectable 40 milliGRAM(s) SubCutaneous every 24 hours  gabapentin 200 milliGRAM(s) Oral three times a day  glucagon  Injectable 1 milliGRAM(s) IntraMuscular once  insulin lispro (ADMELOG) corrective regimen sliding scale   SubCutaneous three times a day before meals  insulin lispro (ADMELOG) corrective regimen sliding scale   SubCutaneous at bedtime  meropenem  IVPB      meropenem  IVPB 1000 milliGRAM(s) IV Intermittent every 8 hours  metoprolol succinate ER 25 milliGRAM(s) Oral daily  polyethylene glycol 3350 17 Gram(s) Oral daily  vancomycin  IVPB      vancomycin  IVPB 750 milliGRAM(s) IV Intermittent every 12 hours      PHYSICAL EXAM:  T(C): 36.9 (08-01-23 @ 20:34), Max: 37.3 (08-01-23 @ 17:41)  HR: 91 (08-01-23 @ 20:34) (88 - 98)  BP: 131/71 (08-01-23 @ 20:34) (129/76 - 138/79)  RR: 18 (08-01-23 @ 20:34) (18 - 19)  SpO2: 100% (08-01-23 @ 20:34) (97% - 100%)  Wt(kg): --  I&O's Summary    01 Aug 2023 07:01  -  02 Aug 2023 07:00  --------------------------------------------------------  IN: 120 mL / OUT: 1650 mL / NET: -1530 mL            JVP: Normal  Neck: supple  Lung: clear   CV: S1 S2 , Murmur:  Abd: soft  Ext: No edema  neuro: Awake / alert  Psych: flat affect  Skin: normal``    LABS/DATA:    CARDIAC MARKERS:                                12.1   5.12  )-----------( 276      ( 01 Aug 2023 07:35 )             36.9     08-01    139  |  102  |  <4<L>  ----------------------------<  95  3.7   |  24  |  0.33<L>    Ca    9.5      01 Aug 2023 07:35  Mg     2.0     07-31    TPro  7.6  /  Alb  3.3  /  TBili  0.3  /  DBili  x   /  AST  28  /  ALT  46<H>  /  AlkPhos  91  07-31    proBNP:   Lipid Profile:   HgA1c:   TSH:     TELE:  EKG:        
    Subjective: Patient seen and examined. No new events except as noted.     SUBJECTIVE/ROS:  nad      MEDICATIONS:  MEDICATIONS  (STANDING):  amLODIPine   Tablet 5 milliGRAM(s) Oral daily  ascorbic acid 500 milliGRAM(s) Oral daily  atorvastatin 20 milliGRAM(s) Oral at bedtime  chlorhexidine 2% Cloths 1 Application(s) Topical daily  dextrose 5%. 1000 milliLiter(s) (100 mL/Hr) IV Continuous <Continuous>  dextrose 5%. 1000 milliLiter(s) (50 mL/Hr) IV Continuous <Continuous>  dextrose 50% Injectable 25 Gram(s) IV Push once  dextrose 50% Injectable 12.5 Gram(s) IV Push once  dextrose 50% Injectable 25 Gram(s) IV Push once  enoxaparin Injectable 40 milliGRAM(s) SubCutaneous every 24 hours  gabapentin 200 milliGRAM(s) Oral three times a day  glucagon  Injectable 1 milliGRAM(s) IntraMuscular once  insulin lispro (ADMELOG) corrective regimen sliding scale   SubCutaneous three times a day before meals  insulin lispro (ADMELOG) corrective regimen sliding scale   SubCutaneous at bedtime  meropenem  IVPB      meropenem  IVPB 1000 milliGRAM(s) IV Intermittent every 8 hours  metoprolol succinate ER 25 milliGRAM(s) Oral daily  polyethylene glycol 3350 17 Gram(s) Oral daily  vancomycin  IVPB 1000 milliGRAM(s) IV Intermittent every 12 hours      PHYSICAL EXAM:  T(C): 36.8 (08-05-23 @ 04:46), Max: 37.2 (08-04-23 @ 21:17)  HR: 83 (08-05-23 @ 04:46) (83 - 97)  BP: 100/65 (08-05-23 @ 04:46) (100/65 - 111/66)  RR: 18 (08-05-23 @ 04:46) (18 - 18)  SpO2: 96% (08-05-23 @ 04:46) (95% - 96%)  Wt(kg): --  I&O's Summary    04 Aug 2023 07:01  -  05 Aug 2023 07:00  --------------------------------------------------------  IN: 760 mL / OUT: 3000 mL / NET: -2240 mL            JVP: Normal  Neck: supple  Lung: clear   CV: S1 S2 , Murmur:  Abd: soft  Ext: No edema  neuro: Awake / alert  Psych: flat affect  Skin: normal``    LABS/DATA:    CARDIAC MARKERS:                                11.6   4.22  )-----------( 258      ( 04 Aug 2023 07:13 )             35.4     08-04    141  |  104  |  9   ----------------------------<  92  3.9   |  26  |  0.48<L>    Ca    9.5      04 Aug 2023 07:13      proBNP:   Lipid Profile:   HgA1c:   TSH:     TELE:  EKG:        
    Subjective: Patient seen and examined. No new events except as noted.     SUBJECTIVE/ROS:    nad    MEDICATIONS:  MEDICATIONS  (STANDING):  amLODIPine   Tablet 5 milliGRAM(s) Oral daily  ascorbic acid 500 milliGRAM(s) Oral daily  atorvastatin 20 milliGRAM(s) Oral at bedtime  chlorhexidine 2% Cloths 1 Application(s) Topical daily  dextrose 5%. 1000 milliLiter(s) (100 mL/Hr) IV Continuous <Continuous>  dextrose 5%. 1000 milliLiter(s) (50 mL/Hr) IV Continuous <Continuous>  dextrose 50% Injectable 25 Gram(s) IV Push once  dextrose 50% Injectable 12.5 Gram(s) IV Push once  dextrose 50% Injectable 25 Gram(s) IV Push once  enoxaparin Injectable 40 milliGRAM(s) SubCutaneous every 24 hours  gabapentin 200 milliGRAM(s) Oral three times a day  glucagon  Injectable 1 milliGRAM(s) IntraMuscular once  insulin lispro (ADMELOG) corrective regimen sliding scale   SubCutaneous three times a day before meals  insulin lispro (ADMELOG) corrective regimen sliding scale   SubCutaneous at bedtime  meropenem  IVPB      meropenem  IVPB 1000 milliGRAM(s) IV Intermittent every 8 hours  metoprolol succinate ER 25 milliGRAM(s) Oral daily  polyethylene glycol 3350 17 Gram(s) Oral daily  vancomycin  IVPB 1000 milliGRAM(s) IV Intermittent every 12 hours      PHYSICAL EXAM:  T(C): 36.4 (08-06-23 @ 04:36), Max: 36.8 (08-05-23 @ 21:43)  HR: 83 (08-06-23 @ 04:36) (81 - 83)  BP: 104/60 (08-06-23 @ 04:36) (104/60 - 117/73)  RR: 18 (08-06-23 @ 04:36) (18 - 18)  SpO2: 97% (08-06-23 @ 04:36) (96% - 97%)  Wt(kg): --  I&O's Summary    05 Aug 2023 07:01  -  06 Aug 2023 07:00  --------------------------------------------------------  IN: 420 mL / OUT: 1600 mL / NET: -1180 mL            JVP: Normal  Neck: supple  Lung: clear   CV: S1 S2 , Murmur:  Abd: soft  Ext: No edema  neuro: Awake / alert  Psych: flat affect  Skin: normal``    LABS/DATA:    CARDIAC MARKERS:                  proBNP:   Lipid Profile:   HgA1c:   TSH:     TELE:  EKG:        
[facility]-- WOUND TEAM -- FOLLOW UP NOTE  --------------------------------------------------------------------------------    24 hour events/subjective:    afebrile  tolerating po w/o n/v  incontinent  tolerating dressing     no longer w/ odor, excess drainage     ROS: General/ SKIN/ GI see HPI  all other systems negative      ALLERGIES & MEDICATIONS  --------------------------------------------------------------------------------    No Known Allergies      STANDING INPATIENT MEDICATIONS  amLODIPine Tablet 5 milliGRAM(s) Oral daily  ascorbic acid 500 milliGRAM(s) Oral daily  atorvastatin 20 milliGRAM(s) Oral at bedtime  chlorhexidine 2% Cloths 1 Application(s) Topical daily  dextrose 5%. 1000 milliLiter(s) IV Continuous <Continuous>  dextrose 5%. 1000 milliLiter(s) IV Continuous <Continuous>  dextrose 50% Injectable 25 Gram(s) IV Push once  dextrose 50% Injectable 12.5 Gram(s) IV Push once  dextrose 50% Injectable 25 Gram(s) IV Push once  enoxaparin Injectable 40 milliGRAM(s) SubCutaneous every 24 hours  gabapentin 200 milliGRAM(s) Oral three times a day  glucagon  Injectable 1 milliGRAM(s) IntraMuscular once  insulin lispro (ADMELOG) corrective regimen sliding scale   SubCutaneous three times a day before meals  insulin lispro (ADMELOG) corrective regimen sliding scale   SubCutaneous at bedtime     meropenem  IVPB 1000 milliGRAM(s) IV Intermittent every 8 hours  metoprolol succinate ER 25 milliGRAM(s) Oral daily  polyethylene glycol 3350 17 Gram(s) Oral daily  vancomycin  IVPB 1000 milliGRAM(s) IV Intermittent every 12 hours      PRN INPATIENT MEDICATION  acetaminophen Tablet 650 milliGRAM(s) Oral every 6 hours PRN  dextrose Oral Gel 15 Gram(s) Oral once PRN  lactulose Syrup 20 Gram(s) Oral daily PRN        VITALS/PHYSICAL EXAM  --------------------------------------------------------------------------------  T(C): 37.4 (03 Aug 2023 13:33), Max: 37.4 (03 Aug 2023 13:33)  T(F): 99.3 (03 Aug 2023 13:33), Max: 99.3 (03 Aug 2023 13:33)  HR: 81 (03 Aug 2023 13:33) (81 - 103)  BP: 136/92 (03 Aug 2023 13:33) (111/65 - 136/92)  BP(mean): --  RR: 18 (03 Aug 2023 13:33) (18 - 18)  SpO2: 96% (03 Aug 2023 13:33) (96% - 97%)      NAD,  A&Ox3/  Obese  HEENT:  NC/ AT. 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  Pt tolerated procedure well.  Hemostasis was maintained throughout.    Debulking debridement of loose necrotic tissue w/ inceased granular tissue. bone palpable but not exposed   [id number], circumferential undermining deepest at 2:00 4.5cm.   (+)  serosanguinous drainage,   No warmth, odor, erythema, tenderness, induration, fluctuance, nor crepitus      LABS/ CULTURES/ RADIOLOGY:              11.7   4.06  >-----------<  263      [08-03-23 @ 07:20]              35.7     147  |  106  |  7   ----------------------------<  104      [08-03-23 @ 07:18]  3.4   |  21  |  0.44        Ca     9.9     [08-03-23 @ 07:18]      Mg     2.4     [08-03-23 @ 07:18]    A1C with Estimated Average Glucose Result: 6.3 % (08-01-23 @ 07:35)  A1C with Estimated Average Glucose Result: 6.6 % (07-31-23 @ 16:45)        CAPILLARY BLOOD GLUCOSE  POCT Blood Glucose.: 167 mg/dL (03 Aug 2023 12:24)  POCT Blood Glucose.: 109 mg/dL (03 Aug 2023 08:21)      Culture - Blood (collected 07-31-23 @ 21:50)  Source: .Blood Blood  Preliminary Report (08-03-23 @ 03:01):    No growth at 48 Hours    Culture - Blood (collected 07-31-23 @ 21:45)  Source: .Blood Blood  Preliminary Report (08-03-23 @ 03:01):    No growth at 48 Hours        
    Subjective: Patient seen and examined. No new events except as noted.     SUBJECTIVE/ROS:  nad      MEDICATIONS:  MEDICATIONS  (STANDING):  amLODIPine   Tablet 5 milliGRAM(s) Oral daily  ascorbic acid 500 milliGRAM(s) Oral daily  atorvastatin 20 milliGRAM(s) Oral at bedtime  chlorhexidine 2% Cloths 1 Application(s) Topical daily  Dakins Solution - 1/4 Strength 1 Application(s) Topical three times a day  dextrose 5%. 1000 milliLiter(s) (100 mL/Hr) IV Continuous <Continuous>  dextrose 5%. 1000 milliLiter(s) (50 mL/Hr) IV Continuous <Continuous>  dextrose 50% Injectable 25 Gram(s) IV Push once  dextrose 50% Injectable 12.5 Gram(s) IV Push once  dextrose 50% Injectable 25 Gram(s) IV Push once  enoxaparin Injectable 40 milliGRAM(s) SubCutaneous every 24 hours  gabapentin 200 milliGRAM(s) Oral three times a day  glucagon  Injectable 1 milliGRAM(s) IntraMuscular once  insulin lispro (ADMELOG) corrective regimen sliding scale   SubCutaneous three times a day before meals  insulin lispro (ADMELOG) corrective regimen sliding scale   SubCutaneous at bedtime  meropenem  IVPB      meropenem  IVPB 1000 milliGRAM(s) IV Intermittent every 8 hours  metoprolol succinate ER 25 milliGRAM(s) Oral daily  polyethylene glycol 3350 17 Gram(s) Oral daily  vancomycin  IVPB 1000 milliGRAM(s) IV Intermittent every 12 hours      PHYSICAL EXAM:  T(C): 36.9 (08-03-23 @ 04:32), Max: 37.1 (08-02-23 @ 21:24)  HR: 82 (08-03-23 @ 04:32) (82 - 103)  BP: 119/71 (08-03-23 @ 04:32) (111/65 - 123/81)  RR: 18 (08-03-23 @ 04:32) (18 - 18)  SpO2: 97% (08-03-23 @ 04:32) (96% - 98%)  Wt(kg): --  I&O's Summary    02 Aug 2023 07:01  -  03 Aug 2023 07:00  --------------------------------------------------------  IN: 360 mL / OUT: 1400 mL / NET: -1040 mL            JVP: Normal  Neck: supple  Lung: clear   CV: S1 S2 , Murmur:  Abd: soft  Ext: No edema  neuro: Awake / alert  Psych: flat affect  Skin: normal``    LABS/DATA:    CARDIAC MARKERS:                                11.7   4.06  )-----------( 263      ( 03 Aug 2023 07:20 )             35.7           proBNP:   Lipid Profile:   HgA1c:   TSH:     TELE:  EKG:        
    Subjective: Patient seen and examined. No new events except as noted.     SUBJECTIVE/ROS:  nad      MEDICATIONS:  MEDICATIONS  (STANDING):  amLODIPine   Tablet 5 milliGRAM(s) Oral daily  ascorbic acid 500 milliGRAM(s) Oral daily  atorvastatin 20 milliGRAM(s) Oral at bedtime  chlorhexidine 2% Cloths 1 Application(s) Topical daily  dextrose 5%. 1000 milliLiter(s) (100 mL/Hr) IV Continuous <Continuous>  dextrose 5%. 1000 milliLiter(s) (50 mL/Hr) IV Continuous <Continuous>  dextrose 50% Injectable 25 Gram(s) IV Push once  dextrose 50% Injectable 12.5 Gram(s) IV Push once  dextrose 50% Injectable 25 Gram(s) IV Push once  enoxaparin Injectable 40 milliGRAM(s) SubCutaneous every 24 hours  gabapentin 200 milliGRAM(s) Oral three times a day  glucagon  Injectable 1 milliGRAM(s) IntraMuscular once  insulin lispro (ADMELOG) corrective regimen sliding scale   SubCutaneous three times a day before meals  insulin lispro (ADMELOG) corrective regimen sliding scale   SubCutaneous at bedtime  meropenem  IVPB      meropenem  IVPB 1000 milliGRAM(s) IV Intermittent every 8 hours  metoprolol succinate ER 25 milliGRAM(s) Oral daily  polyethylene glycol 3350 17 Gram(s) Oral daily  vancomycin  IVPB 1000 milliGRAM(s) IV Intermittent every 12 hours      PHYSICAL EXAM:  T(C): 36.9 (08-04-23 @ 04:37), Max: 37.4 (08-03-23 @ 13:33)  HR: 87 (08-04-23 @ 04:37) (81 - 96)  BP: 127/77 (08-04-23 @ 04:37) (111/70 - 136/92)  RR: 18 (08-04-23 @ 04:37) (18 - 18)  SpO2: 96% (08-04-23 @ 04:37) (95% - 96%)  Wt(kg): --  I&O's Summary    03 Aug 2023 07:01  -  04 Aug 2023 07:00  --------------------------------------------------------  IN: 500 mL / OUT: 0 mL / NET: 500 mL            JVP: Normal  Neck: supple  Lung: clear   CV: S1 S2 , Murmur:  Abd: soft  Ext: No edema  neuro: Awake / alert  Psych: flat affect  Skin: normal``    LABS/DATA:    CARDIAC MARKERS:                                11.6   4.22  )-----------( 258      ( 04 Aug 2023 07:13 )             35.4     08-04    141  |  104  |  9   ----------------------------<  92  3.9   |  26  |  0.48<L>    Ca    9.5      04 Aug 2023 07:13  Mg     2.4     08-03      proBNP:   Lipid Profile:   HgA1c:   TSH:     TELE:  EKG:        
Patient is a 77y old  Female who presents with a chief complaint of tachycardia (01 Aug 2023 08:42)      DATE OF SERVICE: 08-01-23 @ 14:04    SUBJECTIVE / OVERNIGHT EVENTS: overnight events noted    ROS:  Resp: No cough no sputum production  CVS: No chest pain no palpitations no orthopnea  GI: no N/V/D  "I feel fine'       MEDICATIONS  (STANDING):  amLODIPine   Tablet 5 milliGRAM(s) Oral daily  ascorbic acid 500 milliGRAM(s) Oral daily  atorvastatin 20 milliGRAM(s) Oral at bedtime  dextrose 5%. 1000 milliLiter(s) (100 mL/Hr) IV Continuous <Continuous>  dextrose 5%. 1000 milliLiter(s) (50 mL/Hr) IV Continuous <Continuous>  dextrose 50% Injectable 25 Gram(s) IV Push once  dextrose 50% Injectable 25 Gram(s) IV Push once  dextrose 50% Injectable 12.5 Gram(s) IV Push once  enoxaparin Injectable 40 milliGRAM(s) SubCutaneous every 24 hours  gabapentin 200 milliGRAM(s) Oral three times a day  glucagon  Injectable 1 milliGRAM(s) IntraMuscular once  insulin lispro (ADMELOG) corrective regimen sliding scale   SubCutaneous three times a day before meals  insulin lispro (ADMELOG) corrective regimen sliding scale   SubCutaneous at bedtime  meropenem  IVPB      meropenem  IVPB 1000 milliGRAM(s) IV Intermittent every 8 hours  polyethylene glycol 3350 17 Gram(s) Oral daily  vancomycin  IVPB      vancomycin  IVPB 750 milliGRAM(s) IV Intermittent every 12 hours    MEDICATIONS  (PRN):  acetaminophen     Tablet .. 650 milliGRAM(s) Oral every 6 hours PRN Temp greater or equal to 38C (100.4F), Mild Pain (1 - 3)  dextrose Oral Gel 15 Gram(s) Oral once PRN Blood Glucose LESS THAN 70 milliGRAM(s)/deciliter  lactulose Syrup 20 Gram(s) Oral daily PRN for constipation        CAPILLARY BLOOD GLUCOSE      POCT Blood Glucose.: 107 mg/dL (01 Aug 2023 11:46)  POCT Blood Glucose.: 126 mg/dL (01 Aug 2023 08:20)  POCT Blood Glucose.: 113 mg/dL (01 Aug 2023 00:10)    I&O's Summary      Vital Signs Last 24 Hrs  T(C): 36.7 (01 Aug 2023 13:33), Max: 37.2 (01 Aug 2023 00:12)  T(F): 98 (01 Aug 2023 13:33), Max: 98.9 (01 Aug 2023 00:12)  HR: 88 (01 Aug 2023 13:33) (84 - 120)  BP: 129/76 (01 Aug 2023 13:33) (122/76 - 148/81)  BP(mean): 72 (31 Jul 2023 21:50) (72 - 72)  RR: 19 (01 Aug 2023 13:33) (16 - 19)  SpO2: 98% (01 Aug 2023 13:33) (95% - 100%)    PHYSICAL EXAM:  EYES: EOMI, PERRLA  NECK: Supple, No JVD  CHEST/LUNG: clear  HEART: S1 S2; no murmurs   ABDOMEN: Soft, Nontender  EXTREMITIES:   no edema  NEUROLOGY: AO x 3   SKIN: decubitus ulcer right buttock    LABS:                        12.1   5.12  )-----------( 276      ( 01 Aug 2023 07:35 )             36.9     08-01    139  |  102  |  <4<L>  ----------------------------<  95  3.7   |  24  |  0.33<L>    Ca    9.5      01 Aug 2023 07:35  Mg     2.0     07-31    TPro  7.6  /  Alb  3.3  /  TBili  0.3  /  DBili  x   /  AST  28  /  ALT  46<H>  /  AlkPhos  91  07-31    PT/INR - ( 31 Jul 2023 16:45 )   PT: 13.3 sec;   INR: 1.22 ratio         PTT - ( 31 Jul 2023 16:45 )  PTT:32.6 sec      Urinalysis Basic - ( 01 Aug 2023 07:35 )    Color: x / Appearance: x / SG: x / pH: x  Gluc: 95 mg/dL / Ketone: x  / Bili: x / Urobili: x   Blood: x / Protein: x / Nitrite: x   Leuk Esterase: x / RBC: x / WBC x   Sq Epi: x / Non Sq Epi: x / Bacteria: x          All consultant(s) notes reviewed and care discussed with other providers        Contact Number, Dr Siddiqui [phone]
Patient is a 77y old  Female who presents with a chief complaint of tachycardia (04 Aug 2023 08:21)      DATE OF SERVICE: 08-04-23 @ 16:07    SUBJECTIVE / OVERNIGHT EVENTS: overnight events noted    ROS:  Resp: No cough no sputum production  CVS: No chest pain no palpitations no orthopnea  GI: no N/V/D  : no dysuria, no hematuria  Neuro: no weakness no paresthesias          MEDICATIONS  (STANDING):  amLODIPine   Tablet 5 milliGRAM(s) Oral daily  ascorbic acid 500 milliGRAM(s) Oral daily  atorvastatin 20 milliGRAM(s) Oral at bedtime  chlorhexidine 2% Cloths 1 Application(s) Topical daily  dextrose 5%. 1000 milliLiter(s) (100 mL/Hr) IV Continuous <Continuous>  dextrose 5%. 1000 milliLiter(s) (50 mL/Hr) IV Continuous <Continuous>  dextrose 50% Injectable 25 Gram(s) IV Push once  dextrose 50% Injectable 12.5 Gram(s) IV Push once  dextrose 50% Injectable 25 Gram(s) IV Push once  enoxaparin Injectable 40 milliGRAM(s) SubCutaneous every 24 hours  gabapentin 200 milliGRAM(s) Oral three times a day  glucagon  Injectable 1 milliGRAM(s) IntraMuscular once  insulin lispro (ADMELOG) corrective regimen sliding scale   SubCutaneous three times a day before meals  insulin lispro (ADMELOG) corrective regimen sliding scale   SubCutaneous at bedtime  meropenem  IVPB      meropenem  IVPB 1000 milliGRAM(s) IV Intermittent every 8 hours  metoprolol succinate ER 25 milliGRAM(s) Oral daily  polyethylene glycol 3350 17 Gram(s) Oral daily  vancomycin  IVPB 1000 milliGRAM(s) IV Intermittent every 12 hours    MEDICATIONS  (PRN):  acetaminophen     Tablet .. 650 milliGRAM(s) Oral every 6 hours PRN Temp greater or equal to 38C (100.4F), Mild Pain (1 - 3)  dextrose Oral Gel 15 Gram(s) Oral once PRN Blood Glucose LESS THAN 70 milliGRAM(s)/deciliter  lactulose Syrup 20 Gram(s) Oral daily PRN for constipation        CAPILLARY BLOOD GLUCOSE      POCT Blood Glucose.: 208 mg/dL (04 Aug 2023 12:14)  POCT Blood Glucose.: 107 mg/dL (04 Aug 2023 08:19)  POCT Blood Glucose.: 168 mg/dL (03 Aug 2023 21:54)  POCT Blood Glucose.: 123 mg/dL (03 Aug 2023 17:00)    I&O's Summary    03 Aug 2023 07:01  -  04 Aug 2023 07:00  --------------------------------------------------------  IN: 500 mL / OUT: 0 mL / NET: 500 mL    04 Aug 2023 07:01  -  04 Aug 2023 16:07  --------------------------------------------------------  IN: 0 mL / OUT: 600 mL / NET: -600 mL        Vital Signs Last 24 Hrs  T(C): 36.8 (04 Aug 2023 13:44), Max: 37.2 (03 Aug 2023 21:42)  T(F): 98.3 (04 Aug 2023 13:44), Max: 98.9 (03 Aug 2023 21:42)  HR: 87 (04 Aug 2023 13:44) (87 - 96)  BP: 111/66 (04 Aug 2023 13:44) (111/66 - 127/77)  BP(mean): --  RR: 18 (04 Aug 2023 13:44) (18 - 18)  SpO2: 95% (04 Aug 2023 13:44) (95% - 96%)      PHYSICAL EXAM:  EYES: EOMI, PERRLA  NECK: Supple, No JVD  CHEST/LUNG: clear  HEART: S1 S2; no murmurs   ABDOMEN: Soft, Nontender  EXTREMITIES:   no edema  NEUROLOGY: AO x 3   SKIN: sacral decub     LABS:                        11.6   4.22  )-----------( 258      ( 04 Aug 2023 07:13 )             35.4     08-04    141  |  104  |  9   ----------------------------<  92  3.9   |  26  |  0.48<L>    Ca    9.5      04 Aug 2023 07:13  Mg     2.4     08-03            Urinalysis Basic - ( 04 Aug 2023 07:13 )    Color: x / Appearance: x / SG: x / pH: x  Gluc: 92 mg/dL / Ketone: x  / Bili: x / Urobili: x   Blood: x / Protein: x / Nitrite: x   Leuk Esterase: x / RBC: x / WBC x   Sq Epi: x / Non Sq Epi: x / Bacteria: x          All consultant(s) notes reviewed and care discussed with other providers        Contact Number, Dr Siddiqui [phone]
Patient is a 77y old  Female who presents with a chief complaint of Abdominal pain    Chart reviewed, events noted. This is a "74 [place] Female wheelchair bound with history of HTN, HLD, T2DM, DVT 2 year ago off anticoagulation, obesity, osteoarthritis, PAD, chronic sacral wound (2 years and 4 months per patient), recently started on Meropenem (7/24 for 6 weeks) and Vanco (7/26 for 4 weeks) via right PICC line, was sent in form outpatient cardiologist, Dr. Toney's office for the evaluation of tachycardia.  Patient reported to be tachycardic and "feels sick but cannot elaborate further" , then was subsequently found sinus tachycardia on EKG with history of DVT off anticoagulation, she was sent in to ED to evaluate for a pulmonary embolism.  Currently, patient is hungry, but no other symptoms.  She endorses headache and stomach problems/constipation intermittently, last bowel movement 4 days ago." (03 Aug 2023 14:07)      DATE OF SERVICE: 08-03-23 @ 16:00    SUBJECTIVE / OVERNIGHT EVENTS: overnight events noted    ROS:  Resp: No cough no sputum production  CVS: No chest pain no palpitations no orthopnea  GI: no N/V/D        MEDICATIONS  (STANDING):  amLODIPine   Tablet 5 milliGRAM(s) Oral daily  ascorbic acid 500 milliGRAM(s) Oral daily  atorvastatin 20 milliGRAM(s) Oral at bedtime  chlorhexidine 2% Cloths 1 Application(s) Topical daily  Dakins Solution - 1/4 Strength 1 Application(s) Topical three times a day  dextrose 5%. 1000 milliLiter(s) (100 mL/Hr) IV Continuous <Continuous>  dextrose 5%. 1000 milliLiter(s) (50 mL/Hr) IV Continuous <Continuous>  dextrose 50% Injectable 25 Gram(s) IV Push once  dextrose 50% Injectable 12.5 Gram(s) IV Push once  dextrose 50% Injectable 25 Gram(s) IV Push once  enoxaparin Injectable 40 milliGRAM(s) SubCutaneous every 24 hours  gabapentin 200 milliGRAM(s) Oral three times a day  glucagon  Injectable 1 milliGRAM(s) IntraMuscular once  insulin lispro (ADMELOG) corrective regimen sliding scale   SubCutaneous three times a day before meals  insulin lispro (ADMELOG) corrective regimen sliding scale   SubCutaneous at bedtime  meropenem  IVPB      meropenem  IVPB 1000 milliGRAM(s) IV Intermittent every 8 hours  metoprolol succinate ER 25 milliGRAM(s) Oral daily  polyethylene glycol 3350 17 Gram(s) Oral daily  potassium chloride    Tablet ER 20 milliEquivalent(s) Oral once  vancomycin  IVPB 1000 milliGRAM(s) IV Intermittent every 12 hours    MEDICATIONS  (PRN):  acetaminophen     Tablet .. 650 milliGRAM(s) Oral every 6 hours PRN Temp greater or equal to 38C (100.4F), Mild Pain (1 - 3)  dextrose Oral Gel 15 Gram(s) Oral once PRN Blood Glucose LESS THAN 70 milliGRAM(s)/deciliter  lactulose Syrup 20 Gram(s) Oral daily PRN for constipation        CAPILLARY BLOOD GLUCOSE      POCT Blood Glucose.: 167 mg/dL (03 Aug 2023 12:24)  POCT Blood Glucose.: 109 mg/dL (03 Aug 2023 08:21)  POCT Blood Glucose.: 135 mg/dL (02 Aug 2023 21:41)  POCT Blood Glucose.: 106 mg/dL (02 Aug 2023 17:00)    I&O's Summary    02 Aug 2023 07:01  -  03 Aug 2023 07:00  --------------------------------------------------------  IN: 360 mL / OUT: 1400 mL / NET: -1040 mL        Vital Signs Last 24 Hrs  T(C): 37.4 (03 Aug 2023 13:33), Max: 37.4 (03 Aug 2023 13:33)  T(F): 99.3 (03 Aug 2023 13:33), Max: 99.3 (03 Aug 2023 13:33)  HR: 81 (03 Aug 2023 13:33) (81 - 103)  BP: 136/92 (03 Aug 2023 13:33) (111/65 - 136/92)  BP(mean): --  RR: 18 (03 Aug 2023 13:33) (18 - 18)  SpO2: 96% (03 Aug 2023 13:33) (96% - 97%)      PHYSICAL EXAM:  EYES: EOMI, PERRLA  NECK: Supple, No JVD  CHEST/LUNG: clear  HEART: S1 S2; no murmurs   ABDOMEN: Soft, Nontender  EXTREMITIES:   no edema  NEUROLOGY: AO x 3   SKIN: sacral decub     LABS:                        11.7   4.06  )-----------( 263      ( 03 Aug 2023 07:20 )             35.7     08-03    147<H>  |  106  |  7   ----------------------------<  104<H>  3.4<L>   |  21<L>  |  0.44<L>    Ca    9.9      03 Aug 2023 07:18  Mg     2.4     08-03            Urinalysis Basic - ( 03 Aug 2023 07:18 )    Color: x / Appearance: x / SG: x / pH: x  Gluc: 104 mg/dL / Ketone: x  / Bili: x / Urobili: x   Blood: x / Protein: x / Nitrite: x   Leuk Esterase: x / RBC: x / WBC x   Sq Epi: x / Non Sq Epi: x / Bacteria: x          All consultant(s) notes reviewed and care discussed with other providers        Contact Number, Dr Siddiqui [phone]
Patient is a 77y old  Female who presents with a chief complaint of tachycardia (02 Aug 2023 08:24)      DATE OF SERVICE: 08-02-23 @ 16:59    SUBJECTIVE / OVERNIGHT EVENTS: overnight events noted    ROS:  Resp: No cough no sputum production  CVS: No chest pain no palpitations no orthopnea  GI: no N/V/D  : no dysuria, no hematuria  "I feel OK'       MEDICATIONS  (STANDING):  amLODIPine   Tablet 5 milliGRAM(s) Oral daily  ascorbic acid 500 milliGRAM(s) Oral daily  atorvastatin 20 milliGRAM(s) Oral at bedtime  chlorhexidine 2% Cloths 1 Application(s) Topical daily  Dakins Solution - 1/4 Strength 1 Application(s) Topical three times a day  dextrose 5%. 1000 milliLiter(s) (50 mL/Hr) IV Continuous <Continuous>  dextrose 5%. 1000 milliLiter(s) (100 mL/Hr) IV Continuous <Continuous>  dextrose 50% Injectable 25 Gram(s) IV Push once  dextrose 50% Injectable 25 Gram(s) IV Push once  dextrose 50% Injectable 12.5 Gram(s) IV Push once  enoxaparin Injectable 40 milliGRAM(s) SubCutaneous every 24 hours  gabapentin 200 milliGRAM(s) Oral three times a day  glucagon  Injectable 1 milliGRAM(s) IntraMuscular once  insulin lispro (ADMELOG) corrective regimen sliding scale   SubCutaneous three times a day before meals  insulin lispro (ADMELOG) corrective regimen sliding scale   SubCutaneous at bedtime  meropenem  IVPB      meropenem  IVPB 1000 milliGRAM(s) IV Intermittent every 8 hours  metoprolol succinate ER 25 milliGRAM(s) Oral daily  polyethylene glycol 3350 17 Gram(s) Oral daily  vancomycin  IVPB 750 milliGRAM(s) IV Intermittent every 12 hours  vancomycin  IVPB        MEDICATIONS  (PRN):  acetaminophen     Tablet .. 650 milliGRAM(s) Oral every 6 hours PRN Temp greater or equal to 38C (100.4F), Mild Pain (1 - 3)  dextrose Oral Gel 15 Gram(s) Oral once PRN Blood Glucose LESS THAN 70 milliGRAM(s)/deciliter  lactulose Syrup 20 Gram(s) Oral daily PRN for constipation        CAPILLARY BLOOD GLUCOSE      POCT Blood Glucose.: 92 mg/dL (02 Aug 2023 12:25)  POCT Blood Glucose.: 134 mg/dL (02 Aug 2023 08:18)  POCT Blood Glucose.: 153 mg/dL (01 Aug 2023 21:41)  POCT Blood Glucose.: 132 mg/dL (01 Aug 2023 18:29)    I&O's Summary    01 Aug 2023 07:01  -  02 Aug 2023 07:00  --------------------------------------------------------  IN: 120 mL / OUT: 1650 mL / NET: -1530 mL    02 Aug 2023 07:01  -  02 Aug 2023 16:59  --------------------------------------------------------  IN: 0 mL / OUT: 650 mL / NET: -650 mL        Vital Signs Last 24 Hrs  T(C): 36.8 (02 Aug 2023 12:18), Max: 37.3 (01 Aug 2023 17:41)  T(F): 98.2 (02 Aug 2023 12:18), Max: 99.1 (01 Aug 2023 17:41)  HR: 102 (02 Aug 2023 12:18) (91 - 102)  BP: 123/81 (02 Aug 2023 12:18) (123/81 - 138/79)  BP(mean): --  RR: 18 (02 Aug 2023 12:18) (18 - 18)  SpO2: 98% (02 Aug 2023 12:18) (98% - 100%)    PHYSICAL EXAM:  EYES: EOMI, PERRLA  NECK: Supple, No JVD  CHEST/LUNG: clear  HEART: S1 S2; no murmurs   ABDOMEN: Soft, Nontender  EXTREMITIES:   no edema  NEUROLOGY: AO x 3   SKIN: sacral decub     LABS:                        12.1   5.12  )-----------( 276      ( 01 Aug 2023 07:35 )             36.9     08-01    139  |  102  |  <4<L>  ----------------------------<  95  3.7   |  24  |  0.33<L>    Ca    9.5      01 Aug 2023 07:35            Urinalysis Basic - ( 01 Aug 2023 07:35 )    Color: x / Appearance: x / SG: x / pH: x  Gluc: 95 mg/dL / Ketone: x  / Bili: x / Urobili: x   Blood: x / Protein: x / Nitrite: x   Leuk Esterase: x / RBC: x / WBC x   Sq Epi: x / Non Sq Epi: x / Bacteria: x          All consultant(s) notes reviewed and care discussed with other providers        Contact Number, Dr Siddiqui [phone]
Patient is a 77y old  Female who presents with a chief complaint of tachycardia (05 Aug 2023 08:18)      DATE OF SERVICE: 08-05-23 @ 16:38    SUBJECTIVE / OVERNIGHT EVENTS: overnight events noted    ROS:  Resp: No cough no sputum production  CVS: No chest pain no palpitations no orthopnea  GI: no N/V/D  "I feel fine, doctor"        MEDICATIONS  (STANDING):  amLODIPine   Tablet 5 milliGRAM(s) Oral daily  ascorbic acid 500 milliGRAM(s) Oral daily  atorvastatin 20 milliGRAM(s) Oral at bedtime  chlorhexidine 2% Cloths 1 Application(s) Topical daily  dextrose 5%. 1000 milliLiter(s) (100 mL/Hr) IV Continuous <Continuous>  dextrose 5%. 1000 milliLiter(s) (50 mL/Hr) IV Continuous <Continuous>  dextrose 50% Injectable 25 Gram(s) IV Push once  dextrose 50% Injectable 12.5 Gram(s) IV Push once  dextrose 50% Injectable 25 Gram(s) IV Push once  enoxaparin Injectable 40 milliGRAM(s) SubCutaneous every 24 hours  gabapentin 200 milliGRAM(s) Oral three times a day  glucagon  Injectable 1 milliGRAM(s) IntraMuscular once  insulin lispro (ADMELOG) corrective regimen sliding scale   SubCutaneous three times a day before meals  insulin lispro (ADMELOG) corrective regimen sliding scale   SubCutaneous at bedtime  meropenem  IVPB      meropenem  IVPB 1000 milliGRAM(s) IV Intermittent every 8 hours  metoprolol succinate ER 25 milliGRAM(s) Oral daily  polyethylene glycol 3350 17 Gram(s) Oral daily  vancomycin  IVPB 1000 milliGRAM(s) IV Intermittent every 12 hours    MEDICATIONS  (PRN):  acetaminophen     Tablet .. 650 milliGRAM(s) Oral every 6 hours PRN Temp greater or equal to 38C (100.4F), Mild Pain (1 - 3)  dextrose Oral Gel 15 Gram(s) Oral once PRN Blood Glucose LESS THAN 70 milliGRAM(s)/deciliter  lactulose Syrup 20 Gram(s) Oral daily PRN for constipation        CAPILLARY BLOOD GLUCOSE      POCT Blood Glucose.: 187 mg/dL (05 Aug 2023 12:06)  POCT Blood Glucose.: 126 mg/dL (05 Aug 2023 08:03)  POCT Blood Glucose.: 310 mg/dL (04 Aug 2023 21:13)    I&O's Summary    04 Aug 2023 07:01  -  05 Aug 2023 07:00  --------------------------------------------------------  IN: 760 mL / OUT: 3000 mL / NET: -2240 mL    05 Aug 2023 07:01  -  05 Aug 2023 16:38  --------------------------------------------------------  IN: 420 mL / OUT: 0 mL / NET: 420 mL        Vital Signs Last 24 Hrs  T(C): 36.8 (05 Aug 2023 11:49), Max: 37.2 (04 Aug 2023 21:17)  T(F): 98.2 (05 Aug 2023 11:49), Max: 98.9 (04 Aug 2023 21:17)  HR: 86 (05 Aug 2023 11:49) (83 - 97)  BP: 122/75 (05 Aug 2023 11:49) (100/65 - 122/75)  BP(mean): --  RR: 18 (05 Aug 2023 11:49) (18 - 18)  SpO2: 97% (05 Aug 2023 11:49) (95% - 97%)    PHYSICAL EXAM:  EYES: EOMI, PERRLA  NECK: Supple, No JVD  CHEST/LUNG: clear  HEART: S1 S2; no murmurs   ABDOMEN: Soft, Nontender  EXTREMITIES:   no edema  NEUROLOGY: AO x 3   SKIN: sacral decub with VAC placed    LABS:                        11.6   4.22  )-----------( 258      ( 04 Aug 2023 07:13 )             35.4     08-04    141  |  104  |  9   ----------------------------<  92  3.9   |  26  |  0.48<L>    Ca    9.5      04 Aug 2023 07:13            Urinalysis Basic - ( 04 Aug 2023 07:13 )    Color: x / Appearance: x / SG: x / pH: x  Gluc: 92 mg/dL / Ketone: x  / Bili: x / Urobili: x   Blood: x / Protein: x / Nitrite: x   Leuk Esterase: x / RBC: x / WBC x   Sq Epi: x / Non Sq Epi: x / Bacteria: x          All consultant(s) notes reviewed and care discussed with other providers        Contact Number, Dr Siddiqui [phone]
Patient is a 77y old  Female who presents with a chief complaint of tachycardia (06 Aug 2023 12:11)      DATE OF SERVICE: 08-06-23 @ 14:37    SUBJECTIVE / OVERNIGHT EVENTS: overnight events noted    ROS:  Resp: No cough no sputum production  CVS: No chest pain no palpitations no orthopnea  GI: no N/V/D  : no dysuria, no hematuria          MEDICATIONS  (STANDING):  amLODIPine   Tablet 5 milliGRAM(s) Oral daily  ascorbic acid 500 milliGRAM(s) Oral daily  atorvastatin 20 milliGRAM(s) Oral at bedtime  chlorhexidine 2% Cloths 1 Application(s) Topical daily  dextrose 5%. 1000 milliLiter(s) (100 mL/Hr) IV Continuous <Continuous>  dextrose 5%. 1000 milliLiter(s) (50 mL/Hr) IV Continuous <Continuous>  dextrose 50% Injectable 25 Gram(s) IV Push once  dextrose 50% Injectable 12.5 Gram(s) IV Push once  dextrose 50% Injectable 25 Gram(s) IV Push once  enoxaparin Injectable 40 milliGRAM(s) SubCutaneous every 24 hours  gabapentin 200 milliGRAM(s) Oral three times a day  glucagon  Injectable 1 milliGRAM(s) IntraMuscular once  insulin lispro (ADMELOG) corrective regimen sliding scale   SubCutaneous three times a day before meals  insulin lispro (ADMELOG) corrective regimen sliding scale   SubCutaneous at bedtime  meropenem  IVPB      meropenem  IVPB 1000 milliGRAM(s) IV Intermittent every 8 hours  metoprolol succinate ER 25 milliGRAM(s) Oral daily  polyethylene glycol 3350 17 Gram(s) Oral daily  vancomycin  IVPB 1000 milliGRAM(s) IV Intermittent every 12 hours    MEDICATIONS  (PRN):  acetaminophen     Tablet .. 650 milliGRAM(s) Oral every 6 hours PRN Temp greater or equal to 38C (100.4F), Mild Pain (1 - 3)  dextrose Oral Gel 15 Gram(s) Oral once PRN Blood Glucose LESS THAN 70 milliGRAM(s)/deciliter  lactulose Syrup 20 Gram(s) Oral daily PRN for constipation        CAPILLARY BLOOD GLUCOSE      POCT Blood Glucose.: 124 mg/dL (06 Aug 2023 12:20)  POCT Blood Glucose.: 147 mg/dL (06 Aug 2023 08:07)  POCT Blood Glucose.: 140 mg/dL (05 Aug 2023 22:09)  POCT Blood Glucose.: 137 mg/dL (05 Aug 2023 16:42)    I&O's Summary    05 Aug 2023 07:01  -  06 Aug 2023 07:00  --------------------------------------------------------  IN: 420 mL / OUT: 1600 mL / NET: -1180 mL        Vital Signs Last 24 Hrs  T(C): 36.7 (06 Aug 2023 12:07), Max: 36.8 (05 Aug 2023 21:43)  T(F): 98 (06 Aug 2023 12:07), Max: 98.3 (05 Aug 2023 21:43)  HR: 78 (06 Aug 2023 12:07) (78 - 83)  BP: 117/72 (06 Aug 2023 12:07) (104/60 - 117/73)  BP(mean): --  RR: 18 (06 Aug 2023 12:07) (18 - 18)  SpO2: 96% (06 Aug 2023 12:07) (96% - 97%)    PHYSICAL EXAM:  EYES: EOMI, PERRLA  NECK: Supple, No JVD  CHEST/LUNG: clear  HEART: S1 S2; no murmurs   ABDOMEN: Soft, Nontender  EXTREMITIES:   no edema  NEUROLOGY: AO x 3   SKIN: sacral decub VAC placed    LABS:                      All consultant(s) notes reviewed and care discussed with other providers        Contact Number, Dr Siddiqui [phone]

## 2023-08-06 NOTE — PROGRESS NOTE ADULT - PROBLEM SELECTOR PLAN 7
Lovenox 40mg subcutaneous daily

## 2023-08-30 NOTE — ED ADULT TRIAGE NOTE - ARRIVAL FROM
[address] [place] [address]  Progress Note: Critical Care  Name: Casi Canchola 79 y.o. female I MRN: [id number]  Unit/Bed#: [facility] 254-36 I Date of Admission: 8/28/2023   Date of Service: 8/30/2023 I Hospital Day: 2    Assessment/Plan      Neuro:   • Diagnosis: I&D R hip septic joint with wound closure   ? Plan: Dilaudid PRN, tylenol 975 Q8H     Diagnosis: AMS  - decreased responsiveness worse in AM- still AAOx3  - regulate sleep cycle  -orientation as needed  - from bed to chair with PT/OT       CV:   • Diagnosis: HFpEF  ? Resume home dose or 40mg torsemide. ? Continue D5 LR for hypoglycemia; monitor hydration / fluid status  ? I/O monitoring         • Diagnosis: A-fib  ? Pacemaker in place  ? Continue heparin. . Plan to resume home dose of coumadin prior to discharge with goal INR 2-3.   ? heparin for DVT ppx given CKD        CV:   Dx: Hypotension:  -Continue IV fluids  -Transfuse as needed and monitor response  - Goal systolic BP > 185                  Pulm:  • Diagnosis: Increased O2 requirement  • No requirement at home, has gone from 2L to 5L via NC  • CXR ordered today to investigate potential cause of increased O2 demand. May be related to fluid overload; restarted home torsemide and given 40mg IV lasix  • Continue to monitor strict I/O with net negative goal of 0.5-1L  ? Post op incentive spirometry if needed         GI:   • Diagnosis: GERD  ? Plan: Continue home PPI; 40mg protonix  ? Bowel regimen         :   • Diagnosis: No active dx. ? Plan: Burger in place and will consider removal as soon as practical given fresh surgical wound and inability to ambulate at baseline   ? Monitor I/O post-op        F/E/N:   • F: 5% dex + LR @ 100 ml/hr   • E: monitor and replete as needed   • N: not tolerating regular diet for the past 1.5 days.                - consider initiating tube feeds      Heme/Onc:   • Diagnosis: anemia ; bled through first dressing in first 12 hours post op ? Plan: monitor H/H  ? Consider transfusion for symptomatic Hb < 7  ? Hb dropped to 7 today ; plan to transfuse 1 unit PRBC and recheck [facility]  ? HH checks BID  ? Fe supplementation   ? INR still elevated; consider further eval with LFTs     DVT ppx with heparin with plan to resume coumadin prior to DC        Endo:   • Diagnosis: Hypothyroidism   Plan: continue home dose of 112mcg levothyroxine    • Diagnosis: hypoglycemia  • Unclear etiology- typically lowest in early AM  • AM cortisol was unremarkable          -given cyclical nature of hypoglycemia stress dose steroids will be started today and monitor respose  • Responsive to dextrose amps  • Continue 100ml/hr D5 LR infusion  • Frequent finger stick glucose measurement with subsequent treatment with dextrose as clinically indicated   • Target glucose > 70          ID:   • Diagnosis: Septic joint of R hip s/p prosthesis removal  ? ID consulted; base don cultures, cefepime 2g Q12 single coverage started for pseudomonas coverage   ? Outpt abx selection TBD        MSK/Skin:  • Diagnosis: open  I&D septic R hip  ? Plan: Keep incision clean and dry; monitor for signs of infection  ? Continue IV abx  ? Dressing changes PRN  ? Pain control with dilaudid and PO tylenol for OA  ? Remain non-weight bearing/ non-ambulatory    • Diagnosis: LUE pain  ? LUE appears erythematous, edematous, and hot to the touch, concerning for possible clot vs thrombophlebitis. Weak pulses  ? Doppler LUE today        Disposition: Critical care    ICU Core Measures     A: Assess, Prevent, and Manage Pain · Has pain been assessed? Yes  · Need for changes to pain regimen? No   B: Both SAT/SAT  · N/A   C: Choice of Sedation · RASS Goal: -1 Drowsy or 0 Alert and Calm  · Need for changes to sedation or analgesia regimen? No   D: Delirium · CAM-ICU: Negative   E: Early Mobility  · Plan for early mobility? No   F: Family Engagement · Plan for family engagement today?  Yes       Antibiotic Review: Infectious disease consulted    Review of Invasive Devices: Burger Plan: Continue for accurate I/O monitoring for 48 hours        Prophylaxis:  VTE VTE covered by:  heparin (porcine), Subcutaneous, 5,000 Units at 08/30/23 0522       Stress Ulcer  covered bypantoprazole (PROTONIX) EC tablet 40 mg [[id number]]          Subjective   HPI/24hr events: Patient became hypoglycemic down to a low of 27 again. Fluctuate between 27 and 154; lowest in the AM. hypoglycemic episodes respond well to 1 amp dextrose. Home dose torsemide restarted; 600mL urine output overnight. . Tolerating D5 infusion which was switched to D5LR this AM.   This morning, the patient was found to have a hemoglobin of 7.   1 unit of red blood cells was ordered. Moderate amount of blood on surgical wound dressing. PICC placed last evening. Pt reports pain in her arms , L>R. Decreased responsiveness but AAOx3 upon awakening. Has not eaten a meal since arrival to the floor about 2 days ago. Objective                            Vitals I/O      Most Recent Min/Max in 24hrs   Temp 98.4 °F (36.9 °C) Temp  Min: 96.6 °F (35.9 °C)  Max: 98.4 °F (36.9 °C)   Pulse 69 Pulse  Min: 69  Max: 91   Resp 15 Resp  Min: 12  Max: 44   /52 BP  Min: 79/39  Max: 155/69   O2 Sat 100 % SpO2  Min: 86 %  Max: 100 %      Intake/Output Summary (Last 24 hours) at 8/30/2023 1253  Last data filed at 8/30/2023 1000  Gross per 24 hour   Intake 3560.84 ml   Output 1110 ml   Net 2450.84 ml         Diet Enteral/Parenteral; Tube Feeding No Oral Diet; Jevity 1.2 Live; Continuous; 50     Invasive Monitoring Physical exam   Burger cath Physical Exam  Eyes:      General: Neglect  Skin:     General: Skin is warm and dry. Findings: Wound present. Comments: Surgical incision posterior right hip covered with fresh dressing with small area of bleeding visible through dressing. Mild tenderness to palpation around the wound. HENT:      Head: Normocephalic and atraumatic. Mouth/Throat:      Mouth: Mucous membranes are dry. Neck:     Vascular: No JVD. Cardiovascular:      Rate and Rhythm: Normal rate and regular rhythm. Pulses: Pulses are Pulses not palpable through +3 pitting edema bilat. +1 pulse in bilat radial arteries. Pulses in all 4 extremities confirmed with doppler this AM. . Decreased pulses. Heart sounds: Normal heart sounds. Musculoskeletal:      Right lower leg: 3+ Edema present. Left lower leg: 3+ Edema present. Comments: LUE from elbow to wrist appears edematous , erythematous, and warm to the touch. L hand is ecchymotic and dusky. Sensation intact ,  strength 5/5. PICC in place R arm. Abdominal:      Palpations: Abdomen is soft. Tenderness: There is no abdominal tenderness. There is no guarding. Constitutional:       General: She is not in acute distress. Appearance: She is well-developed. She is ill-appearing. She is not toxic-appearing. Interventions: She is not sedated, not intubated and not restrained. Pulmonary:      Effort: Pulmonary effort is normal. No respiratory distress. She is not intubated. Breath sounds: Normal breath sounds. Psychiatric:         Speech: Speech is not no receptive aphasia or no expressive aphasia. Neurological:      General: No focal deficit present. Mental Status: She is somnolent. She is CAM ICU negative. Cranial Nerves: No dysarthria or facial asymmetry. Sensory: No sensory deficit. Motor: No motor deficit. Genitourinary/Anorectal:     Comments: 200mL in cath bag since this AM  FoleyVitals reviewed.           Diagnostic Studies      EKG: reviewed   Imaging: N/A     Medications:  Scheduled PRN   acetaminophen, 975 mg, Q8H [address]  cefepime, 2,000 mg, Q12H  chlorhexidine, 15 mL, Q12H JOSE  DULoxetine, 60 mg, Daily  ferrous sulfate, 325 mg, Daily With Breakfast  heparin (porcine), 5,000 Units, Q8H JOSE  hydrocortisone sodium succinate, 100 mg, Q12H [address]  levothyroxine, 112 mcg, Early Morning  pantoprazole, 40 mg, Early Morning  potassium chloride, 40 mEq, Once   Followed by  potassium chloride, 40 mEq, Once  senna-docusate sodium, , Daily  [START ON 8/31/2023] torsemide, 40 mg, Daily      simethicone, 80 mg, Q6H PRN       Continuous    dextrose 5% lactated ringer's, 100 mL/hr, Last Rate: 100 mL/hr (08/30/23 0629)         Labs:    CBC    Recent Labs     08/29/23  0443 08/29/23  1423 08/30/23  0425 08/30/23  1122   WBC 9.24  --  9.72  --    HGB 6.7*   < > 7.0* 8.6*   HCT 21.1*  --  21.3*  --      --  137*  --     < > = values in this interval not displayed.      BMP    Recent Labs     08/29/23  1806 08/30/23  0000   SODIUM 137 137   K 3.6 3.2*   * 110*   CO2 20* 20*   AGAP 6 7   BUN 26* 26*   CREATININE 1.10 1.07   CALCIUM 7.0* 7.5*       Coags    Recent Labs     08/29/23  0443 08/30/23  1122   INR 2.35* 2.44*   PTT  --  52*        Additional Electrolytes  Recent Labs     08/28/23  1659 08/29/23  0443 08/30/23  0000 08/30/23  0416   MG 1.4* 1.9 2.1  --    PHOS 3.2 3.0 2.4  --    CAIONIZED 1.09*  --   --  1.17          Blood Gas    No recent results  No recent results LFTs  Recent Labs     08/29/23  0050 08/29/23  0443   ALT 7 8   AST 12* 11*   ALKPHOS 69 82   ALB 1.6* 1.8*   TBILI 0.25 0.28       Infectious  No recent results  Glucose  Recent Labs     08/29/23  0050 08/29/23  0443 08/29/23  1806 08/30/23  0000   GLUC 78 [phone]* [address]             Car Rutherford MD Home

## 2023-09-01 RX ORDER — AMLODIPINE BESYLATE 2.5 MG/1
1 TABLET ORAL
Refills: 0 | DISCHARGE

## 2023-09-01 RX ORDER — GABAPENTIN 400 MG/1
2 CAPSULE ORAL
Refills: 0 | DISCHARGE

## 2023-09-01 RX ORDER — LACTULOSE 10 G/15ML
30 SOLUTION ORAL
Refills: 0 | DISCHARGE

## 2023-09-01 RX ORDER — ASCORBIC ACID 60 MG
1 TABLET,CHEWABLE ORAL
Refills: 0 | DISCHARGE

## 2023-09-01 RX ORDER — VANCOMYCIN HCL 1 G
750 VIAL (EA) INTRAVENOUS
Refills: 0 | DISCHARGE

## 2023-09-01 RX ORDER — POLYETHYLENE GLYCOL 3350 17 G/17G
17 POWDER, FOR SOLUTION ORAL
Refills: 0 | DISCHARGE

## 2023-09-01 RX ORDER — MEROPENEM 1 G/30ML
1000 INJECTION INTRAVENOUS
Qty: 0 | Refills: 0 | DISCHARGE

## 2023-09-01 RX ORDER — METFORMIN HYDROCHLORIDE 850 MG/1
1 TABLET ORAL
Refills: 0 | DISCHARGE

## 2023-09-01 RX ORDER — ATORVASTATIN CALCIUM 80 MG/1
1 TABLET, FILM COATED ORAL
Refills: 0 | DISCHARGE

## 2023-10-28 NOTE — PATIENT PROFILE ADULT - NSPROHMDIABETMGMTSTRAT_GEN_A_NUR
Notified correction charge about patient's BP of 73/48 after 500 mL of LR. Patient denies headache and dizziness and states that she is feeling much better. Continued the rest of LR at 100 mL/hr and will reassess vitals after LR is finished. Reminded patient to not get out of bed without letting the nurse know for extra assistance. Patient understanding and complying.    Brenda Nguyen RN on 10/28/2023 at 3:30 AM     blood glucose testing

## 2024-02-01 NOTE — CONSULT NOTE ADULT - NS ATTEND OPT1 GEN_ALL_CORE
I attest my time as attending is greater than 50% of the total combined time spent on qualifying patient care activities by the PA/NP and attending. no

## 2024-03-11 NOTE — ED ADULT NURSE NOTE - MUSCULOSKELETAL ASSESSMENT
distance: >3 FB   Neck ROM: full  Mouth opening: > = 3 FB   Dental: normal exam         Pulmonary:Negative Pulmonary ROS breath sounds clear to auscultation                             Cardiovascular:  Exercise tolerance: good (>4 METS)          Rhythm: regular  Rate: normal                    Neuro/Psych:   Negative Neuro/Psych ROS              GI/Hepatic/Renal:   (+) PUD         ROS comment: Acute blood loss anemia  Received 2 units PRBCs for hgb 5 on arrival, now 7.7    Only has 22G antecubital IV now    Some nausea intermittantly  Constipation; severe.   Endo/Other:                     Abdominal:             Vascular: negative vascular ROS.         Other Findings:         Anesthesia Plan      TIVA     ASA 2     (Will need larger IV)  Induction: intravenous.      Anesthetic plan and risks discussed with patient.    Use of blood products discussed with patient whom consented to blood products.    Plan discussed with CRNA.                TATY COVARRUBIAS MD   3/11/2024            
WDL

## 2024-05-01 NOTE — ED ADULT NURSE NOTE - EXTENSIONS OF SELF_ADULT
None #Severe sepsis, POA, w/ lactic acidosis, 2/2 UTI  #Gram negative UTI  #Chronic A fib  #DM  #Hypercalcemia      -Reviewed previous labs. Invanz for now. Monitoring vitals. Follow up cultures. Stones noted on imaging, non appear obstructing. if not improving consider stone as source control.   -c/w AC. IVC filter noted on imaging.   -Giving IVF. Unclear etiology. Sending basic workup. PTH. Ca. Vit D. .        MDM High  Reviewed ER course, blood work, vitals  Independently reviewed CT abd  Case d/w house staff, ER provider, SW/CM.   Management of severe sepsis.

## 2024-08-25 NOTE — ED ADULT NURSE NOTE - CAS EDN DISCHARGE INTERVENTIONS
Admission Reconciliation is Completed  Discharge Reconciliation is Not Complete Admission Reconciliation is Completed  Discharge Reconciliation is Completed IV discontinued, cath removed intact

## 2024-08-28 NOTE — ED ADULT NURSE NOTE - NSFALLRSKINDICTYPE_ED_ALL_ED
Quality 226: Preventive Care And Screening: Tobacco Use: Screening And Cessation Intervention: Patient screened for tobacco use and is an ex/non-smoker
Quality 130: Documentation Of Current Medications In The Medical Record: Current Medications Documented
Quality 431: Preventive Care And Screening: Unhealthy Alcohol Use - Screening: Patient not identified as an unhealthy alcohol user when screened for unhealthy alcohol use using a systematic screening method
Detail Level: Detailed
Need for Mobility Assisted Device

## 2024-12-19 NOTE — ED PROVIDER NOTE - NS ED MD DISPO DIVISION
ANTICOAGULATION MANAGEMENT:  Medication Refill    Anticoagulation Summary  As of 11/27/2024      Warfarin maintenance plan:  10 mg (5 mg x 2) every Mon; 7.5 mg (5 mg x 1.5) all other days   Next INR check:  1/8/2025   Target end date:  Indefinite    Indications    Paroxysmal atrial fibrillation (H) [I48.0]                 Anticoagulation Care Providers       Provider Role Specialty Phone number    EvelioDoris APRN CNP Referring Cardiovascular Disease [phone]    Nicole Gray NP Referring Nurse Practitioner - Family [phone]    Yadira Douglas MD Referring Urgent Care [phone]    Flor Ernst PA-C Referring Family Medicine [phone]            Refill Criteria    Visit with referring provider/group: Meets criteria: visit within referring provider group in the last 15 months on 12/11/24    [facility] referral last signed: 12/02/2024; within last year:  Yes    Lab monitoring not exceeding 2 weeks overdue: No    Tatiana meets all criteria for refill. Rx instructions and quantity match patient's current dosing plan. Warfarin 90 day supply with 1 refill granted per [facility] protocol     Jazmin HERNANDEZ RN  Anticoagulation Clinic       [facility]

## 2025-01-16 NOTE — PATIENT PROFILE ADULT - FOOD INSECURITY
Twila at [facility] states that the prescription for Arnuity that was sent today but it will be around $150 because it is going towards patient's deductible.   LM for patient that the prescription is at [facility].    no